# Patient Record
Sex: MALE | URBAN - METROPOLITAN AREA
[De-identification: names, ages, dates, MRNs, and addresses within clinical notes are randomized per-mention and may not be internally consistent; named-entity substitution may affect disease eponyms.]

---

## 2017-06-07 ENCOUNTER — MEDICAL CORRESPONDENCE (OUTPATIENT)
Dept: TRANSPLANT | Facility: CLINIC | Age: 52
End: 2017-06-07

## 2017-11-27 ENCOUNTER — OFFICE VISIT (OUTPATIENT)
Dept: TRANSPLANT | Facility: CLINIC | Age: 52
End: 2017-11-27
Attending: INTERNAL MEDICINE
Payer: COMMERCIAL

## 2017-11-27 ENCOUNTER — MEDICAL CORRESPONDENCE (OUTPATIENT)
Dept: TRANSPLANT | Facility: CLINIC | Age: 52
End: 2017-11-27

## 2017-11-27 VITALS
TEMPERATURE: 98 F | DIASTOLIC BLOOD PRESSURE: 79 MMHG | HEART RATE: 108 BPM | WEIGHT: 184.7 LBS | BODY MASS INDEX: 28.32 KG/M2 | RESPIRATION RATE: 16 BRPM | OXYGEN SATURATION: 95 % | SYSTOLIC BLOOD PRESSURE: 120 MMHG

## 2017-11-27 DIAGNOSIS — C90.00 MULTIPLE MYELOMA NOT HAVING ACHIEVED REMISSION (H): Primary | ICD-10-CM

## 2017-11-27 DIAGNOSIS — C90.02 MULTIPLE MYELOMA IN RELAPSE (H): Primary | ICD-10-CM

## 2017-11-27 DIAGNOSIS — Z71.9 VISIT FOR COUNSELING: Primary | ICD-10-CM

## 2017-11-27 PROCEDURE — 40000268 ZZH STATISTIC NO CHARGES: Mod: ZF

## 2017-11-27 PROCEDURE — 99213 OFFICE O/P EST LOW 20 MIN: CPT | Mod: ZF

## 2017-11-27 RX ORDER — ACETAMINOPHEN 500 MG
1000 TABLET ORAL
Status: ON HOLD | COMMUNITY
End: 2018-01-16

## 2017-11-27 RX ORDER — ONDANSETRON 4 MG/1
4 TABLET, ORALLY DISINTEGRATING ORAL
COMMUNITY
Start: 2016-01-25

## 2017-11-27 RX ORDER — DOXYCYCLINE 100 MG/1
100 CAPSULE ORAL
COMMUNITY
Start: 2017-12-05 | End: 2017-12-15

## 2017-11-27 RX ORDER — LOPERAMIDE HYDROCHLORIDE 2 MG/1
2 TABLET ORAL
Status: ON HOLD | COMMUNITY
End: 2018-01-16

## 2017-11-27 RX ORDER — HYDROMORPHONE HYDROCHLORIDE 2 MG/1
2 TABLET ORAL
Status: ON HOLD | COMMUNITY
Start: 2017-10-09 | End: 2018-01-16

## 2017-11-27 RX ORDER — PANTOPRAZOLE SODIUM 40 MG/1
40 TABLET, DELAYED RELEASE ORAL
Status: ON HOLD | COMMUNITY
Start: 2017-11-20 | End: 2018-01-16

## 2017-11-27 RX ORDER — DEXAMETHASONE 4 MG/1
TABLET ORAL
Status: ON HOLD | COMMUNITY
Start: 2017-09-27 | End: 2018-01-16

## 2017-11-27 RX ORDER — OXYCODONE HCL 10 MG/1
10 TABLET, FILM COATED, EXTENDED RELEASE ORAL EVERY MORNING
Status: ON HOLD | COMMUNITY
Start: 2017-10-30 | End: 2018-01-16

## 2017-11-27 RX ORDER — DILTIAZEM HYDROCHLORIDE 30 MG/1
60 TABLET, FILM COATED ORAL
COMMUNITY

## 2017-11-27 ASSESSMENT — PAIN SCALES - GENERAL: PAINLEVEL: NO PAIN (0)

## 2017-11-27 NOTE — NURSING NOTE
"Oncology Rooming Note    November 27, 2017 9:50 AM   Jeffrey Real is a 52 year old male who presents for:    Chief Complaint   Patient presents with     Consult     Repeat Consult- MM     Initial Vitals: /79  Pulse 108  Temp 98  F (36.7  C) (Oral)  Resp 16  Wt 83.8 kg (184 lb 11.2 oz)  SpO2 95%  BMI 28.32 kg/m2 Estimated body mass index is 28.32 kg/(m^2) as calculated from the following:    Height as of 12/3/15: 1.72 m (5' 7.72\").    Weight as of this encounter: 83.8 kg (184 lb 11.2 oz). Body surface area is 2 meters squared.  No Pain (0) Comment: Data Unavailable   No LMP for male patient.  Allergies reviewed: Yes  Medications reviewed: Yes    Medications: Medication refills not needed today.  Pharmacy name entered into EPIC: Data Unavailable    Clinical concerns: n/a     9 minutes for nursing intake (face to face time)     WILFREDO ARANA CMA              "

## 2017-11-27 NOTE — MR AVS SNAPSHOT
After Visit Summary   11/27/2017    Jeffrey Real    MRN: [id number]           Patient Information     Date Of Birth          1965        Visit Information        Provider Department      11/27/2017 11:30 AM Coordinator,  Bmt Nurse;  2 116 CONSULT [facility] Blood and Marrow Transplant        Today's Diagnoses     Multiple myeloma not having achieved remission (H)    -  1          [facility] ([facility]  [address] [place] [address]  Phone: [phone]  Clinic Hours:   Monday-Thursday:7am to 7pm   Friday: 7am to 5pm   Weekends and holidays:    8am to noon (in general)  If your fever is 100.5  or greater,   call the clinic.  After hours call the   hospital at [phone] or   [phone]. Ask for the BMT   fellow on-call            Follow-ups after your visit        Your next 10 appointments already scheduled     Nov 27, 2017 12:00 PM CST   (Arrive by 11:45 AM)   BMT [facility]  2 116 CONSULT [facility] Blood and Marrow Transplant ([facility] [place])    [address] [place] [id number] [phone]              Who to contact     If you have questions or need follow up information about today's clinic visit or your schedule please contact [facility] BLOOD AND MARROW TRANSPLANT directly at [phone].  Normal or non-critical lab and imaging results will be communicated to you by MyChart, letter or phone within 4 business days after the clinic has received the results. If you do not hear from us within 7 days, please contact the clinic through MyChart or phone. If you have a critical or abnormal lab result, we will notify you by phone as soon as possible.  Submit refill requests through GonnaBe or call your pharmacy and they will forward the refill request to us. Please allow 3 business days for your refill to be completed.          Additional Information About Your Visit         [employer] Information     [employer] gives you secure access to your electronic health record. If you see a primary care provider, you can also send messages to your care team and make appointments. If you have questions, please call your primary care clinic.  If you do not have a primary care provider, please call [phone] and they will assist you.        Care EveryWhere ID     This is your Care EveryWhere ID. This could be used by other organizations to access your [place] medical records  [id number]         Blood Pressure from Last 3 Encounters:   11/27/17 120/79   12/03/15 118/80    Weight from Last 3 Encounters:   11/27/17 83.8 kg (184 lb 11.2 oz)   12/03/15 97.3 kg (214 lb 8.1 oz)              Today, you had the following     No orders found for display       Recent Review Flowsheet Data     There is no flowsheet data to display.               Primary Care Provider Office Phone # Fax #    Hipolito Rollins  [phone]       [address] [address]        Equal Access to Services     NALINI MCKEON : Hadii aad ku hadasho Soomaali, waaxda luqadaha, qaybta kaalmada adeegyada, waxay bisi haykuldeep crawley . So [facility] [phone].    ATENCIÓN: Si habla español, tiene a guardado disposición servicios gratuitos de asistencia lingüística. Llame al [phone].    We comply with applicable federal civil rights laws and [place] laws. We do not discriminate on the basis of race, color, national origin, age, disability, sex, sexual orientation, or gender identity.            Thank you!     Thank you for choosing [facility] BLOOD AND MARROW TRANSPLANT  for your care. Our goal is always to provide you with excellent care. Hearing back from our patients is one way we can continue to improve our services. Please take a few minutes to complete the written survey that you may receive in the mail after your visit with us. Thank you!             Your Updated Medication List -  Protect others around you: Learn how to safely use, store and throw away your medicines at www.disposemymeds.org.          This list is accurate as of: 11/27/17 11:36 AM.  Always use your most recent med list.                   Brand Name Dispense Instructions for use Diagnosis    acetaminophen 500 MG tablet    TYLENOL     Take 1,000 mg by mouth        ATIVAN PO      Take by mouth At Bedtime        Calcium-Vitamin D3 600-400 MG-UNIT Caps           CITALOPRAM HYDROBROMIDE PO      Take by mouth daily        dexamethasone 4 MG tablet    DECADRON     Every Tuesday        diltiazem 30 MG tablet    CARDIZEM     Take 60 mg by mouth        doxycycline monohydrate 100 MG capsule   Start taking on:  12/5/2017      Take 100 mg by mouth        FAMCICLOVIR PO      Take by mouth daily        GABAPENTIN PO      Take by mouth 3 times daily        HYDROmorphone 2 MG tablet    DILAUDID     Take 2 mg by mouth        loperamide 2 MG tablet    IMODIUM A-D     Take 2 mg by mouth        METOPROLOL TARTRATE PO      Take by mouth daily        OMEPRAZOLE PO      Take by mouth every morning        ondansetron 4 MG ODT tab    ZOFRAN-ODT     Place 4 mg under the tongue        * OXYCODONE HCL PO           * oxyCODONE 10 MG 12 hr tablet    OxyCONTIN     Take 10 mg by mouth        pantoprazole 40 MG EC tablet    PROTONIX     Take 40 mg by mouth        pomalidomide 3 MG capsule    POMALYST     Take 3mg daily, days 1-21 every 28 days.        Quad Cane Misc      Wide Base Quad Cane for home use. For 6 weeks.        REVLIMID PO      Take by mouth daily Takes 21 days, then 7 days off.        TRAZODONE HCL PO      Take by mouth nightly as needed for sleep        * Notice:  This list has 2 medication(s) that are the same as other medications prescribed for you. Read the directions carefully, and ask your doctor or other care provider to review them with you.

## 2017-11-27 NOTE — PROGRESS NOTES
"BMT Clinical Social Work New Transplant Evaluation    Information for this evaluation on 2017 was collected via Pt and family report, consultation with medical team, and medical chart review.     Present:  Patient: Jeffrey \"David\"Farhan  Spouse: Shasta Real  Daughter: Saul Real  Clinical  (CSW): CLAY Jolly, [facility]    Medical Team:   Nurse Coordinator: Suly Calvo RN  BMT Physician: Murali Torre MD  Referring Physician: Stefania Coreas MD      Presenting Information:   Pt is a 52 year old male diagnosed with Multiple Myeloma who presents to [facility] for consultation regarding an allogeneic stem cell transplant. Pt was accompanied by his wife and daughter.      Contact Information:  Pt Phone: [phone]  Pt Email: [email or web address]  Pt's wife Manuel Phone: [phone]    Special Needs:  Pt will need local lodging.   The pt lives in [place], [place] and will need to relocate. Discussed options including the [facility] [place] and [employer] Apartments. The pt at this time would prefer to stay at the [employer] [address] [place].     Family Constellation:   Spouse: Shasta Real  Children: Saul Real, Rocío Real, and Karthikeyan Rice   Parents:   Siblings: 1 brother and 1 sister    Education/Employment:  The pt is unable to work at this time and is on Social Security Disability. Shasta works for [employer].    Finances/Insurance:  The pt is supported by his Social Security Disability. No financial or insurance concerns identified at this time.     CSW provided information regarding the insurance authorization process and the role of the BMT financial case-mangers. CSW provided contact info for the BMT financial case-managers and referred pt to them for future insurance questions.     Caregiver:  CSW discussed with Pt the caregiver role and expectation at length. Pt is agreeable to having a full time caregiver for a minimum of 100 days until cleared by the BMT " physician. Pt's identified caregivers are his wife and children. Caregiver education and information provided. No caregiver concerns identified.     Healthcare Directive:  No. CSW provided education and forms. CSW encouraged Pt to have discussions with their family regarding their health care wishes.     Resources Provided:  BMT Information Book   BMT Resources Packet:   Healthcare Directive:   Tour of Unit NO   Transplant unit description and information provided.     Identified Concerns:   No concerns identified at this time.     Summary:   Pt presents to [facility] for consultation regarding an allogeneic stem cell transplant. Pt/family asked good questions regarding psychosocial factors related to BMT; all of which were answered. Pt presented as friendly, but overwhelmed. Pt's affect was engaged. The pt's family was supportive and engaged in the conversation.       Plan:   CSW provided contact information and encouraged Pt to contact CSW with questions, concerns, resources and for support. CSW will continue to follow Pt to provide supportive counseling and assistance with resources as needed.      CLAY Jolly, [facility]  Pager [phone]  Phone [phone]

## 2017-11-27 NOTE — MR AVS SNAPSHOT
After Visit Summary   11/27/2017    Jeffrey Real    MRN: [id number]           Patient Information     Date Of Birth          1965        Visit Information        Provider Department      11/27/2017 12:00 PM Dulce Rivera [facility];  2 116 CONSULT [facility] Blood and Marrow Transplant        Today's Diagnoses     Visit for counseling    -  1          [facility] [place]  [address] [place] [address]  Phone: [phone]  Clinic Hours:   Monday-Thursday:7am to 7pm   Friday: 7am to 5pm   Weekends and holidays:    8am to noon (in general)  If your fever is 100.5  or greater,   call the clinic.  After hours call the   hospital at [phone] or   [phone]. Ask for the BMT   fellow on-call            Follow-ups after your visit        Who to contact     If you have questions or need follow up information about today's clinic visit or your schedule please contact [facility] BLOOD AND MARROW TRANSPLANT directly at [phone].  Normal or non-critical lab and imaging results will be communicated to you by Programmrhart, letter or phone within 4 business days after the clinic has received the results. If you do not hear from us within 7 days, please contact the clinic through M_SOLUTION or phone. If you have a critical or abnormal lab result, we will notify you by phone as soon as possible.  Submit refill requests through M_SOLUTION or call your pharmacy and they will forward the refill request to us. Please allow 3 business days for your refill to be completed.          Additional Information About Your Visit        MyChart Information     M[employer]UTION gives you secure access to your electronic health record. If you see a primary care provider, you can also send messages to your care team and make appointments. If you have questions, please call your primary care clinic.  If you do not have a primary care provider, please call [phone] and they will assist you.         Care EveryWhere ID     This is your Care EveryWhere ID. This could be used by other organizations to access your [facility] medical records  [id number]         Blood Pressure from Last 3 Encounters:   11/27/17 120/79   12/03/15 118/80    Weight from Last 3 Encounters:   11/27/17 83.8 kg (184 lb 11.2 oz)   12/03/15 97.3 kg (214 lb 8.1 oz)              Today, you had the following     No orders found for display       Recent Review Flowsheet Data     There is no flowsheet data to display.               Primary Care Provider Office Phone # Fax #    Hipolito Rollins [phone]       [address] [address]        Equal Access to Services     NALINI MCKEON : Hadii matthew santiagoo Socordeliaali, waaxda luqadaha, qaybta kaalmada adeegyada, adiel crawley . So [facility] [phone].    ATENCIÓN: Si habla español, tiene a guardado disposición servicios gratuitos de asistencia lingüística. [facility] [phone].    We comply with applicable federal civil rights laws and [place] laws. We do not discriminate on the basis of race, color, national origin, age, disability, sex, sexual orientation, or gender identity.            Thank you!     Thank you for choosing [facility] BLOOD AND MARROW TRANSPLANT  for your care. Our goal is always to provide you with excellent care. Hearing back from our patients is one way we can continue to improve our services. Please take a few minutes to complete the written survey that you may receive in the mail after your visit with us. Thank you!             Your Updated Medication List - Protect others around you: Learn how to safely use, store and throw away your medicines at www.disposemymeds.org.          This list is accurate as of: 11/27/17  1:53 PM.  Always use your most recent med list.                   Brand Name Dispense Instructions for use Diagnosis    acetaminophen 500 MG tablet    TYLENOL     Take 1,000 mg by mouth        ATIVAN  PO      Take by mouth At Bedtime        Calcium-Vitamin D3 600-400 MG-UNIT Caps           CITALOPRAM HYDROBROMIDE PO      Take by mouth daily        dexamethasone 4 MG tablet    DECADRON     Every Tuesday        diltiazem 30 MG tablet    CARDIZEM     Take 60 mg by mouth        doxycycline monohydrate 100 MG capsule   Start taking on:  12/5/2017      Take 100 mg by mouth        FAMCICLOVIR PO      Take by mouth daily        GABAPENTIN PO      Take by mouth 3 times daily        HYDROmorphone 2 MG tablet    DILAUDID     Take 2 mg by mouth        loperamide 2 MG tablet    IMODIUM A-D     Take 2 mg by mouth        METOPROLOL TARTRATE PO      Take by mouth daily        OMEPRAZOLE PO      Take by mouth every morning        ondansetron 4 MG ODT tab    ZOFRAN-ODT     Place 4 mg under the tongue        * OXYCODONE HCL PO           * oxyCODONE 10 MG 12 hr tablet    OxyCONTIN     Take 10 mg by mouth        pantoprazole 40 MG EC tablet    PROTONIX     Take 40 mg by mouth        pomalidomide 3 MG capsule    POMALYST     Take 3mg daily, days 1-21 every 28 days.        Quad Cane Misc      Wide Base Quad Cane for home use. For 6 weeks.        REVLIMID PO      Take by mouth daily Takes 21 days, then 7 days off.        TRAZODONE HCL PO      Take by mouth nightly as needed for sleep        * Notice:  This list has 2 medication(s) that are the same as other medications prescribed for you. Read the directions carefully, and ask your doctor or other care provider to review them with you.
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
## 2017-11-27 NOTE — PROGRESS NOTES
Met with Jeffrey, his wife, and daughter, following Dr. Torre's new evaluation visit in BMT clinic.  Discussed the role of the nurse coordinator and gave her Man [employer]well's business card. All questions were answered and patient verbalized understanding.

## 2017-11-27 NOTE — MR AVS SNAPSHOT
After Visit Summary   11/27/2017    Jeffrey Real    MRN: [id number]           Patient Information     Date Of Birth          1965        Visit Information        Provider Department      11/27/2017 10:00 AM  BMT DOM [facility] Marrow Transplant        Today's Diagnoses     Multiple myeloma in relapse (H)    -  1          [facility]  [address] [place] [address]  Phone: [phone]  Clinic Hours:   Monday-Thursday:7am to 7pm   Friday: 7am to 5pm   Weekends and holidays:    8am to noon (in general)  If your fever is 100.5  or greater,   call the clinic.  After hours call the   hospital at [phone] or   [phone]. Ask for the BMT   fellow on-call            Follow-ups after your visit        Follow-up notes from your care team     Return if symptoms worsen or fail to improve.      Who to contact     If you have questions or need follow up information about today's clinic visit or your schedule please contact [facility] BLOOD AND MARROW TRANSPLANT directly at [phone].  Normal or non-critical lab and imaging results will be communicated to you by ArtsApphart, letter or phone within 4 business days after the clinic has received the results. If you do not hear from us within 7 days, please contact the clinic through Handpayt or phone. If you have a critical or abnormal lab result, we will notify you by phone as soon as possible.  Submit refill requests through Mlog or call your pharmacy and they will forward the refill request to us. Please allow 3 business days for your refill to be completed.          Additional Information About Your Visit        ArtsApphart Information     Mlog gives you secure access to your electronic health record. If you see a primary care provider, you can also send messages to your care team and make appointments. If you have questions, please call your primary care clinic.  If you do not have a primary care provider,  please call [phone] and they will assist you.        Care EveryWhere ID     This is your Care EveryWhere ID. This could be used by other organizations to access your [facility] medical records  [id number]        Your Vitals Were     Pulse Temperature Respirations Pulse Oximetry BMI (Body Mass Index)       108 98  F (36.7  C) (Oral) 16 95% 28.32 kg/m2        Blood Pressure from Last 3 Encounters:   11/27/17 120/79   12/03/15 118/80    Weight from Last 3 Encounters:   11/27/17 83.8 kg (184 lb 11.2 oz)   12/03/15 97.3 kg (214 lb 8.1 oz)              Today, you had the following     No orders found for display       Recent Review Flowsheet Data     There is no flowsheet data to display.               Primary Care Provider Office Phone # Fax #    Hipolito Rollins [phone]       [address] [address]        Equal Access to Services     NALINI MCKEON : Hadii matthew ku hadasho Soomaali, waaxda luqadaha, qaybta kaalmada adeegyada, adiel gasparin audrey crawley . So [facility] [phone].    ATENCIÓN: Si habla español, tiene a guardado disposición servicios gratuitos de asistencia lingüística. Llame al [phone].    We comply with applicable federal civil rights laws and [place] laws. We do not discriminate on the basis of race, color, national origin, age, disability, sex, sexual orientation, or gender identity.            Thank you!     Thank you for choosing [facility] BLOOD AND MARROW TRANSPLANT  for your care. Our goal is always to provide you with excellent care. Hearing back from our patients is one way we can continue to improve our services. Please take a few minutes to complete the written survey that you may receive in the mail after your visit with us. Thank you!             Your Updated Medication List - Protect others around you: Learn how to safely use, store and throw away your medicines at www.disposemymeds.org.          This list is accurate as of: 11/27/17   4:10 PM.  Always use your most recent med list.                   Brand Name Dispense Instructions for use Diagnosis    acetaminophen 500 MG tablet    TYLENOL     Take 1,000 mg by mouth        ATIVAN PO      Take by mouth At Bedtime        Calcium-Vitamin D3 600-400 MG-UNIT Caps           CITALOPRAM HYDROBROMIDE PO      Take by mouth daily        dexamethasone 4 MG tablet    DECADRON     Every Tuesday        diltiazem 30 MG tablet    CARDIZEM     Take 60 mg by mouth        doxycycline monohydrate 100 MG capsule   Start taking on:  12/5/2017      Take 100 mg by mouth        FAMCICLOVIR PO      Take by mouth daily        GABAPENTIN PO      Take by mouth 3 times daily        HYDROmorphone 2 MG tablet    DILAUDID     Take 2 mg by mouth        loperamide 2 MG tablet    IMODIUM A-D     Take 2 mg by mouth        METOPROLOL TARTRATE PO      Take by mouth daily        OMEPRAZOLE PO      Take by mouth every morning        ondansetron 4 MG ODT tab    ZOFRAN-ODT     Place 4 mg under the tongue        * OXYCODONE HCL PO           * oxyCODONE 10 MG 12 hr tablet    OxyCONTIN     Take 10 mg by mouth        pantoprazole 40 MG EC tablet    PROTONIX     Take 40 mg by mouth        pomalidomide 3 MG capsule    POMALYST     Take 3mg daily, days 1-21 every 28 days.        Quad Cane Misc      Wide Base Quad Cane for home use. For 6 weeks.        REVLIMID PO      Take by mouth daily Takes 21 days, then 7 days off.        TRAZODONE HCL PO      Take by mouth nightly as needed for sleep        * Notice:  This list has 2 medication(s) that are the same as other medications prescribed for you. Read the directions carefully, and ask your doctor or other care provider to review them with you.

## 2018-01-01 ENCOUNTER — TRANSFERRED RECORDS (OUTPATIENT)
Dept: HEALTH INFORMATION MANAGEMENT | Facility: CLINIC | Age: 53
End: 2018-01-01

## 2018-01-02 ENCOUNTER — TELEPHONE (OUTPATIENT)
Dept: TRANSPLANT | Facility: CLINIC | Age: 53
End: 2018-01-02

## 2018-01-02 ENCOUNTER — HOSPITAL ENCOUNTER (INPATIENT)
Facility: CLINIC | Age: 53
LOS: 15 days | Discharge: HOME HEALTH | DRG: 871 | End: 2018-01-17
Attending: INTERNAL MEDICINE | Admitting: INTERNAL MEDICINE
Payer: COMMERCIAL

## 2018-01-02 ENCOUNTER — APPOINTMENT (OUTPATIENT)
Dept: CT IMAGING | Facility: CLINIC | Age: 53
DRG: 871 | End: 2018-01-02
Attending: NURSE PRACTITIONER
Payer: COMMERCIAL

## 2018-01-02 DIAGNOSIS — C90.02 MULTIPLE MYELOMA IN RELAPSE (H): ICD-10-CM

## 2018-01-02 PROBLEM — C90.00 MULTIPLE MYELOMA (H): Status: ACTIVE | Noted: 2018-01-02

## 2018-01-02 LAB
ALBUMIN SERPL-MCNC: 1.6 G/DL (ref 3.4–5)
ALBUMIN UR-MCNC: 30 MG/DL
ALP SERPL-CCNC: 131 U/L (ref 40–150)
ALT SERPL W P-5'-P-CCNC: 12 U/L (ref 0–70)
AMMONIA PLAS-SCNC: 70 UMOL/L (ref 10–50)
ANION GAP SERPL CALCULATED.3IONS-SCNC: 10 MMOL/L (ref 3–14)
ANISOCYTOSIS BLD QL SMEAR: SLIGHT
APPEARANCE UR: CLEAR
APTT PPP: 50 SEC (ref 22–37)
AST SERPL W P-5'-P-CCNC: 30 U/L (ref 0–45)
BACTERIA #/AREA URNS HPF: ABNORMAL /HPF
BASOPHILS # BLD AUTO: 0 10E9/L (ref 0–0.2)
BASOPHILS NFR BLD AUTO: 0 %
BILIRUB SERPL-MCNC: 0.6 MG/DL (ref 0.2–1.3)
BILIRUB UR QL STRIP: NEGATIVE
BUN SERPL-MCNC: 29 MG/DL (ref 7–30)
CA-I SERPL ISE-MCNC: 5.1 MG/DL (ref 4.4–5.2)
CALCIUM SERPL-MCNC: 10 MG/DL (ref 8.5–10.1)
CHLORIDE SERPL-SCNC: 110 MMOL/L (ref 94–109)
CO2 SERPL-SCNC: 18 MMOL/L (ref 20–32)
COLOR UR AUTO: ABNORMAL
CREAT SERPL-MCNC: 4.38 MG/DL (ref 0.66–1.25)
CREAT UR-MCNC: 22 MG/DL
DIFFERENTIAL METHOD BLD: ABNORMAL
DIFFERENTIAL METHOD BLD: NORMAL
EOSINOPHIL # BLD AUTO: 0 10E9/L (ref 0–0.7)
EOSINOPHIL NFR BLD AUTO: 1 %
ERYTHROCYTE [DISTWIDTH] IN BLOOD BY AUTOMATED COUNT: 18.4 % (ref 10–15)
ERYTHROCYTE [DISTWIDTH] IN BLOOD BY AUTOMATED COUNT: NORMAL % (ref 10–15)
FIBRINOGEN PPP-MCNC: 375 MG/DL (ref 200–420)
FRACT EXCRET NA UR+SERPL-RTO: 16.1 %
GFR SERPL CREATININE-BSD FRML MDRD: 14 ML/MIN/1.7M2
GLUCOSE SERPL-MCNC: 52 MG/DL (ref 70–99)
GLUCOSE UR STRIP-MCNC: NEGATIVE MG/DL
HCT VFR BLD AUTO: 26.5 % (ref 40–53)
HCT VFR BLD AUTO: NORMAL % (ref 40–53)
HGB BLD-MCNC: 8.1 G/DL (ref 13.3–17.7)
HGB BLD-MCNC: NORMAL G/DL (ref 13.3–17.7)
HGB UR QL STRIP: ABNORMAL
INR PPP: 1.38 (ref 0.86–1.14)
KETONES UR STRIP-MCNC: NEGATIVE MG/DL
LACTATE BLD-SCNC: 0.7 MMOL/L (ref 0.7–2)
LDH SERPL L TO P-CCNC: 464 U/L (ref 85–227)
LEUKOCYTE ESTERASE UR QL STRIP: NEGATIVE
LYMPHOCYTES # BLD AUTO: 0.1 10E9/L (ref 0.8–5.3)
LYMPHOCYTES NFR BLD AUTO: 7 %
MAGNESIUM SERPL-MCNC: 1.5 MG/DL (ref 1.6–2.3)
MCH RBC QN AUTO: 29 PG (ref 26.5–33)
MCH RBC QN AUTO: NORMAL PG (ref 26.5–33)
MCHC RBC AUTO-ENTMCNC: 30.6 G/DL (ref 31.5–36.5)
MCHC RBC AUTO-ENTMCNC: NORMAL G/DL (ref 31.5–36.5)
MCV RBC AUTO: 95 FL (ref 78–100)
MCV RBC AUTO: NORMAL FL (ref 78–100)
METAMYELOCYTES # BLD: 0 10E9/L
METAMYELOCYTES NFR BLD MANUAL: 3 %
MONOCYTES # BLD AUTO: 0.2 10E9/L (ref 0–1.3)
MONOCYTES NFR BLD AUTO: 11 %
MUCOUS THREADS #/AREA URNS LPF: PRESENT /LPF
MYELOCYTES # BLD: 0.1 10E9/L
MYELOCYTES NFR BLD MANUAL: 5 %
NEUTROPHILS # BLD AUTO: 1.2 10E9/L (ref 1.6–8.3)
NEUTROPHILS NFR BLD AUTO: 73 %
NITRATE UR QL: NEGATIVE
PH UR STRIP: 6 PH (ref 5–7)
PHOSPHATE SERPL-MCNC: 7 MG/DL (ref 2.5–4.5)
PLATELET # BLD AUTO: 35 10E9/L (ref 150–450)
PLATELET # BLD AUTO: NORMAL 10E9/L (ref 150–450)
POTASSIUM SERPL-SCNC: 5 MMOL/L (ref 3.4–5.3)
PROCALCITONIN SERPL-MCNC: 0.8 NG/ML
PROT SERPL-MCNC: 10.7 G/DL (ref 6.8–8.8)
RBC # BLD AUTO: 2.79 10E12/L (ref 4.4–5.9)
RBC # BLD AUTO: NORMAL 10E12/L (ref 4.4–5.9)
RBC #/AREA URNS AUTO: <1 /HPF (ref 0–2)
SODIUM SERPL-SCNC: 138 MMOL/L (ref 133–144)
SODIUM UR-SCNC: 109 MMOL/L
SODIUM UR-SCNC: 109 MMOL/L
SOURCE: ABNORMAL
SP GR UR STRIP: 1.01 (ref 1–1.03)
URATE SERPL-MCNC: 7.1 MG/DL (ref 3.5–7.2)
UROBILINOGEN UR STRIP-MCNC: NORMAL MG/DL (ref 0–2)
WBC # BLD AUTO: 1.6 10E9/L (ref 4–11)
WBC # BLD AUTO: NORMAL 10E9/L (ref 4–11)
WBC #/AREA URNS AUTO: 1 /HPF (ref 0–2)

## 2018-01-02 PROCEDURE — 84550 ASSAY OF BLOOD/URIC ACID: CPT | Performed by: NURSE PRACTITIONER

## 2018-01-02 PROCEDURE — 83605 ASSAY OF LACTIC ACID: CPT | Performed by: NURSE PRACTITIONER

## 2018-01-02 PROCEDURE — 80053 COMPREHEN METABOLIC PANEL: CPT | Performed by: NURSE PRACTITIONER

## 2018-01-02 PROCEDURE — 85730 THROMBOPLASTIN TIME PARTIAL: CPT | Performed by: NURSE PRACTITIONER

## 2018-01-02 PROCEDURE — 83615 LACTATE (LD) (LDH) ENZYME: CPT | Performed by: NURSE PRACTITIONER

## 2018-01-02 PROCEDURE — 36592 COLLECT BLOOD FROM PICC: CPT | Performed by: NURSE PRACTITIONER

## 2018-01-02 PROCEDURE — 82330 ASSAY OF CALCIUM: CPT | Performed by: NURSE PRACTITIONER

## 2018-01-02 PROCEDURE — 12000008 ZZH R&B INTERMEDIATE UMMC

## 2018-01-02 PROCEDURE — 82570 ASSAY OF URINE CREATININE: CPT | Performed by: NURSE PRACTITIONER

## 2018-01-02 PROCEDURE — 82140 ASSAY OF AMMONIA: CPT | Performed by: NURSE PRACTITIONER

## 2018-01-02 PROCEDURE — 85025 COMPLETE CBC W/AUTO DIFF WBC: CPT | Performed by: INTERNAL MEDICINE

## 2018-01-02 PROCEDURE — 85610 PROTHROMBIN TIME: CPT | Performed by: NURSE PRACTITIONER

## 2018-01-02 PROCEDURE — 85384 FIBRINOGEN ACTIVITY: CPT | Performed by: NURSE PRACTITIONER

## 2018-01-02 PROCEDURE — 70450 CT HEAD/BRAIN W/O DYE: CPT

## 2018-01-02 PROCEDURE — 87040 BLOOD CULTURE FOR BACTERIA: CPT | Performed by: NURSE PRACTITIONER

## 2018-01-02 PROCEDURE — 99223 1ST HOSP IP/OBS HIGH 75: CPT | Mod: AI | Performed by: INTERNAL MEDICINE

## 2018-01-02 PROCEDURE — 81001 URINALYSIS AUTO W/SCOPE: CPT | Performed by: NURSE PRACTITIONER

## 2018-01-02 PROCEDURE — 84300 ASSAY OF URINE SODIUM: CPT

## 2018-01-02 PROCEDURE — 87086 URINE CULTURE/COLONY COUNT: CPT | Performed by: NURSE PRACTITIONER

## 2018-01-02 PROCEDURE — 84100 ASSAY OF PHOSPHORUS: CPT | Performed by: NURSE PRACTITIONER

## 2018-01-02 PROCEDURE — 25000128 H RX IP 250 OP 636: Performed by: NURSE PRACTITIONER

## 2018-01-02 PROCEDURE — 84300 ASSAY OF URINE SODIUM: CPT | Performed by: NURSE PRACTITIONER

## 2018-01-02 PROCEDURE — 83735 ASSAY OF MAGNESIUM: CPT | Performed by: NURSE PRACTITIONER

## 2018-01-02 PROCEDURE — 84145 PROCALCITONIN (PCT): CPT | Performed by: NURSE PRACTITIONER

## 2018-01-02 RX ORDER — SODIUM CHLORIDE 9 MG/ML
INJECTION, SOLUTION INTRAVENOUS CONTINUOUS
Status: DISCONTINUED | OUTPATIENT
Start: 2018-01-02 | End: 2018-01-03

## 2018-01-02 RX ORDER — ONDANSETRON 8 MG/1
8 TABLET, ORALLY DISINTEGRATING ORAL EVERY 8 HOURS PRN
Status: DISCONTINUED | OUTPATIENT
Start: 2018-01-02 | End: 2018-01-09

## 2018-01-02 RX ORDER — OXYCODONE HCL 20 MG/1
20 TABLET, FILM COATED, EXTENDED RELEASE ORAL EVERY EVENING
Status: ON HOLD | COMMUNITY
End: 2018-01-16

## 2018-01-02 RX ORDER — ONDANSETRON 4 MG/1
4-8 TABLET, ORALLY DISINTEGRATING ORAL EVERY 6 HOURS PRN
Status: DISCONTINUED | OUTPATIENT
Start: 2018-01-02 | End: 2018-01-02

## 2018-01-02 RX ORDER — ACETAMINOPHEN 325 MG/1
650 TABLET ORAL EVERY 4 HOURS PRN
Status: DISCONTINUED | OUTPATIENT
Start: 2018-01-02 | End: 2018-01-16

## 2018-01-02 RX ORDER — LORAZEPAM 0.5 MG/1
.5-1 TABLET ORAL EVERY 4 HOURS PRN
Status: DISCONTINUED | OUTPATIENT
Start: 2018-01-02 | End: 2018-01-02

## 2018-01-02 RX ORDER — PANTOPRAZOLE SODIUM 40 MG/1
40 TABLET, DELAYED RELEASE ORAL
Status: DISCONTINUED | OUTPATIENT
Start: 2018-01-02 | End: 2018-01-07

## 2018-01-02 RX ORDER — HYDROMORPHONE HYDROCHLORIDE 2 MG/1
2 TABLET ORAL
Status: DISCONTINUED | OUTPATIENT
Start: 2018-01-02 | End: 2018-01-02

## 2018-01-02 RX ORDER — GABAPENTIN 300 MG/1
300 CAPSULE ORAL DAILY
Status: DISCONTINUED | OUTPATIENT
Start: 2018-01-03 | End: 2018-01-02

## 2018-01-02 RX ORDER — CITALOPRAM HYDROBROMIDE 20 MG/1
40 TABLET ORAL DAILY
Status: DISCONTINUED | OUTPATIENT
Start: 2018-01-03 | End: 2018-01-17 | Stop reason: HOSPADM

## 2018-01-02 RX ORDER — ONDANSETRON 2 MG/ML
8 INJECTION INTRAMUSCULAR; INTRAVENOUS EVERY 8 HOURS PRN
Status: DISCONTINUED | OUTPATIENT
Start: 2018-01-02 | End: 2018-01-09

## 2018-01-02 RX ORDER — ONDANSETRON 8 MG/1
8 TABLET, FILM COATED ORAL EVERY 8 HOURS PRN
Status: DISCONTINUED | OUTPATIENT
Start: 2018-01-02 | End: 2018-01-02

## 2018-01-02 RX ORDER — NALOXONE HYDROCHLORIDE 0.4 MG/ML
.1-.4 INJECTION, SOLUTION INTRAMUSCULAR; INTRAVENOUS; SUBCUTANEOUS
Status: DISCONTINUED | OUTPATIENT
Start: 2018-01-02 | End: 2018-01-17 | Stop reason: HOSPADM

## 2018-01-02 RX ORDER — PROCHLORPERAZINE MALEATE 5 MG
5-10 TABLET ORAL EVERY 6 HOURS PRN
Status: DISCONTINUED | OUTPATIENT
Start: 2018-01-02 | End: 2018-01-17 | Stop reason: HOSPADM

## 2018-01-02 RX ORDER — OXYCODONE HCL 10 MG/1
10 TABLET, FILM COATED, EXTENDED RELEASE ORAL EVERY MORNING
Status: DISCONTINUED | OUTPATIENT
Start: 2018-01-03 | End: 2018-01-02

## 2018-01-02 RX ADMIN — SODIUM CHLORIDE: 9 INJECTION, SOLUTION INTRAVENOUS at 16:04

## 2018-01-02 RX ADMIN — PIPERACILLIN SODIUM AND TAZOBACTAM SODIUM 2.25 G: 36; 4.5 INJECTION, POWDER, FOR SOLUTION INTRAVENOUS at 18:55

## 2018-01-02 RX ADMIN — SODIUM CHLORIDE: 9 INJECTION, SOLUTION INTRAVENOUS at 22:31

## 2018-01-02 NOTE — TELEPHONE ENCOUNTER
Called by Dr Izaguirre at [facility]. David is hospitalized with renal failur and infection.Will transfer to [facility], Dr Levy arranging the transfer. Need to decide if any other therapies available. Not a transplant candidate at this time  Murali sow

## 2018-01-02 NOTE — LETTER
Transition Communication Hand-off for Care Transitions to Next Level of Care Provider    Name: Jeffrey Real  MRN #: [id number]  Primary Care Provider: Hipolito Rollins  Primary Clinic: [facility] [address]  [facility] [address]    Hematologist: Dr. Torre/[facility]     Reason for Hospitalization:  Acute renal injury  Multiple myeloma relapse  Multiple myeloma (H)  Admit Date/Time: 1/2/2018  3:17 PM  Discharge Date: 1/17/2018  Payor Source: Payor: MEDICA / Plan: MEDICA CHOICE / Product Type: Indemnity /     Copied from 1/16 provider note:  Jeffrey Real is a 53 y/o M with complex PMH including refractory IgA Multiple Myeloma s/p autologous transplant 08/2014, most recently on Daralex/Pom/Decadron (started 11/17), transferred from OSH d/t CHRISTIANE on CKD, persistent sinusitis, HCAP, concern for sepsis, & altered mental status.       # Delirium vs encephalopathy. Felt to be r/t CHRISTIANE and ongoing prolonged hospitalizations. Also consider acyclovir was given for PPX (VZV r/t chemo) while Cr was high.  - Head CT w/o contrast shows no evidence of acute intracranial pathology but + sinusitis & mild chronic small vessel ischemic disease.  - Held PTA oxycodone, dilaudid, ativan & gabapentin d/t potential sedating effects/CHRISTIANE, no withdrawal symptoms. Given reports of pain, added back oxycodone 5-10mg every 4h as needed.  - Given continued improvement, OK to d/c video patient monitoring.  - D/c'd PRN Ativan. QTc 456, using Haldol cautiously (0.5-1mg every 6 hours PRN).   - SLUMs score 20/30 per OT (abnormal).  - Neurology consulted, appreciate their input and recommendations. Video EEG obtained to r/o subclinical seizures. Difficult study given agitation, but indicates mild-to-moderate encephalopathy with no evidence of seizures seen. Last note 1/10 says (neurology will follow peripherally with further rec's to follow if patient should worsen while improving renal function).  - Hyperammonemic on admission, with  normal liver function, so not routinely trended thereafter (70-->92-->95). Level re-checked on 1/11, slightly elevated at 65.  - Surveillance blood cultures & urine culture drawn (1/10), repeated again today.   - Unable to obtain brain MRI secondary to CHRISTIANE.  - Continue to hold acyclovir in the event it is exacerbating/contributing to AMS now on Valtrex.   - Please don't give oxycodone during the day, if you haven't already tried tylenol, heat (aqua K), cold (ice pack), biofreeze, lidocaine patch, position changes (up to chair). Patient very sensitive to medications.      # High Risk/Resistant Multiple Myeloma IgA kappa. S/P autologous peripheral blood stem cell transplant in 08/2014.   # Multiple lytic lesions 2/2 MM (ribs, T7, T9, T11, T12) s/p XRT to thoracolumbar spine.  # Fracture L ischium.  # Hypogammaglobulinemia s/p IVIG replacement.  S/P multiple lines of therapy including auto tx (RVD, auto+melphalan, Velcade Maintenance, VDT-PACE, followed by PCD) complex cytogenetics (17 p deletion, p53 deletion) most recently on pomalidomide/carfilzomib & dex 20+ cycles (started 02/2017) with progression in November 2017. Most recently switched to daratumumab/pomalidomide/dex.  - Plan was to try to get M-spike < 1 gram% then pursue Allogeneic transplant (saw Dr. Torre in clinic 11/27/17). Has brother as haplo-identical donor.  - 12/26/17 found to have pathologic fractures of T2 spinous process & bilateral T1 transverse process. Destructive lytic lesion involving left scapula (partially imaged).   - S/p dex and daratumumab on 1/6/18, holding Pomalyst. Per wife, this was his 3rd dose of daratumumab since initiating in November 2017. Myeloma labs from this admission compared to prior labs from Nov 2017 at OSH, ? Slightly worse.  - Dex and daratumumab per protocol given on 1/13.  - Labs & CRIS later this week, Daratumumab next Monday, then Yelitza SANDY 1/30.     Discharge Plan:  Home with 24 hour assist provided by  family. Patient and wife declined TCU.  - Referral made to [facility] (RN, PT and OT)     Discharge Needs Assessment:  Needs       Most Recent Value    Equipment Currently Used at Home cane, straight    Transportation Available family or friend will provide        Follow-up plan:  Future Appointments  Date Time Provider Department Center   1/18/2018 12:00 PM  [facility] DRAW [facility]   1/18/2018 12:30 PM Russel Bliss PA [facility]   1/22/2018 9:15 AM  [facility] DRAW [facility]   1/22/2018 10:00 AM Russel Bliss PA [facility]   1/22/2018 11:30 AM [facility] ONCOLOGY INFUSION [facility]   1/30/2018 2:00 PM  [facility] DRAW [facility]   1/30/2018 2:30 PM James Torre MD [facility]       Any outstanding tests or procedures:        Referrals     Future Labs/Procedures    Home care nursing referral     Comments:    [facility]  Phone [phone]  Fax [phone]    RN skilled nursing visit. RN to assess vital signs and weight, respiratory and cardiac status, pain level and activity tolerance, hydration, nutrition and bowel status and home safety.  RN to teach medication management.  Your provider has ordered home care nursing services. If you have not been contacted within 2 days of your discharge please call the inpatient department phone number at [phone] .    Home Care OT Referral for Hospital Discharge     Comments:    [facility]  Phone [phone]  Fax [phone]    OT to eval and treat    Your provider has ordered home care - occupational therapy. If you have not been contacted within 2 days of your discharge please call the department phone number listed on the top of this document.    Home Care PT Referral for Hospital Discharge     Comments:    [facility]  Phone [phone]  Fax [phone]    PT to eval and treat    Your provider has ordered home care - physical therapy. If you have not been contacted within 2 days of  your discharge please call the department phone number listed on the top of this document.            ROHITH Gee  Phone [phone]  Pager [phone]    AVS/Discharge Summary is the source of truth; this is a helpful guide for improved communication of patient story

## 2018-01-02 NOTE — PLAN OF CARE
Problem: Patient Care Overview  Goal: Plan of Care/Patient Progress Review  Outcome: No Change    Nursing Focus: Admission  D: Arrived at 7D from [facility] via EMS stretcher. Patient accompanied by wife, Shasta. Admitted for generalized weakness and acute kidney injury.       I: Admission process began.  Patient oriented to room, enviroment, call light.  Md. notified of patients arrival on unit.     A: Vital signs stable, afebrile.  Patient stable at this time.  Pt is very lethargic and disoriented to date and place.  Arouses to voice but easily distracted. Nephrology consult placed. CT of the head completed. Straight cathed for urine samples.  Lab called and reported initial sample agglutinized and this may falsely lower the hgb. Re run was hgb 8.1, pt no longer meeting parameters for PRBC.    P: Implement plan of care when available. Continue to monitor patient. Nursing interventions as appropriate. Notify md with changes in pt status.
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
## 2018-01-02 NOTE — IP AVS SNAPSHOT
[address] [address]    Phone:  [phone]                                       After Visit Summary   1/2/2018    Jeffrey Real    MRN: [id number]           After Visit Summary Signature Page     I have received my discharge instructions, and my questions have been answered. I have discussed any challenges I see with this plan with the nurse or doctor.    ..........................................................................................................................................  Patient/Patient Representative Signature      ..........................................................................................................................................  Patient Representative Print Name and Relationship to Patient    ..................................................               ................................................  Date                                            Time    ..........................................................................................................................................  Reviewed by Signature/Title    ...................................................              ..............................................  Date                                                            Time

## 2018-01-02 NOTE — IP AVS SNAPSHOT
MRN:[id number]                      After Visit Summary   1/2/2018    Jeffrey Real    MRN: [id number]           Thank you!     Thank you for choosing [facility] for your care. Our goal is always to provide you with excellent care. Hearing back from our patients is one way we can continue to improve our services. Please take a few minutes to complete the written survey that you may receive in the mail after you visit with us. Thank you!        Patient Information     Date Of Birth          1965        Designated Caregiver       Most Recent Value    Caregiver    Will someone help with your care after discharge? yes    Name of designated caregiver wifezackery    Phone number of caregiver see contact info    Caregiver address see contact info      About your hospital stay     You were admitted on:  January 2, 2018 You last received care in the:  Unit [address] [facility] [place]    You were discharged on:  January 17, 2018        Reason for your hospital stay       Admitted 2/2 to relapsed MM, CHRISTIANE, encephalopathy.                  Who to Call     For medical emergencies, please call 911.  For non-urgent questions about your medical care, please call your primary care provider or clinic, [phone]          Attending Provider     Provider Specialty    Luis Fernando Levy MD Internal Medicine - Hematology    [facility]    [facility], Tony Cox MD Hematology       Primary Care Provider Office Phone # Fax #    Hipolito Rollins  [phone]       When to contact your care team       Please call the [facility] Triage RN Line [phone] ([facility] RN available M-F 8-5, after 5 pm the RN Advisor will page the On-Call Oncology Fellow who will return your call) for temperature greater than 100.4 F, uncontrolled nausea/vomiting/diarrhea or unrelieved constipation, pain not relieved by medications, bleeding not stopped by pressure, dizziness, chest pain, shortness of breath, changes  in level of consciousness, or any other new concerning symptoms.                  After Care Instructions     Activity       Your activity upon discharge: as tolerated, please continue outpatient PT to regain strength            Diet       Follow this diet upon discharge: regular                  Follow-up Appointments     Adult [facility] Follow-up and recommended labs and tests       - Please f/u with CRIS tomorrow for labs.   - Please f/u for Daratumumab next week.  - Please f/u with Yelitza on 1/30.  - RN CC to re-enroll in home care.  - RN CC to re-enroll it outpatient PT/OT.    Appointments on [facility] and/or [facility] (with [facility] provider or service). Call [phone] if you haven't heard regarding these appointments within 7 days of discharge.                  Your next 10 appointments already scheduled     Jan 18, 2018 12:00 PM CST   [facility] with  [facility]   [facility]    [address] [place] [address]  [address]  [facility] [address]   [phone]            Jan 18, 2018 12:30 PM CST   (Arrive by 12:15 PM)   Return Visit with HERMILO Burnham   [facility] ([facility])    [address] [place] [address]  [address]  [facility] [address]   [phone]            Jan 22, 2018  9:15 AM CST   [facility] Lab Draw with  [facility] LAB DRAW   [facility] Mason[facility] Lab Draw ([facility])    [address] [place] [address]  [address]  [facility] [address]   [phone]            Jan 22, 2018 10:00 AM CST   (Arrive by 9:45 AM)   Return Visit with HERMILO Burnham   [facility] ([facility])    [address] [place] [address]  [address]  [facility] [address]   [phone]            Jan 22, 2018 11:30 AM CST   Infusion 360 with [facility],  17 ATC   [facility] ([facility])    [address]  [facility]  [address]  [facility] [address]   [phone]            Jan 30, 2018  2:00 PM CST   [facility] Lab Draw with  M[facility] LAB DRAW   [facility] Mason[facility] Lab Draw ([facility])    [address]  [address]  [facility] [address]   [phone]            Jan 30, 2018  2:30 PM CST   RETURN ONC with James Torre MD   [facility] Blood and Marrow Transplant ([facility])    [address]  [address]  [facility] [address] [phone]              Additional Services     Home Care OT Referral for Hospital Discharge       [facility]  Phone [phone]  Fax [phone]    OT to eval and treat    Your provider has ordered home care - occupational therapy. If you have not been contacted within 2 days of your discharge please call the department phone number listed on the top of this document.            Home Care PT Referral for Hospital Discharge       [facility]  Phone [phone]  Fax [phone]    PT to eval and treat    Your provider has ordered home care - physical therapy. If you have not been contacted within 2 days of your discharge please call the department phone number listed on the top of this document.            Home care nursing referral       [facility]  Phone [phone]  Fax [phone]    RN skilled nursing visit. RN to assess vital signs and weight, respiratory and cardiac status, pain level and activity tolerance, hydration, nutrition and bowel status and home safety.  RN to teach medication management.  Your provider has ordered home care nursing services. If you have not been contacted within 2 days of your discharge please call the inpatient department phone number at [phone] .                  Future tests that were ordered for you     CBC with platelets differential       Last Lab Result: Hemoglobin (g/dL)       Date                     Value                  "01/16/2018               9.5 (L)          ----------            Comprehensive metabolic panel                 Pending Results     Date and Time Order Name Status Description    1/16/2018 1358 Blood culture Preliminary     1/3/2018 1254 Fungus culture Preliminary             Statement of Approval     Ordered          01/16/18 1209  I have reviewed and agree with all the recommendations and orders detailed in this document.  EFFECTIVE NOW     Approved and electronically signed by:  Cynthia Kyle APRN CNP             Admission Information     Date & Time Provider Department Dept. Phone    1/2/2018 Tony Guevara MD Unit 7D [facility] [place] [phone]      Your Vitals Were     Blood Pressure Pulse Temperature Respirations Height Weight    117/77 96 96.8  F (36  C) (Oral) 18 1.778 m (5' 10\") 72 kg (158 lb 12.8 oz)    Pulse Oximetry BMI (Body Mass Index)                97% 22.79 kg/m2          MyChart Information     [facility] gives you secure access to your electronic health record. If you see a primary care provider, you can also send messages to your care team and make appointments. If you have questions, please call your primary care clinic.  If you do not have a primary care provider, please call [phone] and they will assist you.        Care EveryWhere ID     This is your Care EveryWhere ID. This could be used by other organizations to access your [facility] medical records  [id number]        Equal Access to Services     NALINI MCKEON : Hadii matthew Mcgee, vilma benton, qaybta adiel cowan. So [facility] [phone].    ATENCIÓN: Si habla español, tiene a guardado disposición servicios gratuitos de asistencia lingüística. Lljustin al [phone].    We comply with applicable federal civil rights laws and [place] laws. We do not discriminate on the basis of race, color, national origin, age, disability, sex, sexual orientation, or gender " identity.               Review of your medicines      START taking        Dose / Directions    amLODIPine 5 MG tablet   Commonly known as:  NORVASC   Used for:  Multiple myeloma in relapse (H)        Dose:  5 mg   Take 1 tablet (5 mg) by mouth daily   Quantity:  30 tablet   Refills:  0       Lidocaine 4 % Patch   Commonly known as:  LIDOCARE   Used for:  Multiple myeloma in relapse (H)        Dose:  2 patch   Place 2 patches onto the skin every 24 hours   Quantity:  30 patch   Refills:  0       melatonin 3 MG tablet   Used for:  Multiple myeloma in relapse (H)        Dose:  3 mg   Take 1 tablet (3 mg) by mouth At Bedtime   Quantity:  10 tablet   Refills:  0       traMADol 50 MG tablet   Commonly known as:  ULTRAM   Used for:  Multiple myeloma in relapse (H)        Dose:  50 mg   Take 1 tablet (50 mg) by mouth every 6 hours as needed for moderate pain   Quantity:  20 tablet   Refills:  0       valACYclovir 1000 mg tablet   Commonly known as:  VALTREX   Indication:  vzv ppx   Used for:  Multiple myeloma in relapse (H)        Dose:  1000 mg   Take 1 tablet (1,000 mg) by mouth daily   Quantity:  14 tablet   Refills:  1         CONTINUE these medicines which may have CHANGED, or have new prescriptions. If we are uncertain of the size of tablets/capsules you have at home, strength may be listed as something that might have changed.        Dose / Directions    acetaminophen 325 MG tablet   Commonly known as:  TYLENOL   This may have changed:    - medication strength  - how much to take  - when to take this  - reasons to take this   Used for:  Multiple myeloma in relapse (H)        Dose:  650 mg   Take 2 tablets (650 mg) by mouth every 4 hours as needed for mild pain   Quantity:  100 tablet   Refills:  0       citalopram 40 MG tablet   Commonly known as:  celeXA   This may have changed:    - medication strength  - how much to take   Used for:  Multiple myeloma in relapse (H)        Dose:  40 mg   Take 1 tablet (40 mg) by  mouth daily   Quantity:  60 tablet   Refills:  0       metoprolol tartrate 25 MG tablet   Commonly known as:  LOPRESSOR   This may have changed:    - how much to take  - when to take this   Used for:  Multiple myeloma in relapse (H)        Dose:  25 mg   Take 1 tablet (25 mg) by mouth 2 times daily   Quantity:  20 tablet   Refills:  0         CONTINUE these medicines which have NOT CHANGED        Dose / Directions    ATIVAN PO        Take by mouth At Bedtime   Refills:  0       diltiazem 30 MG tablet   Commonly known as:  CARDIZEM        Dose:  60 mg   Take 60 mg by mouth   Refills:  0       OMEPRAZOLE PO        Take by mouth every morning   Refills:  0       ondansetron 4 MG ODT tab   Commonly known as:  ZOFRAN-ODT        Dose:  4 mg   Place 4 mg under the tongue   Refills:  0       Quad Cane Misc        Wide Base Quad Cane for home use. For 6 weeks.   Refills:  0         STOP taking     ACYCLOVIR PO           Calcium-Vitamin D3 600-400 MG-UNIT Caps           dexamethasone 4 MG tablet   Commonly known as:  DECADRON           FAMCICLOVIR PO           GABAPENTIN PO           HYDROmorphone 2 MG tablet   Commonly known as:  DILAUDID           loperamide 2 MG tablet   Commonly known as:  IMODIUM A-D           oxyCODONE 10 MG 12 hr tablet   Commonly known as:  OxyCONTIN           OXYCODONE HCL PO           OXYCONTIN 20 MG 12 hr tablet   Generic drug:  oxyCODONE           pantoprazole 40 MG EC tablet   Commonly known as:  PROTONIX           pomalidomide 3 MG capsule   Commonly known as:  POMALYST           REVLIMID PO           TRAZODONE HCL PO                Where to get your medicines      These medications were sent to [facility] - [place], [place] - [address] [address]     Phone:  [phone]     amLODIPine 5 MG tablet    citalopram 40 MG tablet    Lidocaine 4 % Patch    melatonin 3 MG tablet    metoprolol tartrate 25 MG tablet    valACYclovir 1000 mg tablet          Some of these will need a paper prescription and others can be bought over the counter. Ask your nurse if you have questions.     Bring a paper prescription for each of these medications     traMADol 50 MG tablet       You don't need a prescription for these medications     acetaminophen 325 MG tablet               ANTIBIOTIC INSTRUCTION     You've Been Prescribed an Antibiotic - Now What?  Your healthcare team thinks that you or your loved one might have an infection. Some infections can be treated with antibiotics, which are powerful, life-saving drugs. Like all medications, antibiotics have side effects and should only be used when necessary. There are some important things you should know about your antibiotic treatment.      Your healthcare team may run tests before you start taking an antibiotic.    Your team may take samples (e.g., from your blood, urine or other areas) to run tests to look for bacteria. These test can be important to determine if you need an antibiotic at all and, if you do, which antibiotic will work best.      Within a few days, your healthcare team might change or even stop your antibiotic.    Your team may start you on an antibiotic while they are working to find out what is making you sick.    Your team might change your antibiotic because test results show that a different antibiotic would be better to treat your infection.    In some cases, once your team has more information, they learn that you do not need an antibiotic at all. They may find out that you don't have an infection, or that the antibiotic you're taking won't work against your infection. For example, an infection caused by a virus can't be treated with antibiotics. Staying on an antibiotic when you don't need it is more likely to be harmful than helpful.      You may experience side effects from your antibiotic.    Like all medications, antibiotics have side effects. Some of these can be serious.    Let you  healthcare team know if you have any known allergies when you are admitted to the hospital.    One significant side effect of nearly all antibiotics is the risk of severe and sometimes deadly diarrhea caused by Clostridium difficile (C. Difficile). This occurs when a person takes antibiotics because some good germs are destroyed. Antibiotic use allows C. diificile to take over, putting patients at high risk for this serious infection.    As a patient or caregiver, it is important to understand your or your loved one's antibiotic treatment. It is especially important for caregivers to speak up when patients can't speak for themselves. Here are some important questions to ask your healthcare team.    What infection is this antibiotic treating and how do you know I have that infection?    What side effects might occur from this antibiotic?    How long will I need to take this antibiotic?    Is it safe to take this antibiotic with other medications or supplements (e.g., vitamins) that I am taking?     Are there any special directions I need to know about taking this antibiotic? For example, should I take it with food?    How will I be monitored to know whether my infection is responding to the antibiotic?    What tests may help to make sure the right antibiotic is prescribed for me?      Information provided by:  www.cdc.gov/getsmart  U.S. Department of Health and Human Services  Centers for disease Control and Prevention  National Center for Emerging and Zoonotic Infectious Diseases  Division of Healthcare Quality Promotion         Protect others around you: Learn how to safely use, store and throw away your medicines at www.disposemymeds.org.             Medication List: This is a list of all your medications and when to take them. Check marks below indicate your daily home schedule. Keep this list as a reference.      Medications           Morning Afternoon Evening Bedtime As Needed    acetaminophen 325 MG tablet    Commonly known as:  TYLENOL   Take 2 tablets (650 mg) by mouth every 4 hours as needed for mild pain   Last time this was given:  650 mg on 1/17/2018  5:30 AM                                   amLODIPine 5 MG tablet   Commonly known as:  NORVASC   Take 1 tablet (5 mg) by mouth daily   Last time this was given:  5 mg on 1/17/2018  8:47 AM                                   ATIVAN PO   Take by mouth At Bedtime                                      citalopram 40 MG tablet   Commonly known as:  celeXA   Take 1 tablet (40 mg) by mouth daily   Last time this was given:  40 mg on 1/17/2018  8:46 AM                                   diltiazem 30 MG tablet   Commonly known as:  CARDIZEM   Take 60 mg by mouth   Last time this was given:  60 mg on 1/17/2018  8:46 AM                                   Lidocaine 4 % Patch   Commonly known as:  LIDOCARE   Place 2 patches onto the skin every 24 hours   Last time this was given:  2 patches on 1/16/2018  7:45 PM                    On for 12 hours, off for 12 hours               melatonin 3 MG tablet   Take 1 tablet (3 mg) by mouth At Bedtime   Last time this was given:  3 mg on 1/17/2018 12:24 AM                                   metoprolol tartrate 25 MG tablet   Commonly known as:  LOPRESSOR   Take 1 tablet (25 mg) by mouth 2 times daily   Last time this was given:  25 mg on 1/17/2018  8:47 AM                                      OMEPRAZOLE PO   Take by mouth every morning                                   ondansetron 4 MG ODT tab   Commonly known as:  ZOFRAN-ODT   Place 4 mg under the tongue   Last time this was given:  8 mg on 1/7/2018  5:50 PM                                   Quad Cane Misc   Wide Base Quad Cane for home use. For 6 weeks.                                traMADol 50 MG tablet   Commonly known as:  ULTRAM   Take 1 tablet (50 mg) by mouth every 6 hours as needed for moderate pain                                   valACYclovir 1000 mg tablet   Commonly known as:   VALTREX   Take 1 tablet (1,000 mg) by mouth daily   Last time this was given:  1,000 mg on 1/17/2018  8:47 AM

## 2018-01-03 ENCOUNTER — APPOINTMENT (OUTPATIENT)
Dept: OCCUPATIONAL THERAPY | Facility: CLINIC | Age: 53
DRG: 871 | End: 2018-01-03
Attending: NURSE PRACTITIONER
Payer: COMMERCIAL

## 2018-01-03 ENCOUNTER — APPOINTMENT (OUTPATIENT)
Dept: ULTRASOUND IMAGING | Facility: CLINIC | Age: 53
DRG: 871 | End: 2018-01-03
Payer: COMMERCIAL

## 2018-01-03 ENCOUNTER — APPOINTMENT (OUTPATIENT)
Dept: PHYSICAL THERAPY | Facility: CLINIC | Age: 53
DRG: 871 | End: 2018-01-03
Attending: NURSE PRACTITIONER
Payer: COMMERCIAL

## 2018-01-03 LAB
ANION GAP SERPL CALCULATED.3IONS-SCNC: 13 MMOL/L (ref 3–14)
BACTERIA SPEC CULT: NO GROWTH
BASOPHILS # BLD AUTO: 0 10E9/L (ref 0–0.2)
BASOPHILS NFR BLD AUTO: 0.9 %
BLD PROD TYP BPU: NORMAL
BLD UNIT ID BPU: 0
BLOOD PRODUCT CODE: NORMAL
BPU ID: NORMAL
BUN SERPL-MCNC: 30 MG/DL (ref 7–30)
CALCIUM SERPL-MCNC: 9.8 MG/DL (ref 8.5–10.1)
CHLORIDE SERPL-SCNC: 113 MMOL/L (ref 94–109)
CO2 SERPL-SCNC: 15 MMOL/L (ref 20–32)
CREAT SERPL-MCNC: 4.73 MG/DL (ref 0.66–1.25)
DIFFERENTIAL METHOD BLD: ABNORMAL
EOSINOPHIL # BLD AUTO: 0.1 10E9/L (ref 0–0.7)
EOSINOPHIL NFR BLD AUTO: 2.2 %
ERYTHROCYTE [DISTWIDTH] IN BLOOD BY AUTOMATED COUNT: 18.9 % (ref 10–15)
GFR SERPL CREATININE-BSD FRML MDRD: 13 ML/MIN/1.7M2
GLUCOSE BLDC GLUCOMTR-MCNC: 104 MG/DL (ref 70–99)
GLUCOSE BLDC GLUCOMTR-MCNC: 115 MG/DL (ref 70–99)
GLUCOSE BLDC GLUCOMTR-MCNC: 115 MG/DL (ref 70–99)
GLUCOSE BLDC GLUCOMTR-MCNC: 56 MG/DL (ref 70–99)
GLUCOSE BLDC GLUCOMTR-MCNC: 76 MG/DL (ref 70–99)
GLUCOSE SERPL-MCNC: 47 MG/DL (ref 70–99)
GRAM STN SPEC: NORMAL
GRAM STN SPEC: NORMAL
HCT VFR BLD AUTO: 24.5 % (ref 40–53)
HGB BLD-MCNC: 7.7 G/DL (ref 13.3–17.7)
IMM GRANULOCYTES # BLD: 0 10E9/L (ref 0–0.4)
IMM GRANULOCYTES NFR BLD: 0 %
LACTATE BLD-SCNC: 0.7 MMOL/L (ref 0.7–2)
LYMPHOCYTES # BLD AUTO: 0.3 10E9/L (ref 0.8–5.3)
LYMPHOCYTES NFR BLD AUTO: 14.3 %
Lab: NORMAL
MAGNESIUM SERPL-MCNC: 1.6 MG/DL (ref 1.6–2.3)
MCH RBC QN AUTO: 30.1 PG (ref 26.5–33)
MCHC RBC AUTO-ENTMCNC: 31.4 G/DL (ref 31.5–36.5)
MCV RBC AUTO: 96 FL (ref 78–100)
MONOCYTES # BLD AUTO: 0.4 10E9/L (ref 0–1.3)
MONOCYTES NFR BLD AUTO: 15.2 %
NEUTROPHILS # BLD AUTO: 1.6 10E9/L (ref 1.6–8.3)
NEUTROPHILS NFR BLD AUTO: 67.4 %
NRBC # BLD AUTO: 0 10*3/UL
NRBC BLD AUTO-RTO: 2 /100
PHOSPHATE SERPL-MCNC: 7.1 MG/DL (ref 2.5–4.5)
PLATELET # BLD AUTO: 40 10E9/L (ref 150–450)
POTASSIUM SERPL-SCNC: 5 MMOL/L (ref 3.4–5.3)
RBC # BLD AUTO: 2.56 10E12/L (ref 4.4–5.9)
SODIUM SERPL-SCNC: 141 MMOL/L (ref 133–144)
SPECIMEN SOURCE: NORMAL
SPECIMEN SOURCE: NORMAL
TRANSFUSION STATUS PATIENT QL: NORMAL
TRANSFUSION STATUS PATIENT QL: NORMAL
URATE SERPL-MCNC: 7.6 MG/DL (ref 3.5–7.2)
WBC # BLD AUTO: 2.3 10E9/L (ref 4–11)

## 2018-01-03 PROCEDURE — 86970 RBC PRETX INCUBATJ W/CHEMICL: CPT | Performed by: INTERNAL MEDICINE

## 2018-01-03 PROCEDURE — 25000132 ZZH RX MED GY IP 250 OP 250 PS 637: Performed by: NURSE PRACTITIONER

## 2018-01-03 PROCEDURE — 87077 CULTURE AEROBIC IDENTIFY: CPT | Performed by: PHYSICIAN ASSISTANT

## 2018-01-03 PROCEDURE — 00000146 ZZHCL STATISTIC GLUCOSE BY METER IP

## 2018-01-03 PROCEDURE — 40000193 ZZH STATISTIC PT WARD VISIT: Performed by: PHYSICAL THERAPIST

## 2018-01-03 PROCEDURE — 97165 OT EVAL LOW COMPLEX 30 MIN: CPT | Mod: GO | Performed by: OCCUPATIONAL THERAPIST

## 2018-01-03 PROCEDURE — 86870 RBC ANTIBODY IDENTIFICATION: CPT | Performed by: INTERNAL MEDICINE

## 2018-01-03 PROCEDURE — 25800025 ZZH RX 258: Performed by: NURSE PRACTITIONER

## 2018-01-03 PROCEDURE — 87205 SMEAR GRAM STAIN: CPT | Performed by: PHYSICIAN ASSISTANT

## 2018-01-03 PROCEDURE — 25000125 ZZHC RX 250: Performed by: PHYSICIAN ASSISTANT

## 2018-01-03 PROCEDURE — 25000128 H RX IP 250 OP 636: Performed by: NURSE PRACTITIONER

## 2018-01-03 PROCEDURE — 80048 BASIC METABOLIC PNL TOTAL CA: CPT | Performed by: NURSE PRACTITIONER

## 2018-01-03 PROCEDURE — 86850 RBC ANTIBODY SCREEN: CPT | Performed by: INTERNAL MEDICINE

## 2018-01-03 PROCEDURE — 87102 FUNGUS ISOLATION CULTURE: CPT | Performed by: PHYSICIAN ASSISTANT

## 2018-01-03 PROCEDURE — 86900 BLOOD TYPING SEROLOGIC ABO: CPT | Performed by: INTERNAL MEDICINE

## 2018-01-03 PROCEDURE — 12000008 ZZH R&B INTERMEDIATE UMMC

## 2018-01-03 PROCEDURE — 36592 COLLECT BLOOD FROM PICC: CPT | Performed by: NURSE PRACTITIONER

## 2018-01-03 PROCEDURE — 25000132 ZZH RX MED GY IP 250 OP 250 PS 637: Performed by: INTERNAL MEDICINE

## 2018-01-03 PROCEDURE — 86902 BLOOD TYPE ANTIGEN DONOR EA: CPT | Performed by: INTERNAL MEDICINE

## 2018-01-03 PROCEDURE — 84100 ASSAY OF PHOSPHORUS: CPT | Performed by: NURSE PRACTITIONER

## 2018-01-03 PROCEDURE — 87106 FUNGI IDENTIFICATION YEAST: CPT | Performed by: PHYSICIAN ASSISTANT

## 2018-01-03 PROCEDURE — 86880 COOMBS TEST DIRECT: CPT | Performed by: INTERNAL MEDICINE

## 2018-01-03 PROCEDURE — 87070 CULTURE OTHR SPECIMN AEROBIC: CPT | Performed by: PHYSICIAN ASSISTANT

## 2018-01-03 PROCEDURE — 84550 ASSAY OF BLOOD/URIC ACID: CPT | Performed by: NURSE PRACTITIONER

## 2018-01-03 PROCEDURE — 86923 COMPATIBILITY TEST ELECTRIC: CPT | Performed by: INTERNAL MEDICINE

## 2018-01-03 PROCEDURE — 36592 COLLECT BLOOD FROM PICC: CPT | Performed by: INTERNAL MEDICINE

## 2018-01-03 PROCEDURE — 86901 BLOOD TYPING SEROLOGIC RH(D): CPT | Performed by: INTERNAL MEDICINE

## 2018-01-03 PROCEDURE — 97535 SELF CARE MNGMENT TRAINING: CPT | Mod: GO | Performed by: OCCUPATIONAL THERAPIST

## 2018-01-03 PROCEDURE — P9040 RBC LEUKOREDUCED IRRADIATED: HCPCS | Performed by: INTERNAL MEDICINE

## 2018-01-03 PROCEDURE — 83605 ASSAY OF LACTIC ACID: CPT | Performed by: INTERNAL MEDICINE

## 2018-01-03 PROCEDURE — 40000133 ZZH STATISTIC OT WARD VISIT: Performed by: OCCUPATIONAL THERAPIST

## 2018-01-03 PROCEDURE — 85025 COMPLETE CBC W/AUTO DIFF WBC: CPT | Performed by: NURSE PRACTITIONER

## 2018-01-03 PROCEDURE — 99233 SBSQ HOSP IP/OBS HIGH 50: CPT | Performed by: INTERNAL MEDICINE

## 2018-01-03 PROCEDURE — 83735 ASSAY OF MAGNESIUM: CPT | Performed by: NURSE PRACTITIONER

## 2018-01-03 PROCEDURE — 97116 GAIT TRAINING THERAPY: CPT | Mod: GP | Performed by: PHYSICAL THERAPIST

## 2018-01-03 PROCEDURE — 97162 PT EVAL MOD COMPLEX 30 MIN: CPT | Mod: GP | Performed by: PHYSICAL THERAPIST

## 2018-01-03 PROCEDURE — 25000128 H RX IP 250 OP 636: Performed by: INTERNAL MEDICINE

## 2018-01-03 PROCEDURE — 76770 US EXAM ABDO BACK WALL COMP: CPT

## 2018-01-03 RX ORDER — LACTULOSE 10 G/15ML
10 SOLUTION ORAL 3 TIMES DAILY PRN
Status: DISCONTINUED | OUTPATIENT
Start: 2018-01-03 | End: 2018-01-04

## 2018-01-03 RX ORDER — MEROPENEM 500 MG/1
500 INJECTION, POWDER, FOR SOLUTION INTRAVENOUS EVERY 12 HOURS
Status: DISCONTINUED | OUTPATIENT
Start: 2018-01-03 | End: 2018-01-04

## 2018-01-03 RX ORDER — HEPARIN SODIUM (PORCINE) LOCK FLUSH IV SOLN 100 UNIT/ML 100 UNIT/ML
5 SOLUTION INTRAVENOUS
Status: DISCONTINUED | OUTPATIENT
Start: 2018-01-03 | End: 2018-01-17 | Stop reason: HOSPADM

## 2018-01-03 RX ORDER — NICOTINE POLACRILEX 4 MG
15-30 LOZENGE BUCCAL
Status: DISCONTINUED | OUTPATIENT
Start: 2018-01-03 | End: 2018-01-17 | Stop reason: HOSPADM

## 2018-01-03 RX ORDER — OXYMETAZOLINE HYDROCHLORIDE 0.05 G/100ML
2 SPRAY NASAL
Status: COMPLETED | OUTPATIENT
Start: 2018-01-03 | End: 2018-01-03

## 2018-01-03 RX ORDER — HEPARIN SODIUM,PORCINE 10 UNIT/ML
5-10 VIAL (ML) INTRAVENOUS EVERY 24 HOURS
Status: DISCONTINUED | OUTPATIENT
Start: 2018-01-03 | End: 2018-01-17 | Stop reason: HOSPADM

## 2018-01-03 RX ORDER — HEPARIN SODIUM,PORCINE 10 UNIT/ML
5-10 VIAL (ML) INTRAVENOUS
Status: DISCONTINUED | OUTPATIENT
Start: 2018-01-03 | End: 2018-01-17 | Stop reason: HOSPADM

## 2018-01-03 RX ORDER — DEXTROSE MONOHYDRATE 25 G/50ML
25-50 INJECTION, SOLUTION INTRAVENOUS
Status: DISCONTINUED | OUTPATIENT
Start: 2018-01-03 | End: 2018-01-17 | Stop reason: HOSPADM

## 2018-01-03 RX ORDER — LACTULOSE 10 G/15ML
10 SOLUTION ORAL 3 TIMES DAILY
Status: DISCONTINUED | OUTPATIENT
Start: 2018-01-03 | End: 2018-01-04

## 2018-01-03 RX ADMIN — ACETAMINOPHEN 650 MG: 325 TABLET ORAL at 08:14

## 2018-01-03 RX ADMIN — SODIUM CHLORIDE: 9 INJECTION, SOLUTION INTRAVENOUS at 07:00

## 2018-01-03 RX ADMIN — LACTULOSE 10 G: 20 SOLUTION ORAL at 19:52

## 2018-01-03 RX ADMIN — PIPERACILLIN SODIUM AND TAZOBACTAM SODIUM 2.25 G: 36; 4.5 INJECTION, POWDER, FOR SOLUTION INTRAVENOUS at 05:51

## 2018-01-03 RX ADMIN — CITALOPRAM HYDROBROMIDE 40 MG: 20 TABLET ORAL at 09:58

## 2018-01-03 RX ADMIN — LACTULOSE 10 G: 20 SOLUTION ORAL at 10:10

## 2018-01-03 RX ADMIN — PANTOPRAZOLE SODIUM 40 MG: 40 TABLET, DELAYED RELEASE ORAL at 16:24

## 2018-01-03 RX ADMIN — SODIUM CHLORIDE, PRESERVATIVE FREE 5 ML: 5 INJECTION INTRAVENOUS at 08:13

## 2018-01-03 RX ADMIN — OXYMETAZOLINE HYDROCHLORIDE 2 SPRAY: 5 SPRAY NASAL at 12:28

## 2018-01-03 RX ADMIN — ACETAMINOPHEN 650 MG: 325 TABLET ORAL at 14:27

## 2018-01-03 RX ADMIN — PIPERACILLIN SODIUM AND TAZOBACTAM SODIUM 2.25 G: 36; 4.5 INJECTION, POWDER, FOR SOLUTION INTRAVENOUS at 00:06

## 2018-01-03 RX ADMIN — PANTOPRAZOLE SODIUM 40 MG: 40 TABLET, DELAYED RELEASE ORAL at 09:57

## 2018-01-03 RX ADMIN — LACTULOSE 10 G: 20 SOLUTION ORAL at 14:27

## 2018-01-03 RX ADMIN — DEXTROSE AND SODIUM CHLORIDE: 5; 450 INJECTION, SOLUTION INTRAVENOUS at 22:30

## 2018-01-03 RX ADMIN — DEXTROSE 15 G: 15 GEL ORAL at 05:02

## 2018-01-03 RX ADMIN — MEROPENEM 500 MG: 500 INJECTION, POWDER, FOR SOLUTION INTRAVENOUS at 10:09

## 2018-01-03 RX ADMIN — DEXTROSE AND SODIUM CHLORIDE: 5; 450 INJECTION, SOLUTION INTRAVENOUS at 10:10

## 2018-01-03 RX ADMIN — MEROPENEM 500 MG: 500 INJECTION, POWDER, FOR SOLUTION INTRAVENOUS at 22:30

## 2018-01-03 NOTE — PROGRESS NOTES
01/03/18 1344   Quick Adds   Type of Visit Initial Occupational Therapy Evaluation   Living Environment   Lives With spouse   Living Arrangements house   Number of Stairs to Enter Home 0   Number of Stairs Within Home 0   Living Environment Comment Walk-in shower, shower bench   Self-Care   Usual Activity Tolerance moderate   Current Activity Tolerance fair   Regular Exercise no   Equipment Currently Used at Home cane, straight   Activity/Exercise/Self-Care Comment Patient completes basic ADLs and shower task standing with SBA from spouse.   Functional Level Prior   Ambulation 1-->assistive equipment   Transferring 1-->assistive equipment   Toileting 0-->independent   Bathing 0-->independent   Dressing 0-->independent   Eating 0-->independent   Communication 0-->understands/communicates without difficulty   Swallowing 0-->swallows foods/liquids without difficulty   Cognition 0 - no cognition issues reported   Fall history within last six months yes   Number of times patient has fallen within last six months 1   Prior Functional Level Comment Patient completes basic ADLs and shower task standing with SBA from spouse.   General Information   Onset of Illness/Injury or Date of Surgery - Date 01/02/18   Referring Physician Dr Kyle   Patient/Family Goals Statement return home to baseline   Additional Occupational Profile Info/Pertinent History of Current Problem Jeffrey Real is a 53 y/o M with complex PMH including refractory IgA Multiple Myeloma s/p autologous transplant 08/2014, most recently on Daralex/Pom/Decadron admitted from OSH r/t CHRISTIANE on CKD, persistent sinusitis, HCAP, concern for sepsis, & altered mental status.  Had pelvic fx in July due to fall.   Precautions/Limitations fall precautions;spinal precautions  (spouse reports T1/T2 fx)   General Observations On RA, having blood transfusion.   Cognitive Status Examination   Orientation orientation to person, place and time   Level of Consciousness  lethargic/somnolent;confused   Able to Follow Commands moderate impairment   Sensory Examination   Sensory Comments No N/T noted   Pain Assessment   Patient Currently in Pain Yes, see Vital Sign flowsheet   Range of Motion (ROM)   ROM Comment B UE AROM WFL   Mobility   Bed Mobility Bed mobility skill: Sit to supine;Bed mobility skill: Supine to sit   Bed Mobility Skill: Sit to Supine   Level of [place]: Sit/Supine minimum assist (75% patients effort)   Bed Mobility Skill: Supine to Sit   Level of [place]: Supine/Sit minimum assist (75% patients effort)   Transfer Skill: Bed to Chair/Chair to Bed   Level of [place]: Bed to Chair contact guard   Assistive Device - Transfer Skill Bed to Chair Chair to Bed Rehab Eval standard walker   Transfer Skill: Sit to Stand   Level of [place]: Sit/Stand stand-by assist   Assistive Device for Transfer: Sit/Stand standard walker   Toilet Transfer   Toilet Transfer Comments Not assessed   Bathing   Level of [place] moderate assist (50% patients effort)   Upper Body Dressing   Level of [place]: Dress Upper Body minimum assist (75% patients effort)   Lower Body Dressing   Level of [place]: Dress Lower Body moderate assist (50% patients effort)   Toileting   Level of [place]: Toilet stand-by assist   Grooming   Level of [place]: Grooming minimum assist (75% patients effort)   Eating/Self Feeding   Level of [place]: Eating stand-by assist   Activities of Daily Living Analysis   Impairments Contributing to Impaired Activities of Daily Living cognition impaired;strength decreased   General Therapy Interventions   Planned Therapy Interventions ADL retraining;strengthening   Clinical Impression   Criteria for Skilled Therapeutic Interventions Met yes, treatment indicated   OT Diagnosis impaired self-cares/mobility   Influenced by the following impairments weakness; cognition   Assessment of Occupational Performance 1-3 Performance Deficits  "  Identified Performance Deficits dressing, bathing, toileting   Clinical Decision Making (Complexity) Low complexity   Therapy Frequency other (see comments)  (6x/week)   Predicted Duration of Therapy Intervention (days/wks) 1 week   Anticipated Discharge Disposition Home with Assist;Transitional Care Facility   Risks and Benefits of Treatment have been explained. Yes   Patient, Family & other staff in agreement with plan of care Yes   [facility] TM \"6 Clicks\"   2016, Trustees of [facility], under license to [employer].  All rights reserved.   6 Clicks Short Forms Daily Activity Inpatient Short Form   [facility]  \"6 Clicks\" Daily Activity Inpatient Short Form   1. Putting on and taking off regular lower body clothing? 2 - A Lot   2. Bathing (including washing, rinsing, drying)? 2 - A Lot   3. Toileting, which includes using toilet, bedpan or urinal? 3 - A Little   4. Putting on and taking off regular upper body clothing? 3 - A Little   5. Taking care of personal grooming such as brushing teeth? 3 - A Little   6. Eating meals? 4 - None   Daily Activity Raw Score (Score out of 24.Lower scores equate to lower levels of function) 17   Total Evaluation Time   Total Evaluation Time (Minutes) 8     "

## 2018-01-03 NOTE — CONSULTS
Otolaryngology Consult Note  January 3, 2018      CC: sinus mucosal thickening seen on CT imaging     HPI: Jeffrey Real is a 52 year old male with a past medical history of refractory IgA multiple myeloma s/p autologous transplant 08/2014 recently treated with chemotherapy (daratumumab/pomalidomide/dexamethasone) in November 2017 with subsequent neutropenia. He was transferred to [facility] from OSH yesterday with CHRISTIANE on CKD, HCAP with concern for sepsis and AMS. A head CT was performed yesterday on arrival due to AMS which showed thickening in the maxillary and sphenoid sinuses. ENT was consulted for further evaluation of the sinuses as possible source of infection. Has had low grade temperatures of 100.7F since admission and remains neutropenic with WBC 2.3. Per primary team, patient's wife reported ongoing sinus symptoms of pain/pressure and nasal drainage over the last month and states he has been treated with multiple courses of antibiotics. Per care everywhere review, patient was recently admitted to OSH December 25-30 for sepsis due to pneumonia and sinusitis and treated with azithromycin and Augmentin. He became progressively weaker with AMS which prompted readmission 1/1/18.    Patient currently denies headache, facial pain, nasal drainage, post-nasal drainage, facial numbness/tingling. Patient does appear to be somewhat confused. No family at bedside.     No past medical history on file.    No past surgical history on file.    No current outpatient prescriptions on file.          Allergies   Allergen Reactions     Blood-Group Specific Substance      Other reaction(s): Other - Describe In Comment Field  Patient has a Nonspecific antibody. Blood Product orders may be delayed.  Draw one red top and two purple top tubes for ALL Type and Screen/ Type and Crossmatch orders.     Chlorhexidine      Other reaction(s): Irritation At Patch Site  Itching with Prevantic Swabs     Levofloxacin Rash     Stopped levaquin and  "rash resolved by 8/8/14       Social History     Social History     Marital status:      Spouse name: N/A     Number of children: N/A     Years of education: N/A     Occupational History     Not on file.     Social History Main Topics     Smoking status: Never Smoker     Smokeless tobacco: Not on file     Alcohol use Not on file     Drug use: Not on file     Sexual activity: Not on file     Other Topics Concern     Not on file     Social History Narrative       No family history on file.    ROS: Unable to perform adequate ROS due to AMS/confusion    PHYSICAL EXAM:  General: laying in bed, no acute distress, confused  /88  Pulse 109  Temp 99.4  F (37.4  C)  Resp 20  Ht 1.778 m (5' 10\")  Wt 80 kg (176 lb 5.9 oz)  SpO2 98%  BMI 25.31 kg/m2  HEAD: normocephalic, atraumatic  Face: symmetrical, CN VII intact bilaterally (HB 1), no swelling, edema, or erythema. Sensation V1-V3 intact and equal bilaterally.   Eyes: EOMI without spontaneous or gaze evoked nystagmus, PERRL, clear sclera  Ears: no tragal tenderness, external ear canal open and clear bilaterally, TMs clear bilaterally, hearing grossly intact  Nose: no anterior drainage or bleeding, intact and midline septum without perforation or hematoma. See nasal endoscopy below.  Mouth: moist, no ulcers, no jaw or tooth tenderness, tongue midline and symmetric, hard and soft palate sensation intact  Oropharynx: tonsils within normal limits, uvula midline, no oropharyngeal erythema, no drainage in the posterior oropharynx  Neck: no LAD, trachea midline  Neuro: cranial nerves 2-12 grossly intact  Respiratory: breathing non-labored on RA, no stridor    NASAL ENDOSCOPY:  Due to concern for possible sinusitis in the setting of neutropenia, nasal endoscopy was indicated. After obtaining verbal consent, the nose was topically decongested. The zero degree sinoscope was passed under endoscopic vision through the left nasal passage. The turbinates were normal. The " inferior and middle meati were clear bilaterally without purulence, masses, or polyps. The nasopharynx was clear. Sensation intact and equal throughout. Mild oozing over the middle turbinate when touched with the scope. The scope was then passed under endoscopic vision through the right nasal passage. There is a small septal spur narrowing the nasal passage. Moderate edema of the inferior and middle turbinates. Small amount of white secretions seen over the middle turbinate and along nasal floor, no obvious drainage from the middle meatus. No masses or polyps. Sensation intact and equal throughout. Mild oozing seen from the middle turbinate and septum with passage of scope along these areas.     ROUTINE IP LABS (Last four results)  BMP  Recent Labs  Lab 01/03/18  0358 01/02/18  1625    138   POTASSIUM 5.0 5.0   CHLORIDE 113* 110*   PARADISE 9.8 10.0   CO2 15* 18*   BUN 30 29   CR 4.73* 4.38*   GLC 47* 52*     CBC  Recent Labs  Lab 01/03/18  0358 01/02/18  1625   WBC 2.3* Duplicate request  1.6*   RBC 2.56* Duplicate request  2.79*   HGB 7.7* Duplicate request  8.1*   HCT 24.5* Duplicate request  26.5*   MCV 96 Duplicate request  95   MCH 30.1 Duplicate request  29.0   MCHC 31.4* Duplicate request  30.6*   RDW 18.9* Duplicate request  18.4*   PLT 40* Duplicate request  35*     INR  Recent Labs  Lab 01/02/18  1625   INR 1.38*       Imaging:  CT head w/o contrast 1/2/17:  Impression:   1. No acute intracranial pathology.  2. Mild chronic small vessel ischemic disease.  3. Layering paranasal sinus fluid and mucosal thickening, which can be  seen in the setting of acute sinusitis.  4. Innumerable lytic lesions consistent with history of multiple  myeloma.    Assessment and Plan  Jeffrey Real is a 52 year old male with a past medical history of refractory IgA multiple myeloma s/p autologous transplant 08/2014 recently treated with chemotherapy (daratumumab/pomalidomide/dexamethasone) in November 2017 with  subsequent neutropenia. He was transferred to [facility] from OSH yesterday with CHRISTIANE on CKD, HCAP with concern for sepsis and AMS. ENT was consulted for further evaluation of the sinuses as potential source of infection. CT head imaging from 1/2/18 personally reviewed and reveals b/l sphenoid and maxillary sinus mucosal thickening, no bony erosive changes. Patient currently denying any sinonasal complaints.  Nasal endoscopy reveals mild inflammation in the right nasal cavity, no obvious purulent drainage to suggest acute sinusitis. A culture was obtained from the right middle meatus. No findings concerning for necrosis or invasive fungal infection. Given the relatively benign nature of the patient's nasal exam and imaging findings, the sinuses are unlikely to be the source of patient's sepsis/AMS. Patient is currently receiving IV meropenem for HCAP which should cover typical bacterial sinus pathogens.     - Follow up sinus culture obtained at bedside today  - Recommend Afrin nasal spray to right nare BID x 3 days  - Recommend ocean saline nasal spray Q4H while awake  - Remainder of care per primary team, please do not hesitate to contact ENT with questions/concerns    -- Patient and above plan to be discussed with ENT attending on call, Dr. Rowland.    Dorothy Brambila PA-C  Otolaryngology-Head & Neck Surgery  Please page ENT with questions by dialing [phone] and entering job code [phone] when prompted.

## 2018-01-03 NOTE — CONSULTS
Nephrology Initial Consult  January 2, 2018      Jeffrey Real MRN:[id number] YOB: 1965  Date of Admission:1/2/2018  Primary care provider: Hipolito Rollins  Requesting physician: Luis Fernando Levy MD    ASSESSMENT AND RECOMMENDATIONS:     1. CHRISTIANE - Etiology likely 2/2 contrast, hypercalcemia. He may also have a pre renal component given that he is slowly improving with IVF. He does have h/o CHRISTIANE requiring RRT in Feb 2017. He did have a mild CHRISTIANE in Nov with peak creat of 1.6 on 11/25. Unfortunately did not have another creat drawn following the contrast on 12/27/17.   - No indication for RRT at this time. He is voiding, his acidosis, K, Ca and creat have all improved with ongoing rehydration   - Would continue IVF for now and reassess in AM   - Wife and patient agreeable to RRT if renal function deteriorates   - Will check Fena, RENU, UA, Renal US   - Continue to monitor renal function for recovery with daily chemistry labs   - Avoid nephrotoxins, hypotension   - Renal dose medications    2. Volume status - He appears euvolemic. No edema, hypoxia. Blood pressure is 140/80. He is voiding   - Continue IVF   - Chan for accurate I/O   - Standing daily weights   - Strict I/O    3. Electrolytes - No acute concerns. K 5.0, Na 138    4. Acid base - Bicarb 18. Etiology likely metabolic acidosis 2/2 CHRISTIANE, but blood gasses would confirm.    - No need for intervention at this time    5. BMD - Hypercalemia is chronic, however, more elevated over the last few days. Dehydration may be making this worse. It is improving with IVF. He was taking Ca/D at home, but denies additional Ca. Has known h/o lytic lesions.    - Hypercalcemia can cause CHRISTIANE so will have to monitor closely   - Phos 7. Etiology CHRISTIANE. From a renal standpoint does not need to be treated.     6. Anemia - Hgb 8.9. Has known pancytopenia 2/2 MM and chemo. Hgb goal is > 8   - Transfusions per Oncology    7. Chronic pain - Was taking scheduled Oxycontin/  Gabapentin and prn Dilaudid. All have been discontinued.    - May have some AMS given opiates in setting of CHRISTIANE   - Would image brain to r/o pathology   - Would monitor for withdrawal. He almost looks like he is in withdrawal, ie: tachy, elevated blood pressure, altered, myoclonic    8. Multiple Myeloma - Per primary team. No h/o Myeloma kidney. Total protein 10.7. Has been in the 10-11 range since 9/17. Albumin however is much lower than usual. Bili and enzymes normal. LD is 464.     9. HTN - B/P 140/83, . PTA meds: Diltiazem 60 mg bid.     Recommendations were communicated to primary team directly    Will staff in AM    Humaira Crisostomo, NP   [phone]    REASON FOR CONSULT: CHRISTIANE    HISTORY OF PRESENT ILLNESS:    Jeffrey Real is a 52 year old male with known relapsed MM on chemo, recent hospital admission for sepsis, discharged to home on 12/30 on a Zpack and readmitted on 1/1/18. Wife notes that patient was not at his cognitive baseline when he was discharged and became worse prompting readmission. She states that he was drinking fluids normally and voiding w/o difficulty while at home. He has been voiding w/o martinez during admission. No diarrhea/vomiting. He is on scheduled Calcium at home as well as scheduled Oxycontin, Gabapentin and prn Dilaudid.     Baseline creat 0.8-1.0.  Creat was 1.1 on 12/27/17. He underwent a CT with contrast that day. There was no creat drawn on the day of discharge. Next creat was on 1/1/18 and had risen to 4.4. He was given Vanco in ED on 1/1/18 and IVF. UA on 1/1 only showed pro 30 and small blood. Labs today prior to transfer showed Creat of 4.5, K 5.4, bicarb 15, BUN 28, Uncorrected Ca 10.8, phos 7.0    Repeat labs here this afternoon showing Creat 4.3, K 5.0, Ca (uncorrected) 10.0, Phos 7.0, Bicarb 18. He has voided w/o difficulty per wife and 250 cc has been recorded thus far. He is tachy and B/P 140/83 despite not being on Lopressor or Diltiazem.     Follows with   Y[facility] for his Myeloma. Has known resistant MM IgA kappa with multiple lytic lesions. M spike has been rising. Per Onc, plan was to get M spike < 1 gram % then pursue Allogeneic transplant.     PAST MEDICAL HISTORY:  Reviewed with wife and Epic    Multiple Myeloma, s/p Stem cell transplant 2014  Relapsing MM currently on chemo  S/P ICD  H/O CHRISTIANE requiring RRT x 2 months in Feb/March 2017  Depression  Chronic pain  Chronic fatigue  HTN    MEDICATIONS:  PTA Meds  Prior to Admission medications    Medication Sig Last Dose Taking? Auth Provider   ACYCLOVIR PO Take 400 mg by mouth 2 times daily  Yes Unknown, Entered By History   oxyCODONE (OXYCONTIN) 20 MG 12 hr tablet Take 20 mg by mouth every evening  Yes Unknown, Entered By History   acetaminophen (TYLENOL) 500 MG tablet Take 1,000 mg by mouth   Reported, Patient   Calcium Carb-Cholecalciferol (CALCIUM-VITAMIN D3) 600-400 MG-UNIT CAPS    Reported, Patient   Misc. Devices (QUAD CANE) MISC Wide Base Quad Cane for home use. For 6 weeks.   Reported, Patient   dexamethasone (DECADRON) 4 MG tablet Every Tuesday   Reported, Patient   diltiazem (CARDIZEM) 30 MG tablet Take 60 mg by mouth   Reported, Patient   HYDROmorphone (DILAUDID) 2 MG tablet Take 2 mg by mouth   Reported, Patient   loperamide (IMODIUM A-D) 2 MG tablet Take 2 mg by mouth   Reported, Patient   ondansetron (ZOFRAN-ODT) 4 MG ODT tab Place 4 mg under the tongue   Reported, Patient   pantoprazole (PROTONIX) 40 MG EC tablet Take 40 mg by mouth   Reported, Patient   pomalidomide (POMALYST) 3 MG capsule Take 3mg daily, days 1-21 every 28 days.   Reported, Patient   oxyCODONE (OXYCONTIN) 10 MG 12 hr tablet Take 10 mg by mouth every morning    Reported, Patient   Lenalidomide (REVLIMID PO) Take by mouth daily Takes 21 days, then 7 days off.   Reported, Patient   GABAPENTIN PO Take 300 mg by mouth 2 times daily    Reported, Patient   FAMCICLOVIR PO Take by mouth daily   Reported, Patient   CITALOPRAM HYDROBROMIDE  "PO Take by mouth daily   Reported, Patient   METOPROLOL TARTRATE PO Take by mouth daily   Reported, Patient   OMEPRAZOLE PO Take by mouth every morning   Reported, Patient   TRAZODONE HCL PO Take by mouth nightly as needed for sleep   Reported, Patient   LORazepam (ATIVAN PO) Take by mouth At Bedtime   Reported, Patient   OXYCODONE HCL PO    Reported, Patient      Current Meds    [START ON 1/3/2018] citalopram (celeXA) tablet 40 mg  40 mg Oral Daily     pantoprazole  40 mg Oral BID AC     piperacillin-tazobactam  2.25 g Intravenous Q6H     Infusion Meds    - MEDICATION INSTRUCTIONS -       NaCl 125 mL/hr at 18 1604       ALLERGIES:    Allergies   Allergen Reactions     Blood-Group Specific Substance      Other reaction(s): Other - Describe In Comment Field  Patient has a Nonspecific antibody. Blood Product orders may be delayed.  Draw one red top and two purple top tubes for ALL Type and Screen/ Type and Crossmatch orders.     Chlorhexidine      Other reaction(s): Irritation At Patch Site  Itching with Prevantic Swabs     Levofloxacin Rash     Stopped levaquin and rash resolved by 14       REVIEW OF SYSTEMS:  Patient unreliable historian given AMS.     SOCIAL HISTORY:   Social History     Social History     Marital status:      Spouse name: N/A     Number of children: N/A     Years of education: N/A     Occupational History     Not on file.     Social History Main Topics     Smoking status: Never Smoker     Smokeless tobacco: Not on file     Alcohol use Not on file     Drug use: Not on file     Sexual activity: Not on file     Other Topics Concern     Not on file     Social History Narrative     PHYSICAL EXAM:   Temp  Av.3  F (37.4  C)  Min: 99.3  F (37.4  C)  Max: 99.3  F (37.4  C)      Pulse  Av  Min: 103  Max: 103 Resp  Av  Min: 16  Max: 16  SpO2  Av %  Min: 96 %  Max: 96 %       /83  Pulse 103  Temp 99.3  F (37.4  C) (Axillary)  Resp 16  Ht 1.778 m (5' 10\")  SpO2 " 96%   Date 01/02/18 0700 - 01/03/18 0659   Shift 5818-1800 7986-5469 1367-7036 24 Hour Total   I  N  T  A  K  E   Shift Total       O  U  T  P  U  T   Urine  250  250    Shift Total  250  250   Weight (kg)            Admit       GENERAL APPEARANCE: awake but drowsy. Family present  EYES: no scleral icterus, pupils equal  Pulmonary: lungs clear to auscultation. Breathing is non labored. Not on supplemental oxygen.   CV: tachy   - Edema - none  GI: soft, nontender, non distended  MS: no evidence of inflammation in joints, no muscle tenderness  : voiding w/o martinez  SKIN: no rash, warm, dry, no cyanosis  NEURO: altered but interactive. Oriented to family members, year. Having mild myoclonus activity    LABS:   CMP  Recent Labs  Lab 01/02/18  1625      POTASSIUM 5.0   CHLORIDE 110*   CO2 18*   ANIONGAP 10   GLC 52*   BUN 29   CR 4.38*   GFRESTIMATED 14*   GFRESTBLACK 17*   PARADISE 10.0   MAG 1.5*   PHOS 7.0*   PROTTOTAL 10.7*   ALBUMIN 1.6*   BILITOTAL 0.6   ALKPHOS 131   AST 30   ALT 12     CBC  Recent Labs  Lab 01/02/18  1625   HGB 7.9*   WBC 1.7*   RBC 2.64*   HCT 25.2*   MCV 96   MCH 29.9   MCHC 31.3*   RDW 18.5*   PLT 34*     INR  Recent Labs  Lab 01/02/18  1625   INR 1.38*   PTT 50*     ABGNo lab results found in last 7 days.   URINE STUDIES  No lab results found.  No lab results found.  PTH  No lab results found.  IRON STUDIES  No lab results found.    Humaira Crisostomo, DIANE    I have discussed this pt with the NP on 1/2 and seen the patient on 1/3 as part of a shared visit.  This note reflects our joint assessment and plan.      53 yo M with metastatic myeloma adm 1/2 with AMS and CHRISTIANE.  High myeloma protein burden and had contrast study 1/27 that coincides with Cr rise.  Hydration ON did not improve renal function.      PE: drowsy  T max 100.2  /80 with stable BP  Lungs clear  Cor RSR  No edema    Labs reviewed Cr 4.7, prot 10.7. Alb 1.6, io calcium 5.1    Impression:  1.  CHRISTIANE in setting of myeloma,  volume depletion and contrast.  Likely has intratubular precipitation of myeloma proteins causing CHRISTIANE.    - rec increased IVF and also lasix to increase UOP to >100 cc /hr   - pt agreeable to HD if needed  2.  Volume status: mild volume depletion    Sadia Ayala MD

## 2018-01-03 NOTE — PLAN OF CARE
Problem: Renal Failure/Kidney Injury, Acute (Adult)  Goal: Signs and Symptoms of Listed Potential Problems Will be Absent, Minimized or Managed (Renal Failure/Kidney Injury, Acute)  Signs and symptoms of listed potential problems will be absent, minimized or managed by discharge/transition of care (reference Renal Failure/Kidney Injury, Acute (Adult) CPG).     2644-6427:  Continues w/ telemetry, continues w/ sinus tach; OVSS, afebrile. Triggered sepsis, lactic acid 0.7. Pt continues to be lethargic & disorientated to place & time. Bed alarm on overnight for safety. Wife at bedside. Pt incontinent of urine x2 overnight. Pt having adequate UOP & bladder scans show pt not retaining. Continue to offer bathroom q 2-3hrs. Small loose BM overnight. BG 47 this am. A total of 3 cartons of apple juice, 1/4th of a gram cracker packet, & 15g glucose gel given; w/ rechecks of 56, 76, & 104. Hgb 7.7, prepare & transfuse order released, but screening resulted in positive for antibodies, so blood bank will have to receive from [employer]. Respiratory came this am to induce coughing for sputum sample, but due to time of the morning & pt's lethargy, they will come back later. Continue to monitor & w/ POC.

## 2018-01-03 NOTE — PROGRESS NOTES
"CLINICAL NUTRITION SERVICES - ASSESSMENT NOTE     Nutrition Prescription    RECOMMENDATIONS FOR MDs/PROVIDERS TO ORDER:  - Recommend obtaining calorie counts if pt remains hospitalized > 72 hrs to help further assess po adequacy and need for alternative nutrition support.  - Recommend monitoring K+, Mg and PO4 levels d/t risk for increased losses secondary to stooling d/t lactulose     Malnutrition Status:    Non-severe malnutrition in the context of acute illness      Recommendations already ordered by Registered Dietitian (RD):  Modified supplement order for pt to receive Ensure Plus at 2 pm daily.  Order entered for Room Service with Assist to ensure pt receives consistent meal trays TID.    Future/Additional Recommendations:  Need for TF if calorie counts results show pt meeting < 75% of estimated nutritional needs (< 1500 calories and 60 g PRO.     REASON FOR ASSESSMENT  Jeffrey Real is a/an 52 year old male assessed by the dietitian for Admission Nutrition Risk Screen for unintentional loss of 10# or more in the past two months    NUTRITION HISTORY  Unable to obtain diet hx from pt d/t lethargy. Per pt's spouse, reported that pt has had decreased FIONA over the past month which has resulted in sporadic po intakes.     CURRENT NUTRITION ORDERS  Diet: Regular with Snacks/Supplements with meals  Intake/Tolerance: Pt's spouse reported that pt drank a smoothie early today, but otherwise, informed that pt eating small amounts of food when prompted by family members.     LABS  PO4 7.1 (high) today; Per Nephrology note on 1/2, no need for treatment at this time  Ammonia 70 (high) on 1/2, no new data today. On lactulose 10 mg TID to management of hyperammonemia. Due to risk for increased stooling on lactulose, will need to monitor K+, Mg and PO4 for depletion.    MEDICATIONS  Medications reviewed    ANTHROPOMETRICS  Height: 177.8 cm (5' 10\")  Most Recent Weight: 80 kg (176 lb 5.9 oz) - today's. No wt obtained on admit " on 1/2  IBW: 75.5 kg  BMI: Overweight BMI 25-29.9  Weight History: Pt's wife reported that pt weighed 167 lbs last week and suspect current wt of 176 lbs d/t fluid. Previously weight 184 lbs in Nov. Wt loss of 17 lbs (9% loss) x past 4 weeks.  Wt Readings from Last 10 Encounters:   01/03/18 80 kg (176 lb 5.9 oz)   11/27/17 83.8 kg (184 lb 11.2 oz)   12/03/15 97.3 kg (214 lb 8.1 oz)       Dosing Weight: 80 kg    ASSESSED NUTRITION NEEDS  Estimated Energy Needs: 8891-7610 kcals/day (25 - 30 kcals/kg)  Justification: Maintenance  Estimated Protein Needs: 80-96 grams protein/day (1 - 1.2 grams of pro/kg)  Justification: Maintenance pending renal function  Estimated Fluid Needs: (1 mL/kcal)   Justification: Maintenance    PHYSICAL FINDINGS  See malnutrition section below.    MALNUTRITION  % Intake: < 75% for >/= 1 month (non-severe)  % Weight Loss: > 5% in 1 month (severe)  Subcutaneous Fat Loss: Facial region: Mild  Muscle Loss: Facial region; Mild  Fluid Accumulation/Edema: None noted   Malnutrition Diagnosis: Non-severe malnutrition in the context of acute illness    NUTRITION DIAGNOSIS  Unintended weight loss related to suspect inconsistent nutritional intakes d/t altered mentation/lethary as evidenced by Wt loss of 17 lbs (9% loss) x past 4 weeks.    INTERVENTIONS  Implementation  Nutrition Education: Provided education to pt's spouse on the importance of pt needing to consume consistent meals intakes in order to prevent wt loss.   Medical food supplement therapy - as outlined above  Room Service with Assists    Goals  1. Wt not to decline < 80 kg  2. Patient to consume  >75% of nutritionally adequate meal trays TID, or the equivalent with supplements/snacks.     Monitoring/Evaluation  Progress toward goals will be monitored and evaluated per protocol.  Shanice Benz RD,LD  Pager [phone]

## 2018-01-03 NOTE — PLAN OF CARE
Problem: Patient Care Overview  Goal: Plan of Care/Patient Progress Review  Discharge Planner OT   Patient plan for discharge: Unsure, wanted to go to TCU after last hospitalization but was not covered due to chemo  Current status: Patient sleepy, needing simple verbal cues and at times physical cues for sit to stand and basic ADLs standing at sink. Patient was able to stand at sink 20 minutes with SBA/CGA. Spouse/daughter present at very suuportive.  Barriers to return to prior living situation: Fatigue/cognitive deficits  Recommendations for discharge: Home vs. TCU, will continue to monitor cognition and strength  Rationale for recommendations: Continued OT 6x/week for increased independence in ADLs/mobility       Entered by: Krystal Powell 01/03/2018 2:25 PM

## 2018-01-03 NOTE — PROGRESS NOTES
Hematology / Oncology Progress Note   Date of Service: 01/03/2018     Assessment & Plan Jeffrey Real is a 51 y/o M with complex PMH including refractory IgA Multiple Myeloma s/p autologous transplant 08/2014, most recently on Daralex/Pom/Decadron admitted from OSH r/t CHRISTIANE on CKD, persistent sinusitis, HCAP, concern for sepsis, & altered mental status.     # Altered mental status. Very lethargic/sleepy on exam. Hyperreflexic. Clonus. Likely multifactorial (kidney injury, long acting pain medications, hypercalcemia, infectious-sinusitis/PNA).  - Nephrology consult, ENT consult.   - Check ionized calcium 5.1 treat with fluids, Ammonia 70 treat with lactulose.   - Head CT w/o contrast shows no evidence of acute intracranial pathology but + sinusitis & mild chronic small vessel ischemic disease.  - Hold oxycodone, dilaudid, ativan & gabapentin r/t sedating effects/CHRISTIANE, not concerned about withdrawal symptoms at this time.   - Blood culture NTD, lactic acid 0.7, procalcitonin 0.8.   - Repeat UA negative for LE, negative for N, 1 WBC, few bacteria, culture is pending.   - Raissat said hyperviscosity unlikely as IgA. Per Nishai note: Most recently, his M-spike has been increasing; in fact, as of 11/13/2017 his gamma peak was 6.24gm%.  He had three monoclonal peaks of 2.6, 2.0 and 1.0gm%, IgA 5210.  His kappa free light chains in the serum were 216, lambda 0.11, with a kappa/lambda ratio of 1963.64.      # Hypoglycemia. Likely r/t poor PO intake above.   - D51/2 NS at 100 ml/hr.  - Repeat blood sugars q 4 hours.      # High Risk/Resistant Multiple Myeloma IgA kappa. S/P autologous peripheral blood stem cell transplant in 08/2014.   # Multiple lytic lesions 2/2 MM (ribs, T7, T9, T11, T12) s/p XRT to thoracolumbar spine.  # Fracture L ischium.  # Hypogammaglobulinemia s/p IVIG replacement.  S/P multiple lines of therapy including auto tx (RVD, auto+melphalan, Velcade Maintenance, VDT-PACE, followed by PCD) complex  cytogenetics (17 p deletion, p53 deletion) most recently on pomalidomide/carfilzomib & dex 20+ cycles (started 02/2017) with progression in November 2017 was then switched to daratumumab/pomalidomide & dex.  - Plan was to try to get M-spike < 1 gram% then pursue Allogeneic transplant (saw Yelitza SANDY in clinic 11/27/17). Has brother as haplo-identical donor.  - 12/26/17 found to have pathologic fractures of T2 spinous process & bilateral T1 transverse process. Destructive lytic lesion involving left scapula partially imaged.      # Pancytopenia 2/2 to MM & related therapy. Pomalyst most recently. Recommended dose decrease with subsequent cycles.  - CBC w differential daily.  - Keep HgB > 8, PLT > 10.   - Will get PRBC today (unable to get last night r/t antibodies), HgB 7.7. PLT 40. WBC 2.3/ANC 1.6.     # Acute Kidney Injury/Failure. Cr 4.5 at OSH (up from baseline 0.8-1.1). Likely r/t contrast (got facial bone contrast CT on 12/27 at OSH, + underlying MM) vs. worsening MM vs. Sepsis/infection. No evidence of hypotension in OSH records. Unable to see if he was anuric at OSH.  # Hyperkalemia.  # Hyperphosphatemia.   # Hyperuricemia.  # Chronic kidney disease 2/2 to MM.  # Hx. CHRISTIANE requiring HD from sepsis.   - Avoid nephrotoxins & contrast.  - Consult nephrology, renal US pending, FENa 16.1, UCr22, Michel 109.   - BMP daily.  - Telemetry continues  this AM.   - Patient continues to void, incontinent despite scheduled toileting.   - IVF at 100 ml/hr (did D5 r/t hypoglycemia overnight, poor PO intake).      # Hypercalcemia on Zometa. Last dose of Zometa was 12/7/17, on monthly schedule.  - Continue IVF, ICa was 5.1.     # HCAP & Sinusitis. Streaky R lung base opacity. S/P multiple recurrent hospitalizations. Most recently 12/11-12/21, 12/25-12/30, & now 1/1- current (transfer from OSH). CT sinus + for sinusitis (most prominent in maxillary & sphenoid sinuses. Now with head CT 1/2/18 showing persistent sinusitis  (layering paranasal sinus fluid & mucosal thickening).   - Urine & blood CX NTD from OSH. No sputum Cx recorded.   - Recurrent admissions for sepsis/infection (received multiple lines of antibiotics), recurrent pancytopenia 2/2 to therapy, will continue PTA zosyn (good anaerobic coverage in setting of sinusitis). -- Changed to zosyn today with persistent fevers (hx. Pseudomonas infection in past per wife at [facility] when was septic/required MV).  - ENT consult today to evaluate imaging, need for scope, should we r/o fungal infection (?).  - Consider sending Asp Gal & 1,3 BDF.   - Consider ID consult.   - Sputum culture ordered to be induced by RT (collection pending).  - Repeat blood culture & UA/UC ordered (NTD, monitor cultures).  - Chest CT (?) no contrast with CHRISTIANE deferred per Eckfeldt.     # Hyperammonemia. Ammonia was 70 on admission. LFT normal. INR. Also hypoglycemic overnight poor PO intake. Concern for sepsis. Azotemia + with CHRISTIANE.   - Lactulose TID, with PRN to titrate to 3-4 stools per day, repeat lactulose 1/4 AM.     # ID PPX. Continue Acyclovir 400 mg BID.      # GERD, hx. Candida esophagitis.  - Continue protonix 40 mg EC BID.      # Hx. Vtach s/p ICD placement.  # Hx. HTN.  - Diltiazem 60 mg BID (PTA) on hold, monitoring BP for sepsis r/t infection.   - Remote history of Metoprolol use but not seen in last 2 hospitalization/clinic notes from December.    # Chronic pain 2/2 to MM. Multiple fractures/lytic lesions.  HOLD PTA MEDS: home oxyCODONE (OXYCONTIN) 10 mg SUSTAINED release tablet Take 10 mg in the AM and 20 mg (2 tablets) in the PM. HYDROmorphone (DILAUDID) 2 mg tablet Take 1 tablet by mouth every 4 hours if needed.  - Consider palliative care consult.     # Peripheral neuropathy 2/2 to MM therapy. Hold gabapentin (NEURONTIN) 300 mg capsule Take 1 capsule by mouth 2 times daily.    # Major depression. Continue citalopram (CELEXA) 40 mg tablet Take 1 tablet by mouth once daily.     #  Weakness/deconditioning 2/2 to multiple hospitalizations, MM/therapy, & chronic disease.  # Chronic fatigue.  - PT/OT consult.     FEN: D5 1/2 NS at 100 ml/hr.   - Regular diet as tolerated.  - No electrolyte replacement r/t CHRISTIANE, hyperkalemia, hyperphosphatemia.    # Mild-Moderate Malnutrition 2/2 to chronic disease/infections, multiple hospitalizations.  - Regular diet, encourage snacks/supplements.  - Consider K elisabeth counts/nutrition consult.     PPX (DVT/GI): protonix BID, mechanical DVT r/t TCP.  LINES/DRAINS: Port.  CONSULTS: Nephrology, ENT, OT, PT.  CODE: Full.  DISPO: Need to continue to evaluate cause of CHRISTIANE, treat & work up etiology of acute sinusitis (ENT) & PNA. Decide if kidney injury is reversible. Has persistent MM, limited options/not a candidate for therapy at this point.     Cynthia Tenoriobrice, [facility], [phone].  Hematology/Oncology, January 3, 2018.    Interval history  Developed fevers this .7. SBP stable 140's. More tachycardic. Still altered/sleepy/lethargic. Wife reports he's able to drink no obvious dysphagia/aspiration but only drinks when prompted. Holding further opioids current moments r/t CHRISTIANE. Ammonia was high, will treat with lactulose. Getting ENT involved to evaluate persistent sinusitis.     Physical Exam  Constitutional: lethargic/sleeping, chronically ill appearing, awakens briefly to noxious stimuli (loud voice) but difficulty staying awake, falls asleep quickly with a snore/mouth open style breathing.   Eyes: PERRL, EOMI, sclera clear, conjunctiva normal.  ENT: very dry mouth, thick secretions, no blood/open lesions. No nasal drainage noted.   Respiratory: Non-labored breathing, good air exchange, but diffuse crackles in R base. No cough on exam.   Cardiovascular: RRR, no murmur noted.  GI: + bowel sounds, soft, non-distended, hard to determine if tender to palpation, says not but moans & groans with deep palpation.  Skin: No concerning lesions or rash on exposed  areas.  Musculoskeletal: No edema kev LEs.  Neurologic: lethargic/somnolent. Able to follow commands with repeated stimuli, weak B/L U & L extremities. Unable to assess gait. Hypereflexic with clonus.   Psych: flat.    Rounding:  Temp:  [96.5  F (35.8  C)-100.7  F (38.2  C)] 100.7  F (38.2  C)  Pulse:  [103-122] 121  Resp:  [15-18] 15  BP: (139-146)/(81-89) 140/81  SpO2:  [95 %-96 %] 96 %  ----------------------------------  I/O last 3 completed shifts:  In: 1880 [P.O.:360; I.V.:1520]  Out: 1475 [Urine:1175; Other:300]  ----------------------------------  There were no vitals filed for this visit.  ----------------------------------  Recent Labs   Lab Test  01/03/18   0358  01/02/18   1625   WBC  2.3*  Duplicate request  1.6*   HGB  7.7*  Duplicate request  8.1*   PLT  40*  Duplicate request  35*   NA  141  138   POTASSIUM  5.0  5.0   CHLORIDE  113*  110*   CO2  15*  18*   ANIONGAP  13  10   BUN  30  29   CR  4.73*  4.38*   PARADISE  9.8  10.0   MAG  1.6  1.5*   PHOS  7.1*  7.0*   LDH   --   464*   URIC  7.6*  7.1   BILITOTAL   --   0.6   ALKPHOS   --   131   ALT   --   12   AST   --   30   ALBUMIN   --   1.6*       ----------------------------------  Recent Results (from the past 24 hour(s))   CT Head w/o Contrast    Narrative    CT HEAD W/O CONTRAST 1/2/2018 6:45 PM    Provided History: altered mental status, very lethargic/out of it,  please NO contrast r/t CHRISTIANE;     Comparison: None available.    Technique: Using multidetector thin collimation helical acquisition  technique, axial, coronal and sagittal CT images from the skull base  to the vertex were obtained without intravenous contrast.     Findings:    No intracranial hemorrhage, mass effect, or midline shift. The  ventricles are proportionate to the cerebral sulci. The gray to white  matter differentiation of the cerebral hemispheres is preserved. The  basal cisterns are patent. Mild nonspecific regions of hypoattenuation  in the subcortical white matter of  both cerebral hemispheres.    Layering fluid/mucosal thickening within the bilateral maxillary  sinuses and sphenoid sinus. Mild dependent left mastoid effusion.  Right mastoid air cells are clear. Innumerable small lytic calvarial  and skull base lesions.       Impression    Impression:   1. No acute intracranial pathology.  2. Mild chronic small vessel ischemic disease.  3. Layering paranasal sinus fluid and mucosal thickening, which can be  seen in the setting of acute sinusitis.  4. Innumerable lytic lesions consistent with history of multiple  myeloma.    I have personally reviewed the examination and initial interpretation  and I agree with the findings.    PRESLEY CHEN MD   US Renal Complete    Narrative    EXAMINATION: Bilateral renal ultrasound, 1/3/2018 8:55 AM     COMPARISON: CT 6/19/2017    HISTORY: Acute kidney injury    FINDINGS:    Right kidney: Measures 10.9 cm in length. Parenchyma is of normal  thickness and echogenicity. No focal mass. No hydronephrosis.    Left kidney: Measures 12.9 cm in length. Parenchyma is of normal  thickness and echogenicity. No focal mass. No hydronephrosis.     Bladder: Unremarkable.      Impression    IMPRESSION:  1.  No hydronephrosis.    HENRY MOLINA MD     -----------------------------    sodium chloride (PF)  20 mL Intracatheter Q8H     heparin lock flush  5-10 mL Intracatheter Q24H     heparin  5 mL Intracatheter Q28 Days     meropenem  500 mg Intravenous Q12H     lactulose  10 g Oral TID     citalopram (celeXA) tablet 40 mg  40 mg Oral Daily     pantoprazole  40 mg Oral BID AC       dextrose 5% and 0.45% NaCl       - MEDICATION INSTRUCTIONS -       glucose **OR** dextrose **OR** glucagon, sodium chloride (PF), heparin lock flush, sodium chloride (PF), lactulose **AND** lactulose, - MEDICATION INSTRUCTIONS -, prochlorperazine **OR** prochlorperazine, ondansetron **OR** ondansetron **OR** [DISCONTINUED] ondansetron, acetaminophen, naloxone          I have  seen, interviewed, and examined the patient independently.  I have reviewed the vital signs and labs.  This note reflects my assessment and plan.    51 yo male with Rel/ref MM IgA s/p auto 2014, multiple regiens of chemo, 11/2017 Betty/Pom/Dex with rapid pancytopenia. Chronic, multiple Fxs,     Now adm here with new ARF with Cr to 4.7 (B/L < 1.0) for work up. Renal U/S (uninformative) and renal consult in progress. He did get a bit of IV contrast on 12/27. Also on Zosyn which can be nephrotoxic.     Will get SPEP/Light chains for staging. But not currently candidate for further therapy.    Sinusitis seen on CT. Appreciate ENT eval and sampling.    Flaca Guillen MD/PhD

## 2018-01-03 NOTE — PROGRESS NOTES
Sputum Induction attempted, pt tolerated well but unable to cough up a sputum sample. Lung sound clear in CATRACHITO, RUL and RML, with fine crackles in both bases.

## 2018-01-03 NOTE — PLAN OF CARE
Problem: Patient Care Overview  Goal: Plan of Care/Patient Progress Review  Pt seen by PT for evaluation.   He is able to ambulate with walker for 60' at min A.  Pt had not walked this far in about a week according to family.  He will benefit from short walks throughout the day after this transfustion. Pt is somewhat lethargic but arouseable for activity. Family supportive. They state that they have not been able to leave him home alone for a few weeks.    Discharge Planner PT   Patient plan for discharge: report that they have not had TCU in the past because he we declined due to chemo  Current status: Pt at [facility] for bed mobility and transfers, mild impulsivity. CGA with walker for short distance gait.   Barriers to return to prior living situation: Pt needing supervision/assist for all mobility. Fall risk, medical stability  Recommendations for discharge: TCU if able, home therapy to come out right after discharge if TCU not available  Rationale for recommendations: Pt will benefit from therapy to progress strength, endurance and safety when medically stable. He has gotten very weak       Entered by: Idalia Maurer 01/03/2018 1:43 PM

## 2018-01-03 NOTE — PLAN OF CARE
Problem: Patient Care Overview  Goal: Plan of Care/Patient Progress Review  Outcome: Improving  Low-grade fevers persist with Tmax 100.7 early this am. Acetaminophen x2. RBC's 1 unit transfused between febrile episodes and timed with acetamophen doses. Renal ultrasound done. Meropenum and lactulose started with stool results x3. He had one period of well-oriented lucidity mid-morning, but has been more withdrawn and lethargic the remainder of shift. Bed alarm employed. He will need blood cultures if he spikes again later this evening.

## 2018-01-04 ENCOUNTER — APPOINTMENT (OUTPATIENT)
Dept: CT IMAGING | Facility: CLINIC | Age: 53
DRG: 871 | End: 2018-01-04
Attending: NURSE PRACTITIONER
Payer: COMMERCIAL

## 2018-01-04 ENCOUNTER — APPOINTMENT (OUTPATIENT)
Dept: OCCUPATIONAL THERAPY | Facility: CLINIC | Age: 53
DRG: 871 | End: 2018-01-04
Attending: INTERNAL MEDICINE
Payer: COMMERCIAL

## 2018-01-04 LAB
ABO + RH BLD: ABNORMAL
ABO + RH BLD: ABNORMAL
AMMONIA PLAS-SCNC: 92 UMOL/L (ref 10–50)
ANION GAP SERPL CALCULATED.3IONS-SCNC: 13 MMOL/L (ref 3–14)
ANISOCYTOSIS BLD QL SMEAR: SLIGHT
BASOPHILS # BLD AUTO: 0.1 10E9/L (ref 0–0.2)
BASOPHILS NFR BLD AUTO: 3.6 %
BLD GP AB SCN SERPL QL: ABNORMAL
BLD PROD TYP BPU: ABNORMAL
BLD PROD TYP BPU: NORMAL
BLD UNIT ID BPU: 0
BLOOD BANK CMNT PATIENT-IMP: ABNORMAL
BLOOD BANK CMNT PATIENT-IMP: ABNORMAL
BLOOD PRODUCT CODE: NORMAL
BPU ID: NORMAL
BUN SERPL-MCNC: 27 MG/DL (ref 7–30)
CALCIUM SERPL-MCNC: 10.5 MG/DL (ref 8.5–10.1)
CHLORIDE SERPL-SCNC: 110 MMOL/L (ref 94–109)
CO2 SERPL-SCNC: 16 MMOL/L (ref 20–32)
CREAT SERPL-MCNC: 4.78 MG/DL (ref 0.66–1.25)
DAT POLY-SP REAG RBC QL: ABNORMAL
DIFFERENTIAL METHOD BLD: ABNORMAL
EOSINOPHIL # BLD AUTO: 0.1 10E9/L (ref 0–0.7)
EOSINOPHIL NFR BLD AUTO: 3.6 %
ERYTHROCYTE [DISTWIDTH] IN BLOOD BY AUTOMATED COUNT: 17.8 % (ref 10–15)
GFR SERPL CREATININE-BSD FRML MDRD: 13 ML/MIN/1.7M2
GLUCOSE BLDC GLUCOMTR-MCNC: 82 MG/DL (ref 70–99)
GLUCOSE BLDC GLUCOMTR-MCNC: 90 MG/DL (ref 70–99)
GLUCOSE SERPL-MCNC: 98 MG/DL (ref 70–99)
HCT VFR BLD AUTO: 25.4 % (ref 40–53)
HGB BLD-MCNC: 7.8 G/DL (ref 13.3–17.7)
LACTATE BLD-SCNC: 1.1 MMOL/L (ref 0.7–2)
LYMPHOCYTES # BLD AUTO: 0.2 10E9/L (ref 0.8–5.3)
LYMPHOCYTES NFR BLD AUTO: 6.3 %
MAGNESIUM SERPL-MCNC: 1.4 MG/DL (ref 1.6–2.3)
MCH RBC QN AUTO: 28.7 PG (ref 26.5–33)
MCHC RBC AUTO-ENTMCNC: 30.7 G/DL (ref 31.5–36.5)
MCV RBC AUTO: 93 FL (ref 78–100)
METAMYELOCYTES # BLD: 0.1 10E9/L
METAMYELOCYTES NFR BLD MANUAL: 2.7 %
MONOCYTES # BLD AUTO: 0.3 10E9/L (ref 0–1.3)
MONOCYTES NFR BLD AUTO: 11.6 %
MYELOCYTES # BLD: 0.2 10E9/L
MYELOCYTES NFR BLD MANUAL: 6.3 %
NEUTROPHILS # BLD AUTO: 1.9 10E9/L (ref 1.6–8.3)
NEUTROPHILS NFR BLD AUTO: 65.9 %
NUM BPU REQUESTED: 2
PHOSPHATE SERPL-MCNC: 5.3 MG/DL (ref 2.5–4.5)
PLATELET # BLD AUTO: 45 10E9/L (ref 150–450)
POTASSIUM SERPL-SCNC: 4 MMOL/L (ref 3.4–5.3)
RBC # BLD AUTO: 2.72 10E12/L (ref 4.4–5.9)
SODIUM SERPL-SCNC: 139 MMOL/L (ref 133–144)
SPECIMEN EXP DATE BLD: ABNORMAL
TRANSFUSION STATUS PATIENT QL: NORMAL
TRANSFUSION STATUS PATIENT QL: NORMAL
URATE SERPL-MCNC: 6.8 MG/DL (ref 3.5–7.2)
WBC # BLD AUTO: 2.9 10E9/L (ref 4–11)

## 2018-01-04 PROCEDURE — 36415 COLL VENOUS BLD VENIPUNCTURE: CPT | Performed by: PHYSICIAN ASSISTANT

## 2018-01-04 PROCEDURE — 87529 HSV DNA AMP PROBE: CPT | Performed by: NURSE PRACTITIONER

## 2018-01-04 PROCEDURE — 83605 ASSAY OF LACTIC ACID: CPT | Performed by: PHYSICIAN ASSISTANT

## 2018-01-04 PROCEDURE — 97535 SELF CARE MNGMENT TRAINING: CPT | Mod: GO | Performed by: OCCUPATIONAL THERAPIST

## 2018-01-04 PROCEDURE — 36592 COLLECT BLOOD FROM PICC: CPT | Performed by: NURSE PRACTITIONER

## 2018-01-04 PROCEDURE — 85025 COMPLETE CBC W/AUTO DIFF WBC: CPT | Performed by: NURSE PRACTITIONER

## 2018-01-04 PROCEDURE — 82140 ASSAY OF AMMONIA: CPT | Performed by: NURSE PRACTITIONER

## 2018-01-04 PROCEDURE — 00000402 ZZHCL STATISTIC TOTAL PROTEIN: Performed by: NURSE PRACTITIONER

## 2018-01-04 PROCEDURE — 25000132 ZZH RX MED GY IP 250 OP 250 PS 637: Performed by: NURSE PRACTITIONER

## 2018-01-04 PROCEDURE — 40000133 ZZH STATISTIC OT WARD VISIT: Performed by: OCCUPATIONAL THERAPIST

## 2018-01-04 PROCEDURE — 84550 ASSAY OF BLOOD/URIC ACID: CPT | Performed by: NURSE PRACTITIONER

## 2018-01-04 PROCEDURE — 25800025 ZZH RX 258: Performed by: NURSE PRACTITIONER

## 2018-01-04 PROCEDURE — 80048 BASIC METABOLIC PNL TOTAL CA: CPT | Performed by: NURSE PRACTITIONER

## 2018-01-04 PROCEDURE — 83735 ASSAY OF MAGNESIUM: CPT | Performed by: NURSE PRACTITIONER

## 2018-01-04 PROCEDURE — 25000125 ZZHC RX 250: Performed by: NURSE PRACTITIONER

## 2018-01-04 PROCEDURE — 00000146 ZZHCL STATISTIC GLUCOSE BY METER IP

## 2018-01-04 PROCEDURE — 36592 COLLECT BLOOD FROM PICC: CPT | Performed by: PHYSICIAN ASSISTANT

## 2018-01-04 PROCEDURE — 87040 BLOOD CULTURE FOR BACTERIA: CPT | Performed by: PHYSICIAN ASSISTANT

## 2018-01-04 PROCEDURE — 84165 PROTEIN E-PHORESIS SERUM: CPT | Performed by: NURSE PRACTITIONER

## 2018-01-04 PROCEDURE — 71250 CT THORAX DX C-: CPT

## 2018-01-04 PROCEDURE — 84100 ASSAY OF PHOSPHORUS: CPT | Performed by: NURSE PRACTITIONER

## 2018-01-04 PROCEDURE — 25000128 H RX IP 250 OP 636: Performed by: INTERNAL MEDICINE

## 2018-01-04 PROCEDURE — 99233 SBSQ HOSP IP/OBS HIGH 50: CPT | Performed by: INTERNAL MEDICINE

## 2018-01-04 PROCEDURE — 12000008 ZZH R&B INTERMEDIATE UMMC

## 2018-01-04 PROCEDURE — P9040 RBC LEUKOREDUCED IRRADIATED: HCPCS | Performed by: INTERNAL MEDICINE

## 2018-01-04 RX ORDER — VALACYCLOVIR HYDROCHLORIDE 500 MG/1
1000 TABLET, FILM COATED ORAL EVERY 24 HOURS
Status: DISCONTINUED | OUTPATIENT
Start: 2018-01-04 | End: 2018-01-07

## 2018-01-04 RX ADMIN — ACETAMINOPHEN 650 MG: 325 TABLET ORAL at 22:11

## 2018-01-04 RX ADMIN — ACETAMINOPHEN 650 MG: 325 TABLET ORAL at 14:03

## 2018-01-04 RX ADMIN — ONDANSETRON 8 MG: 8 TABLET, ORALLY DISINTEGRATING ORAL at 08:58

## 2018-01-04 RX ADMIN — MEROPENEM 500 MG: 500 INJECTION, POWDER, FOR SOLUTION INTRAVENOUS at 10:38

## 2018-01-04 RX ADMIN — DEXTROSE AND SODIUM CHLORIDE: 5; 450 INJECTION, SOLUTION INTRAVENOUS at 22:08

## 2018-01-04 RX ADMIN — VALACYCLOVIR HYDROCHLORIDE 1000 MG: 500 TABLET, FILM COATED ORAL at 15:59

## 2018-01-04 RX ADMIN — DEXTROSE AND SODIUM CHLORIDE: 5; 450 INJECTION, SOLUTION INTRAVENOUS at 08:38

## 2018-01-04 RX ADMIN — PANTOPRAZOLE SODIUM 40 MG: 40 TABLET, DELAYED RELEASE ORAL at 15:59

## 2018-01-04 RX ADMIN — PANTOPRAZOLE SODIUM 40 MG: 40 TABLET, DELAYED RELEASE ORAL at 08:42

## 2018-01-04 RX ADMIN — CITALOPRAM HYDROBROMIDE 40 MG: 20 TABLET ORAL at 08:42

## 2018-01-04 RX ADMIN — LACTULOSE 10 G: 20 SOLUTION ORAL at 08:42

## 2018-01-04 ASSESSMENT — PAIN DESCRIPTION - DESCRIPTORS
DESCRIPTORS: ACHING
DESCRIPTORS: ACHING

## 2018-01-04 NOTE — PROGRESS NOTES
"  Nephrology Progress Note  01/03/2018      Jeffrey Real is a 52 year old male with known relapsed MM on chemo, recent hospital admission for sepsis, discharged to home on 12/30 on a Zpack and readmitted on 1/1/18. Transferred from OSH to [facility] on 1/2/18 for AMS, electrolyte abnormalities and CHRISTIANE.      ASSESSMENT AND RECOMMENDATIONS:      1. CHRISTIANE - No improvement in renal function. Patient is not pre renal given lack of improvement with IVF. He has no hydro on Renal US and kidneys appear normal. However, given the combination of high protein from Myeloma, hypercalcemia and contrast exposure likely has precipitated in his tubules causing obstruction/ATN. Michel 109 and Fena 16.1 suggesting ATN.    He does have h/o CHRISTIANE requiring RRT in Feb 2017. He did have a mild CHRISTIANE in Nov with peak creat of 1.6 on 11/25. Unfortunately did not have another creat drawn following the contrast on 12/27/17.   - No indication for RRT at this time, but will be monitoring closely   - Not really much we can do at this point. Could give one dose of Lasix 80 mg IV to see if the combination of IVF + Lasix would \"push\" some of the crystals out.    - Wife and patient agreeable to RRT if renal function deteriorates   - Continue to monitor renal function for recovery with daily chemistry labs   - Avoid nephrotoxins, hypotension   - Renal dose medications     2. Volume status - He appears euvolemic. No edema, hypoxia. Blood pressure is 130-140. UO 1050 cc over last 24 hrs + some incontinent events. Weight today is 80 kg   - On IVF   - Chan for accurate I/O. He has episodes of incontinence   - Standing daily weights   - Strict I/O     3. Electrolytes - No acute concerns. K 5.0, Na 141     4. Acid base - Bicarb 15. Etiology likely metabolic acidosis 2/2 CHRISTIANE, but blood gasses would confirm.    - No need for intervention at this time but will monitor closely     5. BMD - Hypercalemia is chronic, however, more elevated for several days prior to admission. "  Dehydration may have been making this worse given improvement with rehydration. Ica today is 5.1. He was taking Ca/D at home, but denies additional Ca. Has known h/o lytic lesions.    - Hypercalcemia can cause CHRISTIANE so will have to monitor closely   - Phos 7.1. Etiology CHRISTIANE. From a renal standpoint does not need to be treated.      6. Anemia - Hgb 7.7 following rehydration. Has known pancytopenia 2/2 MM and chemo. Hgb goal is > 8. Getting RBC today   - Transfusions per Oncology     7. Chronic pain - Was taking scheduled Oxycontin/ Gabapentin and prn Dilaudid. All have been discontinued.    - May have some AMS given opiates in setting of CHRISTIANE   - HCT showing some chronic sinusitis, but no other acute pathology. His cognition is improved today   - Would monitor for withdrawal.      8. Multiple Myeloma - Per primary team. No h/o Myeloma kidney. Total protein 10.7. Has been in the 10-11 range since 9/17. Albumin however is much lower than usual. Bili and enzymes normal. LD is 464.      9. HTN - B/P 130-140/, HR teens - 120. PTA meds: Diltiazem 60 mg bid.     10. Sepsis? - Having fevers to 100.7. PCT 0.8. On Meropenem. Blood, urine, sinus cxs NTD. W/U in progress.      Recommendations were communicated to primary team directly     Humaira Crisostomo, NP   [phone]    Seen and discussed with Dr. Ayala    Interval History :     No improvement in creat despite IVF  Non oliguric  Appears to have sepsis from undetermined source  Interval progress notes, imaging studies and labs reviewed    Review of Systems:   I reviewed the following systems:  GI: Eating some but not much. Family offering food/liquid  Neuro:  Confusion is improved today  Constitutional:  Having fevers  CV: Denies dyspnea, CP or edema.  : Voiding w/o martinez, intermit incontinent    Physical Exam:   I/O last 3 completed shifts:  In: 3201.67 [P.O.:900; I.V.:2001.67]  Out: 2450 [Urine:1650; Other:800]   /85 (BP Location: Right arm)  Pulse 109  Temp 97.1  " F (36.2  C) (Axillary)  Resp 20  Ht 1.778 m (5' 10\")  Wt 80 kg (176 lb 5.9 oz)  SpO2 98%  BMI 25.31 kg/m2     GENERAL APPEARANCE: Alert, interactive. Family present  EYES: no scleral icterus, pupils equal  Pulmonary: lungs clear to auscultation. Breathing is non labored. Not on supplemental oxygen.   CV: tachy   - Edema - none  GI: soft, nontender, non distended  MS: no evidence of inflammation in joints, no muscle tenderness  : voiding w/o martinez  SKIN: no rash, warm, dry, no cyanosis  NEURO: Improved. Oriented to city, month, year and family.     Labs:   All labs reviewed by me  Electrolytes/Renal -   Recent Labs   Lab Test  01/03/18   0358  01/02/18   1625   NA  141  138   POTASSIUM  5.0  5.0   CHLORIDE  113*  110*   CO2  15*  18*   BUN  30  29   CR  4.73*  4.38*   GLC  47*  52*   PARADISE  9.8  10.0   MAG  1.6  1.5*   PHOS  7.1*  7.0*       CBC -   Recent Labs   Lab Test  01/03/18   0358  01/02/18   1625   WBC  2.3*  Duplicate request  1.6*   HGB  7.7*  Duplicate request  8.1*   PLT  40*  Duplicate request  35*       LFTs -   Recent Labs   Lab Test  01/02/18   1625   ALKPHOS  131   BILITOTAL  0.6   ALT  12   AST  30   PROTTOTAL  10.7*   ALBUMIN  1.6*       Iron Panel - No lab results found.      Imaging:  EXAMINATION: Bilateral renal ultrasound, 1/3/2018 8:55 AM      COMPARISON: CT 6/19/2017     HISTORY: Acute kidney injury     FINDINGS:     Right kidney: Measures 10.9 cm in length. Parenchyma is of normal  thickness and echogenicity. No focal mass. No hydronephrosis.     Left kidney: Measures 12.9 cm in length. Parenchyma is of normal  thickness and echogenicity. No focal mass. No hydronephrosis.      Bladder: Unremarkable.         IMPRESSION:  1.  No hydronephrosis    Current Medications:    sodium chloride (PF)  20 mL Intracatheter Q8H     heparin lock flush  5-10 mL Intracatheter Q24H     heparin  5 mL Intracatheter Q28 Days     meropenem  500 mg Intravenous Q12H     lactulose  10 g Oral TID     " citalopram (celeXA) tablet 40 mg  40 mg Oral Daily     pantoprazole  40 mg Oral BID AC       dextrose 5% and 0.45% NaCl 100 mL/hr at 01/03/18 1010     - MEDICATION INSTRUCTIONS -       Humaira Crisostomo, NP

## 2018-01-04 NOTE — PROGRESS NOTES
Care Coordinator Progress Note     Admission Date/Time:  1/2/2018  Attending MD:  Flaca Guillen MD  Hematologist: Dr. Torre/[facility]     Data  Chart reviewed, discussed with interdisciplinary team.   Patient was admitted for: Acute Kidney Injury  Patient has a history of Multiple Myeloma    Concerns with insurance coverage for discharge needs: None.  Current Living Situation: Patient lives with spouse.  Support System: Supportive and Involved  Services Involved: TBD  Transportation: Family or Friend will provide  Barriers to Discharge: Medical Stability    Coordination of Care and Referrals: Current recommendation is TCU; SW aware. Writer will continue to follow and assist as needed.     Assessment  RNCC participated in bedside rounds. Introduced the role of RNCC to patient and present family members.     Plan  Anticipated Discharge Date:  1/6/2018  Anticipated Discharge Plan:  TCU with clinic follow up    ROHITH Gee  Phone [phone]  Pager [phone]

## 2018-01-04 NOTE — PROGRESS NOTES
01/03/18 1200   Quick Adds   Type of Visit Initial PT Evaluation       Present no   Language English   Living Environment   Lives With spouse   Living Arrangements house   Home Accessibility no concerns   Number of Stairs to Enter Home 0   Number of Stairs Within Home 0   Transportation Available family or friend will provide   Self-Care   Usual Activity Tolerance moderate   Current Activity Tolerance poor   Regular Exercise no   Equipment Currently Used at Home cane, straight;walker, rolling  (built in shower bench)   Activity/Exercise/Self-Care Comment Pt hasn't been getting out of bed for the last few days. He has a back back so gets help with lower body. Progressed off walker to cane   Functional Level Prior   Ambulation 1-->assistive equipment   Transferring 0-->independent   Toileting 0-->independent   Bathing 3-->assistive equipment and person  (wife helped with legs)   Dressing 2-->assistive person   Eating 0-->independent   Communication 0-->understands/communicates without difficulty   Swallowing 0-->swallows foods/liquids without difficulty   Cognition 0 - no cognition issues reported   Fall history within last six months yes   Number of times patient has fallen within last six months 1  (in July)   Which of the above functional risks had a recent onset or change? ambulation;transferring;toileting   General Information   Onset of Illness/Injury or Date of Surgery - Date 01/02/18   Referring Physician Dr. Luis Fernando Levy   Patient/Family Goals Statement Daughter reports that pt is fragile, so be careful with him. Family and patient want to see him return to functional gait with cane. They are open to rehab but in the past it has not been an option secondary to chemo.   Pertinent History of Current Problem (include personal factors and/or comorbidities that impact the POC) admitted from OSH r/t CHRISTIANE on CKD, persistent sinusitis, HCAP, concern for sepsis, & altered mental status. He has  history of IgA multiple myeloma and with autologus stem cell transplant in 8/2014   Precautions/Limitations fall precautions;spinal precautions  (doreen mets, relative spinal precautions)   General Observations Pt with Hgb of 7.7 and platlets 40. He is getting 1unit of PRBC during evaluation but RN asked that he be evaluated today as he is up to bathroom frequently due to lactulouse   General Info Comments Supportive wife and daughter present.   Cognitive Status Examination   Orientation place;person   Level of Consciousness lethargic/somnolent   Follows Commands and Answers Questions 75% of the time   Personal Safety and Judgment intact   Memory impaired   Cognitive Comment Wife and daughter present   Integumentary/Edema   Integumentary/Edema no deficits were identifed   Posture    Posture Forward head position;Kyphosis   Range of Motion (ROM)   ROM Comment Not formally assessed. Pt with compression fx of T-spine per spouse, so spinal ROM not assessed. LE functional   Strength   Strength Comments Decreased, use of hands to stand   Bed Mobility   Bed Mobility Comments independent   Transfer Skills   Transfer Comments CGA to stand to WW   Gait   Gait Comments min A for 60' with WW, decreased step clearance   Balance   Balance Comments Pt stands with single UE support to don robe, then doffs without UE support and maintains static stance.  Pt uses B UE support for mobility   Sensory Examination   Sensory Perception Comments Pt with neuropathy, able to feel light touch however   Coordination   Coordination Comments Grossly intact   General Therapy Interventions   Planned Therapy Interventions balance training;gait training;strengthening;transfer training;home program guidelines   Clinical Impression   Criteria for Skilled Therapeutic Intervention yes, treatment indicated   PT Diagnosis impaired mobility due to deconditioning and illness   Influenced by the following impairments pain, lethergy, decreased arousal,  "decreased strength, decreased endurance   Functional limitations due to impairments activity tolerance, gait safety and tolerance, stairs, transfers   Clinical Presentation Evolving/Changing   Clinical Presentation Rationale Pt recently discharged from OSH and now back, chronic illness, cognitive deficits   Clinical Decision Making (Complexity) Moderate complexity   Therapy Frequency` 5 times/week   Predicted Duration of Therapy Intervention (days/wks) 1 week   Anticipated Discharge Disposition Home with Assist;Home with Home Therapy;Transitional Care Facility   Risk & Benefits of therapy have been explained Yes   Patient, Family & other staff in agreement with plan of care Yes   Clinical Impression Comments Pt would benefit from TCU if able to be accepted while on chemo. Last time pt discharged home, it took 2 weeks before home PT could even come out and patient progressed to not have a need, but did not follow up with OP or cancer rehab   [facility] TM \"6 Clicks\"   2016, Trustees of [facility], under license to [employer].  All rights reserved.   6 Clicks Short Forms Basic Mobility Inpatient Short Form   [facility]  \"6 Clicks\" V.2 Basic Mobility Inpatient Short Form   1. Turning from your back to your side while in a flat bed without using bedrails? 4 - None   2. Moving from lying on your back to sitting on the side of a flat bed without using bedrails? 4 - None   3. Moving to and from a bed to a chair (including a wheelchair)? 3 - A Little   4. Standing up from a chair using your arms (e.g., wheelchair, or bedside chair)? 3 - A Little   5. To walk in hospital room? 3 - A Little   6. Climbing 3-5 steps with a railing? 3 - A Little   Basic Mobility Raw Score (Score out of 24.Lower scores equate to lower levels of function) 20   Total Evaluation Time   Total Evaluation Time (Minutes) 20     "

## 2018-01-04 NOTE — PLAN OF CARE
Problem: Patient Care Overview  Goal: Plan of Care/Patient Progress Review  Outcome: Improving  Mentation clearing and activity improving. Lactulose stopped per orders, but he continues to have frequent loose stools which makes urine measurement difficult. Creatine continues to be compromised at 4.78 today. Bed alarm continues. RBC's transfusing. Telemetry shows normal sinus rhythm with mild tachycardia.

## 2018-01-04 NOTE — PLAN OF CARE
Problem: Patient Care Overview  Goal: Plan of Care/Patient Progress Review  Outcome: No Change  BPmax 160/93. Has hx HTN but meds currently held. Went WNL by its self; MD paged to make aware. AOVSS. Lethargic throughout shift but arousable. Disoriented to situation and time. Becoming more alert as shift progressed. Voiding adequately; no incontinence. Tele in Sinus Tach. Continue lactulose d/t elevated ammonia; no level drawn today. Room service ordered per nutrition and snacks and supplements. Hypoglycemic in am. Per MD note, BG checks q 4hrs (no orders). BS stable at 115 at 1600 and 2000. Continue to monitor over night. Continue with POC.

## 2018-01-04 NOTE — CONSULTS
[facility]  Transplant Infectious Disease Consult Note - New Patient     Patient:  Jeffrey Real, Date of birth 1965, Medical record number [id number]  Date of Visit:  01/04/2018  Consult requested by Dr. Guillen for evaluation of pneumonia and sinusitis.    Assessment and Recommendations:   Recommendations:  - Discontinue Meropenem given low suspicion for an active bacterial process  - CMV, HHV6, Adenovirus, HSV 1/2 PCR from blood  - Cryptococcal ag in blood  - Check IGG level  - HSV 1/2 PCR swab of oral lesion  - Start empirical HSV treatment with Acyclovir  - agree w/ repeat LFTs planned per primary team.     Please page with any questions or concerns.    Assessment: 51yo man with relapsed IgA kappa multiple myeloma (dx 2013 s/p autologous transplant 8/2014) most recently treated with daratumumab/pomalidomide/dexamethasone (cycle 1 in 11/2017) who was transferred from [facility] to [facility] on 1/2/18 for higher level care in the setting of CHRISTIANE on CKD, sinusitis, pneumonia and altered mental status.     Infectious Disease Issues:  1) Non-neutropenic Fever w/ CT findings of sinusitis and possible PNA - He and his family give a good story of pneumonia during the time of his second outside hospitalization with improvement in symptoms with treatment. At present he does not have a cough, sputum production, shortness of breath or hypoxemia. His CT chest findings likely represent residual inflammation after his recent PNA. He also does not have symptoms of acute bacterial sinusitis and nasal endoscopy was largely unremarkable. For these reasons, would discontinue antibiotics and observe off therapy. As he does have low grade temperatures and lymphopenia w/ hypogam from MM and therapy, would be more concerned for an underlying viral process. Recommend swabbing his oral lesion for HSV and starting empirical Acyclovir treatment. Would also check blood for HSV, CMV, HHV6, Adenovirus and  repeat an IGG level. Other considerations would be fever from underlying MM.     2) AMS - He presented to [facility] with his family on 1/1/18 with AMS, found to have CHRISTIANE, possible ELOY/ATN. The AMS is felt likely d/t medications side effects in the setting of renal failure and has improved with holding of meds. He does not have headache or meningismus to suspect a CNS infection. For completeness, would check a serum cryptococcal ag.     Other Infectious Disease issues include:  - Viral serostatus: CMV-, HSV 1+/2-, VZV+  - Immunization status: due for PCV13  - Gamma globulin status:  on 12/7/17  - Isolation status: Good hand hygiene.    Katarzyna Youngblood DO  Infectious Diseases Fellow  p: [phone]    Attestation:  I have reviewed today's vital signs, medications, labs and imaging.      Floor time: 45 minutes, Face-to-face time: 15 minutes, Total time: 60 minutes  Alex Cheatham,   Pager [phone]  Jeffrey Real was seen in the hospital by Alex Cheatham on Jan 4th with Dr. Youngblood.  I reviewed the history & exam. Assessment and plan were jointly made.  I agree with and have edited the fellow's note and plan of care.  Febrile illness w/ underlying immunologic deficits w/ hypogam and T cell dysfunction from MM and therapy.  No longer neutropenic and clinical / radiographic findings not consistent with acute pneumonia or bacterial process.  He did have a pneumonia that seems to be adequately treated, now no cough / sob or hypoxemia.  Above recommendations for w/up for current low grade fevers.  Will monitor off abx's and evaluate for possible viral processes. Alex Cheatham MD.           History of Infectious Disease Illness:     Mr. Real is a 53yo man with relapsed IgA kappa multiple myeloma (dx 2013 s/p autologous transplant 8/2014) most recently treated with daratumumab/pomalidomide/dexamethasone (cycle 1 in 11/2017) who was transferred from [facility] to [facility] on 1/2/18 for higher  level care in the setting of CHRISTIANE on CKD, sinusitis, pneumonia and altered mental status. The patient has had 3 admission in the past month at [facility]: 12/11-12/21 with neutropenic fever, 12/25-12/30 for neutropenic fever, pneumonia, sinusitis after which he was discharged to home on Augmentin BID x5 days, Azithromycin 500mg daily x5 days, then again admitted 1/1-1/2 with progressive weakness. The patient is still confused and unable to give much history. No headache, vision changes, hallucinations, neck pain/stiffness, focal weakness. Per discussions with his wife, daughter and two friends, he had a productive cough and shortness of breath prior to his second admission at [facility]. The symptoms had significantly improved when discharged at that time and have not returned. Denies any facial pain at present, no sinus congestion, rhinorrhea, or pharyngeal pain. He does have an oral ulcer/abrasion on his inferior lip which has been present for 1 week and may be related to accidentally biting the area.     Since being admitted to [facility], Oxycodone, Dilaudid, Ativan and Gabapentin are being held for possible contribution to his AMS, which is now markedly improved. He was switched from Piperacillin-Tazobactam to Meropenem to cover for possible non-responding pneumonia and sinusitis. ENT was consulted and performed a nasal endoscopy and culture 1/3 with mild inflammation of the right nasal cavity without purulent drainage and no findings of necrosis or invasive fungal infection. Infectious Diseases is consulted to assist with management.    Transplants:  Auto-HCT 8/2014 for IgA kappa MM    Exposure History: Born in [place]. Lives in [place] with his wife, dog and cat. No international travel. 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    Review of Systems:  CONSTITUTIONAL:  No subjective fevers or chills  EYES: negative for icterus or acute vision changes  ENT:  negative for acute  hearing loss, sore throat, painful lip lesion as noted above  RESPIRATORY:  negative for cough, sputum or dyspnea  CARDIOVASCULAR:  negative for chest pain, palpitations  GASTROINTESTINAL:  negative for nausea, vomiting, diarrhea or constipation  GENITOURINARY:  negative for dysuria or hematuria  HEME:  No easy bruising or bleeding  INTEGUMENT:  negative for rash or pruritus  NEURO:  Negative for headache or tremor    No past medical history on file.  See above    No past surgical history on file.  Reviewed non contributory    No family history on file.  Reviewed non contributory    Social History     Social History Narrative     Social History   Substance Use Topics     Smoking status: Never Smoker     Smokeless tobacco: Not on file     Alcohol use Not on file         There is no immunization history on file for this patient.    Patient Active Problem List   Diagnosis     Multiple myeloma in relapse (H)     Multiple myeloma (H)            Current Medications & Allergies:       valACYclovir  1,000 mg Oral Q24H     sodium chloride (PF)  20 mL Intracatheter Q8H     heparin lock flush  5-10 mL Intracatheter Q24H     heparin  5 mL Intracatheter Q28 Days     citalopram (celeXA) tablet 40 mg  40 mg Oral Daily     pantoprazole  40 mg Oral BID AC       Infusions/Drips:    dextrose 5% and 0.45% NaCl 100 mL/hr at 01/04/18 0838     - MEDICATION INSTRUCTIONS -         Allergies   Allergen Reactions     Blood-Group Specific Substance      Other reaction(s): Other - Describe In Comment Field  Patient has a Nonspecific antibody. Blood Product orders may be delayed.  Draw one red top and two purple top tubes for ALL Type and Screen/ Type and Crossmatch orders.     Chlorhexidine      Other reaction(s): Irritation At Patch Site  Itching with Prevantic Swabs     Levofloxacin Rash     Stopped levaquin and rash resolved by 8/8/14            Physical Exam:     Ranges for vital signs:  Temp:  [97.8  F (36.6  C)-100.6  F (38.1  C)] 97.8  F  (36.6  C)  Pulse:  [108-111] 108  Heart Rate:  [100-113] 100  Resp:  [16-18] 16  BP: (140-156)/(80-86) 141/81  SpO2:  [94 %-98 %] 98 %  Vitals:    01/03/18 0947 01/04/18 0700   Weight: 80 kg (176 lb 5.9 oz) 78.9 kg (174 lb)     Physical Examination:  GENERAL:  Well-developed, well-nourished, in bed in no acute distress.  Lying flat, comfortable on room air.   HEAD:  Head is normocephalic, atraumatic.  EYES:  Eyes have anicteric sclerae without conjunctival injection.  ENT:  Oropharynx is moist, right sided ulceration on the mucosa of his lower lip.  NECK:  Supple. No cervical lymphadenopathy.  LUNGS:  Very faint crackles at right lung base.  CARDIOVASCULAR:  Regular rate and rhythm with no murmurs, gallops or rubs. ICD in place with no overlaying erythema.  ABDOMEN:  Normal bowel sounds, soft, nontender. No appreciable hepatosplenomegaly.  SKIN:  No acute rashes. R chest port without any surrounding erythema or exudate.  NEUROLOGIC:  Alert and oriented to person and place, slow to date. Memory impaired. Active x4 extremities.         Laboratory Data:       Metabolic Studies       Recent Labs   Lab Test  01/04/18 0429 01/03/18   0358  01/03/18   0342  01/02/18   1830   NA  139  141   --    --    POTASSIUM  4.0  5.0   --    --    CHLORIDE  110*  113*   --    --    CO2  16*  15*   --    --    ANIONGAP  13  13   --    --    BUN  27  30   --    --    CR  4.78*  4.73*   --    --    GFRESTIMATED  13*  13*   --    --    GLC  98  47*   --    --    PARADISE  10.5*  9.8   --    --    PHOS  5.3*  7.1*   --    --    MAG  1.4*  1.6   --    --    LACT  1.1   --   0.7   --    PCAL   --    --    --   0.80       Hepatic Studies    Recent Labs   Lab Test  01/02/18   1625   BILITOTAL  0.6   ALKPHOS  131   PROTTOTAL  10.7*   ALBUMIN  1.6*   AST  30   ALT  12   LDH  464*       Hematology Studies      Recent Labs   Lab Test  01/04/18   0429  01/03/18   0358  01/02/18   1625   WBC  2.9*  2.3*  Duplicate request  1.6*   ANEU  1.9  1.6  1.2*    ALYM  0.2*  0.3*  0.1*   PAM  0.3  0.4  0.2   AEOS  0.1  0.1  0.0   HGB  7.8*  7.7*  Duplicate request  8.1*   HCT  25.4*  24.5*  Duplicate request  26.5*   PLT  45*  40*  Duplicate request  35*       Clotting Studies    Recent Labs   Lab Test  01/02/18   1625   INR  1.38*   PTT  50*       Urine Studies     Recent Labs   Lab Test  01/02/18   1901   URINEPH  6.0   NITRITE  Negative   LEUKEST  Negative   WBCU  1       Microbiology:  Blood cultures  - Portacath 1/2: no growth  - Port & peripheral 1/4: no growth    Urine culture  - 1/2: no growth    Nasal culture  - 1/3: gram stain negative, bacterial and fungal culture in process    Galactomannan Ag - ordered  1,3 Beta D glucan - ordered    Virology:  Respiratory viral panel - ordered    Imaging:  CT Facial Bones w/ IV contrast (from OSH) 12/27/2017  Sinusitis most prominently maxillary and sphenoid sinuses. Facial bones appear intact.    CXR 2 Views (from OSH) 1/1/2018  Diminished lung volumes with bibasilar streaky and patchy opacities.  There is an increase in streaky opacity at the right lung base in the interim.  The previously noted patchy opacity in the right upper lung has resolved.    CT Head w/o Contrast 1/2/2018 Impression:   1. No acute intracranial pathology.  2. Mild chronic small vessel ischemic disease.  3. Layering paranasal sinus fluid and mucosal thickening, which can be  seen in the setting of acute sinusitis.  4. Innumerable lytic lesions consistent with history of multiple  myeloma.    US Renal 1/3/2018 No hydronephrosis.    CT Chest w/o Contrast 1/4/2018  1. Diffuse mosaic attenuation suggestive of small vessel or small airway disease.   2. Basilar predominant atelectasis without definite consolidation. Please note however that this study is suboptimal due to substantial respiratory motion.  3. Central bronchial wall thickening consistent with infectious or inflammatory bronchitis.  4. Numerous subacute to chronic pathologic rib fractures  and compression deformities of the thoracic spine associated lytic lesions consistent with known multiple myeloma.

## 2018-01-04 NOTE — PROGRESS NOTES
Hematology / Oncology Progress Note   Date of Service: 01/04/2018     Addendum: Got updates from ID. Aren't impressed for bacterial infection. Would like to send blood viruses instead (HSV 1&2 PCR (from blood & oral swab), Cryptococcus, Adenovirus, HHV6, CMV PCR (won't let me send b/c someone send IgG)). Also IgG level. RVP. Would like to empirically with Valtrex.     Assessment & Plan Jeffrey Real is a 51 y/o M with complex PMH including refractory IgA Multiple Myeloma s/p autologous transplant 08/2014, most recently on Daralex/Pom/Decadron admitted from OSH r/t CHRISTIANE on CKD, persistent sinusitis, HCAP, concern for sepsis, & altered mental status.     # Altered mental status. Very lethargic/sleepy on exam. Hyperreflexic. Clonus. Likely multifactorial (kidney injury, long acting pain medications, hypercalcemia, infectious-sinusitis/PNA).  - Nephrology consult, ENT consult.   - Check ionized calcium 5.1 treat with fluids, Ammonia 70 treat with lactulose.   - Head CT w/o contrast shows no evidence of acute intracranial pathology but + sinusitis & mild chronic small vessel ischemic disease.  - Hold oxycodone, dilaudid, ativan & gabapentin r/t sedating effects/CHRISTIANE, not concerned about withdrawal symptoms at this time.   - Blood culture NTD, lactic acid 0.7, procalcitonin 0.8.   - Repeat UA negative for LE, negative for N, 1 WBC, few bacteria, culture is pending.   - Mildred said hyperviscosity unlikely as IgA. Per Yelitza note: Most recently, his M-spike has been increasing; in fact, as of 11/13/2017 his gamma peak was 6.24gm%.  He had three monoclonal peaks of 2.6, 2.0 and 1.0gm%, IgA 5210.  His kappa free light chains in the serum were 216, lambda 0.11, with a kappa/lambda ratio of 1963.64.    - Improving today.    # Hypoglycemia. Likely r/t poor PO intake above.   - D51/2 NS at 100 ml/hr.  - Encourage eating.      # High Risk/Resistant Multiple Myeloma IgA kappa. S/P autologous peripheral blood stem cell  transplant in 08/2014.   # Multiple lytic lesions 2/2 MM (ribs, T7, T9, T11, T12) s/p XRT to thoracolumbar spine.  # Fracture L ischium.  # Hypogammaglobulinemia s/p IVIG replacement.  S/P multiple lines of therapy including auto tx (RVD, auto+melphalan, Velcade Maintenance, VDT-PACE, followed by PCD) complex cytogenetics (17 p deletion, p53 deletion) most recently on pomalidomide/carfilzomib & dex 20+ cycles (started 02/2017) with progression in November 2017 was then switched to daratumumab/pomalidomide & dex.  - Plan was to try to get M-spike < 1 gram% then pursue Allogeneic transplant (saw Yelitza SANDY in clinic 11/27/17). Has brother as haplo-identical donor.  - 12/26/17 found to have pathologic fractures of T2 spinous process & bilateral T1 transverse process. Destructive lytic lesion involving left scapula partially imaged.   - Sachs will discuss Betty/Dex.   - SPEP & light chains pending.      # Pancytopenia 2/2 to MM & related therapy. Pomalyst most recently. Recommended dose decrease with subsequent cycles.  - CBC w differential daily.  - Keep HgB > 8, PLT > 10.   - Will get PRBC today HgB 7.8.     # Acute Kidney Injury/Failure 2/2 to ATN. Cr 4.5 at OSH (up from baseline 0.8-1.1). Likely r/t contrast (got facial bone contrast CT on 12/27 at OSH, + underlying MM) vs. worsening MM vs. Sepsis/infection. No evidence of hypotension in OSH records. Unable to see if he was anuric at OSH.  # Hyperkalemia.  # Hyperphosphatemia.   # Hyperuricemia.  # Chronic kidney disease 2/2 to MM.  # Hx. CHRISTIANE requiring HD from sepsis.   - Avoid nephrotoxins & contrast.  - Consult nephrology, renal US pending, FENa 16.1, UCr22, Michel 109.   - BMP daily.  - Telemetry continues  this AM.   - Voiding well  - IVF at 100 ml/hr (did D5 r/t hypoglycemia overnight, poor PO intake).      # Hypercalcemia on Zometa. Last dose of Zometa was 12/7/17, on monthly schedule.  - Continue IVF.     # HCAP & Sinusitis.   # Fevers.  Streaky R lung  base opacity. S/P multiple recurrent hospitalizations. Most recently 12/11-12/21, 12/25-12/30, & now 1/1- current (transfer from OSH). CT sinus + for sinusitis (most prominent in maxillary & sphenoid sinuses. Now with head CT 1/2/18 showing persistent sinusitis (layering paranasal sinus fluid & mucosal thickening).   - Urine & blood CX NTD from OSH. No sputum Cx recorded.   - Recurrent admissions for sepsis/infection (received multiple lines of antibiotics), recurrent pancytopenia 2/2 to therapy, will continue PTA zosyn (good anaerobic coverage in setting of sinusitis). -- Changed to Merrem 1/3 with persistent fevers (hx. Pseudomonas infection in past per wife at [facility] when was septic/required MV).  - ENT consult: saw mild inflammation, no fungus identified.  - Getting Chest CT today & RVP r/t persistent fevers.   - Send Asp Gal & 1,3 BDF.   - ID consult.   - Sputum culture ordered to be induced by RT (collection pending) - unable to collect.  - Repeat blood culture & UA/UC ordered (NTD, monitor cultures).    # Hyperammonemia. Ammonia was 70 on admission. LFT normal. INR. Also hypoglycemic overnight poor PO intake. Concern for sepsis. Azotemia + with CHRISTIANE. Did lactulose with 4 stools 1/3, mentation improved (unclear why if r/t lactulose vs. Clearing of narcotics).  - D/C lactulose with improved mentation.  - Added hepatic panel & ammonia to tomorrow's labs.      # ID PPX. Continue Acyclovir 400 mg BID.      # GERD, hx. Candida esophagitis.  - Continue protonix 40 mg EC BID.      # Hx. Vtach s/p ICD placement.  # Hx. HTN.  - Diltiazem 60 mg BID (PTA) on hold, monitoring BP for sepsis r/t infection.   - Remote history of Metoprolol use but not seen in last 2 hospitalization/clinic notes from December.    # Chronic pain 2/2 to MM. Multiple fractures/lytic lesions.  HOLD PTA MEDS: home oxyCODONE (OXYCONTIN) 10 mg SUSTAINED release tablet Take 10 mg in the AM and 20 mg (2 tablets) in the PM. HYDROmorphone (DILAUDID) 2 mg  tablet Take 1 tablet by mouth every 4 hours if needed.  - Consider palliative care consult.  - Denies pain, mentation improving but not at baseline will continue to hold.      # Peripheral neuropathy 2/2 to MM therapy. Hold gabapentin (NEURONTIN) 300 mg capsule Take 1 capsule by mouth 2 times daily.  - Denies pain, mentation improving but not at baseline will continue to hold.     # Major depression. Continue citalopram (CELEXA) 40 mg tablet Take 1 tablet by mouth once daily.     # Weakness/deconditioning 2/2 to multiple hospitalizations, MM/therapy, & chronic disease.  # Chronic fatigue.  - PT/OT consult.     FEN: D5 1/2 NS at 100 ml/hr.   - Regular diet as tolerated.  - No electrolyte replacement r/t CHRISTIANE, hyperkalemia, hyperphosphatemia.    # Mild-Moderate Malnutrition 2/2 to chronic disease/infections, multiple hospitalizations.  - Regular diet, encourage snacks/supplements.  - Juan Jose counts initiated.    PPX (DVT/GI): protonix BID, mechanical DVT r/t TCP.  LINES/DRAINS: Port.  CONSULTS: Nephrology, ENT, OT, PT.  CODE: Full.  DISPO: CHRISTIANE & fevers keeping inpatient. Have not done dialysis to date. Getting chest CT to further evaluate PNA, sinusitis evaluated yesterday.     Cynthia Kyle, [facility], [phone].  Hematology/Oncology, January 4, 2018.    Interval history Still febrile, but improved mentation. More awake. Sinuses were evaluated by ENT, mild inflammation no concern for fungus but cultures pending. No cough, no SOB, no chest pain, but crackles on exam & infiltrate seen on CXR upon admission (will get a chest CT w/o contrast). Still putting out adequate amounts of urine 2.5 L out. Had looser stools with lactulose yesterday & some nausea this AM. With improved mentation (even though increased ammonia) will d/c lactulose. Check hepatic panel & ammonia in AM. Patient reports not remembering much the last few days. Reports he was really tired. Denies chest pain, palpitations, dizziness. Working with PT/OT  thinks TCU at d/c. Dr. Guillen to discuss Betty/Dex with him for further MM therapy. His SPEP & light chains blood are pending.     Physical Exam  Constitutional: awake, sitting at bedside with meal, again a little slow to respond but overall better, he is awake, making sense, talking to wife & daughter, doesn't remember much from last few days.   Eyes: PERRL, EOMI, sclera clear, conjunctiva normal.  ENT: very dry mouth, thick secretions, no blood/open lesions. No nasal drainage noted.   Respiratory: Non-labored breathing, good air exchange, but diffuse crackles in R base. No cough on exam.   Cardiovascular: RRR, no murmur noted.  GI: + bowel sounds, soft, non-distended, NTTP.  Skin: No concerning lesions or rash on exposed areas.  Musculoskeletal: No edema kev LEs.  Neurologic: awake, no focal deficits.  Psych: flat, more interactive today.    Rounding:  Temp:  [97.1  F (36.2  C)-100.6  F (38.1  C)] 98.5  F (36.9  C)  Pulse:  [108-119] 108  Heart Rate:  [107-113] 112  Resp:  [16-20] 16  BP: (138-160)/(81-93) 147/84  SpO2:  [94 %-99 %] 94 %  ----------------------------------  I/O last 3 completed shifts:  In: 3041.67 [P.O.:640; I.V.:2101.67]  Out: 2530 [Urine:1630; Other:900]  ----------------------------------  Vitals:    01/03/18 0947 01/04/18 0700   Weight: 80 kg (176 lb 5.9 oz) 78.9 kg (174 lb)     Recent Labs   Lab Test  01/04/18   0429  01/03/18   0358  01/02/18   1625   WBC  2.9*  2.3*  Duplicate request  1.6*   HGB  7.8*  7.7*  Duplicate request  8.1*   PLT  45*  40*  Duplicate request  35*   NA  139  141  138   POTASSIUM  4.0  5.0  5.0   CHLORIDE  110*  113*  110*   CO2  16*  15*  18*   ANIONGAP  13  13  10   BUN  27  30  29   CR  4.78*  4.73*  4.38*   PARADISE  10.5*  9.8  10.0   MAG  1.4*  1.6  1.5*   PHOS  5.3*  7.1*  7.0*   LDH   --    --   464*   URIC  6.8  7.6*  7.1   BILITOTAL   --    --   0.6   ALKPHOS   --    --   131   ALT   --    --   12   AST   --    --   30   ALBUMIN   --    --   1.6*        ----------------------------------  No results found for this or any previous visit (from the past 24 hour(s)).  -----------------------------    sodium chloride (PF)  20 mL Intracatheter Q8H     heparin lock flush  5-10 mL Intracatheter Q24H     heparin  5 mL Intracatheter Q28 Days     meropenem  500 mg Intravenous Q12H     citalopram (celeXA) tablet 40 mg  40 mg Oral Daily     pantoprazole  40 mg Oral BID AC       dextrose 5% and 0.45% NaCl 100 mL/hr at 01/04/18 0838     - MEDICATION INSTRUCTIONS -       glucose **OR** dextrose **OR** glucagon, sodium chloride (PF), heparin lock flush, sodium chloride (PF), - MEDICATION INSTRUCTIONS -, prochlorperazine **OR** prochlorperazine, ondansetron **OR** ondansetron **OR** [DISCONTINUED] ondansetron, acetaminophen, naloxone          I have seen, interviewed, and examined the patient independently.  I have reviewed the vital signs and labs.  This note reflects my assessment and plan.    53 yo male with Rel/ref MM IgA s/p auto 2014, multiple regiens of chemo, 11/2017 Betty/Pom/Dex with rapid pancytopenia. Chronic, multiple Fxs,     Now adm here with new ARF with Cr to 4.7 (B/L < 1.0) for work up. Renal U/S (uninformative) and renal consult in progress. Very much appreciate their input. I discussed case with Dr. Ayala today: will keep IVF at 100-150 cc/hr with lasix PRN to maintain good UOP. Lung with b/l crackles at bases so will give lasix 40 mg and repeat if needed.  He did get a bit of IV contrast on 12/27. Also on Zosyn which can be nephrotoxic.     Will get SPEP/Light chains for staging.     Sinusitis seen on CT. Appreciate ENT eval and sampling. Will get CT chest and ID c/s (persistently febrile).    If infectious and renal issues stable, will consider resuming therapy for MM.     Flaca Guillen MD/PhD

## 2018-01-04 NOTE — PROGRESS NOTES
Nephrology Progress Note  01/04/2018      Jeffrey Real is a 52 year old male with known relapsed MM on chemo, recent hospital admission for sepsis, discharged to home on 12/30 on a Zpack and readmitted on 1/1/18. Transferred from OSH to [facility] on 1/2/18 for AMS, electrolyte abnormalities and CHRISTIANE.      Interval History :  No improvement in creat despite IVF.  However he has increasing diarrheal stool ON.  Has been on lactulose.  3.6 L in and 2 L out.  UOP is 1200 cc. Wt is down 1 kg.  Not uremic. Febrile to 11.7.  ALC is 200.  CT reading pending this morning    Interval progress notes, imaging studies and labs reviewed    ASSESSMENT AND RECOMMENDATIONS:      1. CHRISTIANE - No improvement in renal function. Patient is not pre renal given lack of improvement with IVF. He has no hydro on Renal US and kidneys appear normal. However, given the combination of high protein from Myeloma, hypercalcemia and contrast exposure likely has precipitated in his tubules causing obstruction/ATN. Michel 109 and Fena 16.1 suggesting ATN.    He does have h/o CHRISTIANE requiring RRT in Feb 2017. He did have a mild CHRISTIANE in Nov with peak creat of 1.6 on 11/25. Unfortunately did not have another creat drawn following the contrast on 12/27/17.   - No indication for RRT at this time, but will be monitoring closely   - continue  cc per hour.  Could put 2 amps bicarb in D5.   -  IV lasx 40 mg  Bid   - Wife and patient agreeable to RRT if renal function deteriorates   - Continue to monitor renal function for recovery with daily chemistry labs   - Avoid nephrotoxins, hypotension   - Renally dose medications     2. Volume status - He appears euvolemic. No edema, hypoxia. Bibasilar crackles on lung exam. Blood pressure is 130-140. UO 1200 cc over last 24 hrs + some incontinent events. Weight today is down 1 kg to 78.9   - continue IVF   - consider Chan for accurate I/O. He has episodes of incontinence   - Standing daily weights   - Strict I/O     3.  Electrolytes - No acute concerns. K 4.0  Na 139     4. Acid base - Bicarb 15. Etiology likely metabolic acidosis 2/2 CHRISTIANE, but blood gasses would confirm.    - rec add bicarb to IVF     5. BMD - Hypercalemia is chronic, however, more elevated for several days prior to admission.  Dehydration may have been making this worse given improvement with rehydration. Ica today was 5.1. He was taking Ca/D at home, but denies additional Ca. Has known h/o lytic lesions.    - Hypercalcemia can cause CHRISTIANE so will have to monitor closely   - Phos 7.1. Etiology CHRISTIANE. From a renal standpoint does not need to be treated.      6. Anemia - Hgb 7.8 following rehydration. Has known pancytopenia 2/2 MM and chemo. Hgb goal is > 8. Getting RBC today   - Transfusions per Oncology     7. Chronic pain - Was taking scheduled Oxycontin/ Gabapentin and prn Dilaudid. All have been discontinued.    - May have some AMS given opiates in setting of CHRISTIANE   - HCT showing some chronic sinusitis, but no other acute pathology. His cognition is improved today   - Would monitor for withdrawal.      8. Multiple Myeloma - Per primary team. No h/o Myeloma kidney. Total protein 10.7. Has been in the 10-11 range since 9/17. Albumin however is much lower than usual. Bili and enzymes normal. LD is 464.      9. HTN - B/P 130-140/, HR teens - 120. PTA meds: Diltiazem 60 mg bid.     10. Sepsis - Having fevers to 100.7. PCT 0.8. On Meropenem. Blood, urine, sinus cxs NTD. W/U in progress. ALC is 200  - CMV, pneumocystis prophy per team  - check CMV IgG and  PCR to determine current CMV status     Recommendations were communicated to primary team directly     Sadia Ayala MD     [phone]    Review of Systems:   I reviewed the following systems:  GI: Eating some but not much. Family offering food/liquid  Neuro:  Confusion is improved today  Constitutional:  Having fevers  CV: Denies dyspnea, CP or edema.  : Voiding w/o martinez, intermit incontinent    Physical Exam:  "  I/O last 3 completed shifts:  In: 3041.67 [P.O.:640; I.V.:2101.67]  Out: 2530 [Urine:1630; Other:900]   /85  Pulse 108  Temp 98.9  F (37.2  C) (Oral)  Resp 18  Ht 1.778 m (5' 10\")  Wt 78.9 kg (174 lb)  SpO2 97%  BMI 24.97 kg/m2     GENERAL APPEARANCE: Alert, interactive. Family present  EYES: no scleral icterus, pupils equal  Pulmonary: lungs bibasilar crackles. Breathing is non labored. Not on supplemental oxygen.   CV: tachy   - Edema - none  GI: soft, nontender, non distended  MS: no evidence of inflammation in joints, no muscle tenderness  : voiding w/o martinez  SKIN: no rash, warm, dry, no cyanosis  NEURO: Improved. Oriented to city, month, year and family.     Labs:   All labs reviewed by me  Electrolytes/Renal -   Recent Labs   Lab Test  01/04/18   0429 01/03/18   0358  01/02/18   1625   NA  139  141  138   POTASSIUM  4.0  5.0  5.0   CHLORIDE  110*  113*  110*   CO2  16*  15*  18*   BUN  27  30  29   CR  4.78*  4.73*  4.38*   GLC  98  47*  52*   PARADISE  10.5*  9.8  10.0   MAG  1.4*  1.6  1.5*   PHOS  5.3*  7.1*  7.0*       CBC -   Recent Labs   Lab Test  01/04/18   0429 01/03/18   0358  01/02/18   1625   WBC  2.9*  2.3*  Duplicate request  1.6*   HGB  7.8*  7.7*  Duplicate request  8.1*   PLT  45*  40*  Duplicate request  35*       LFTs -   Recent Labs   Lab Test  01/02/18   1625   ALKPHOS  131   BILITOTAL  0.6   ALT  12   AST  30   PROTTOTAL  10.7*   ALBUMIN  1.6*       Iron Panel - No lab results found.      Imaging:  EXAMINATION: Bilateral renal ultrasound, 1/3/2018 8:55 AM      COMPARISON: CT 6/19/2017     HISTORY: Acute kidney injury     FINDINGS:     Right kidney: Measures 10.9 cm in length. Parenchyma is of normal  thickness and echogenicity. No focal mass. No hydronephrosis.     Left kidney: Measures 12.9 cm in length. Parenchyma is of normal  thickness and echogenicity. No focal mass. No hydronephrosis.      Bladder: Unremarkable.         IMPRESSION:  1.  No hydronephrosis    Current " Medications:    sodium chloride (PF)  20 mL Intracatheter Q8H     heparin lock flush  5-10 mL Intracatheter Q24H     heparin  5 mL Intracatheter Q28 Days     meropenem  500 mg Intravenous Q12H     citalopram (celeXA) tablet 40 mg  40 mg Oral Daily     pantoprazole  40 mg Oral BID AC       dextrose 5% and 0.45% NaCl 100 mL/hr at 01/04/18 0838     - MEDICATION INSTRUCTIONS -       Sadia Ayala MD

## 2018-01-04 NOTE — PROGRESS NOTES
[facility], [place]   Antimicrobial Management Team (AMT) Note              To: Heme Malignancy  Unit: 7D  Allergies   Allergen Reactions     Blood-Group Specific Substance      Other reaction(s): Other - Describe In Comment Field  Patient has a Nonspecific antibody. Blood Product orders may be delayed.  Draw one red top and two purple top tubes for ALL Type and Screen/ Type and Crossmatch orders.     Chlorhexidine      Other reaction(s): Irritation At Patch Site  Itching with Prevantic Swabs     Levofloxacin Rash     Stopped levaquin and rash resolved by 8/8/14       Brief Summary: Jeffrey Real is a 52 y.o. male with PMH of refractory IgA MM s/p auto SCT (2014) on Darzalex/Pom/Dex, HTN, h/o Vtach s/p ICD, depression, and neuropathy admitted on 1/2/17 from OSH with AMS and sepsis 2/2 suspected pneumonia/sinusitis.   HPI: Patient was recently admitted from 12/25-12/30 at OSH where he was diagnosed with pneumonia and sinusitis and sent home on azithromycin and Augmentin. On 1/1 he was re-admitted to OSH for progressive weakness, lethargy, and AMS. Denies any fever, cough, abdominal pain, chest pain, or diarrhea but does endorse sinus pain/pressure and drainage which remains unresolved despite IV antibiotics. Patient was given one dose of vancomycin in the ED and initiated on Zosyn which was changed to meropenem upon admission to [facility] due to persistent fevers on Zosyn.     Interval History  Hospitalizations: 12/11-12/21,12/25-12/30  Assessment:   #Sinusitis vs. HAP   Leukopenia is present due to chemotherapy and procalcitonin remains negative. Patient does continue to have fevers. Blood cultures, sputum cultures, and sinus cultures (fungal and bacterial) obtained on 1/2 remain NGTD. CXR on 1/1 from the OSH shows bibasilar streaky and patchy opacities, especially in the right lung base, but improved upper right lung opacities from last hospitalization. Head CT from 1/2 shows layering  paranasal sinus fluid and mucosal thickening but the nasal endoscopy by ENT revealed only mild inflammation in the right nasal cavity with no obvious purulent drainage. Patient does not meet criteria for a carbapenem as he has no history of an ESBL organism. Since patient is at risk for an MDR organism due to many recent hospitalizations and IV antibiotic use in the past 90 days, appropriate to continue using Zosyn for HAP and sinusitis. Lack of improvement on antibiotics from this hospitalization and last are concerning, obtain ID consult to further assess.    Recommendation/Interventions:  1). Discontinue meropenem.   2). Reinitiate Zosyn 2.25 g q6h.  3). Obtain ID consult.     Discussed with ID Staff - Dr. Alex Cheatham MD and Jamaal Cruz PharmD    Current Anti-infective Orders:  Anti-infectives (Future)    Start     Dose/Rate Route Frequency Ordered Stop    01/03/18 1000  meropenem (MERREM) 500 mg in 10 mL SWFI for IVP Vial      500 mg  over 3-5 Minutes Intravenous EVERY 12 HOURS 01/03/18 0914            Vitals and other clinical features  Vitals       01/02 0700  -  01/03 0659 01/03 0700  -  01/04 0659 01/04 0700  -  01/04 0827   Most Recent    Temp ( F) 96.5 -  99.7    97.1 -  100.7      98.5     98.5 (36.9)    Pulse 103 -  122    109 -  121      108     108    Heart Rate   107 -  113       111    Resp 16 -  18    15 -  20      16     16    /85 -  146/89    138/81 -  (!) 160/93      147/84     147/84    SpO2 (%) 95 -  96    95 -  99      98     98        Temperature curve:      Labs  Estimated Creatinine Clearance: 20.2 mL/min (based on Cr of 4.78).  Recent Labs   Lab Test  01/04/18   0429 01/03/18   0358  01/02/18   1625   CR  4.78*  4.73*  4.38*       Recent Labs   Lab Test  01/04/18   0429 01/03/18   0358  01/02/18   1625   WBC  2.9*  2.3*  Duplicate request  1.6*   ANEU  1.9  1.6  1.2*   ALYM  0.2*  0.3*  0.1*   PAM  0.3  0.4  0.2   AEOS  0.1  0.1  0.0   HGB  7.8*  7.7*   Duplicate request  8.1*   HCT  25.4*  24.5*  Duplicate request  26.5*   MCV  93  96  Duplicate request  95   PLT  45*  40*  Duplicate request  35*       Recent Labs   Lab Test  01/02/18   1625   BILITOTAL  0.6   ALKPHOS  131   ALBUMIN  1.6*   AST  30   ALT  12       Recent Labs   Lab Test  01/04/18   0429  01/03/18   0342  01/02/18   1830   LACT  1.1  0.7   --    PCAL   --    --   0.80       Culture results with specimen source  Culture Micro   Date Value Ref Range Status   01/04/2018 PENDING  Preliminary   01/04/2018 PENDING  Preliminary   01/03/2018 PENDING  Preliminary   01/03/2018 PENDING  Preliminary   01/02/2018 No growth  Final   01/02/2018 No growth after 2 days  Preliminary    Specimen Description   Date Value Ref Range Status   01/04/2018 Blood Left Hand  Final   01/04/2018 Blood Port  Final   01/03/2018 Sinus Right middle meatus  Final   01/03/2018 Sinus Right middle meatus  Final   01/03/2018 Sinus Right middle meatus  Final   01/02/2018 Catheterized Urine  Final        Urine Studies     Recent Labs   Lab Test  01/02/18   1901   URINEPH  6.0   NITRITE  Negative   LEUKEST  Negative   WBCU  1       Imaging:  Us Renal Complete    Result Date: 1/3/2018  EXAMINATION: Bilateral renal ultrasound, 1/3/2018 8:55 AM COMPARISON: CT 6/19/2017 HISTORY: Acute kidney injury FINDINGS: Right kidney: Measures 10.9 cm in length. Parenchyma is of normal thickness and echogenicity. No focal mass. No hydronephrosis. Left kidney: Measures 12.9 cm in length. Parenchyma is of normal thickness and echogenicity. No focal mass. No hydronephrosis. Bladder: Unremarkable.     IMPRESSION: 1.  No hydronephrosis. HENRY MOLINA MD    Ct Head W/o Contrast    Result Date: 1/2/2018  CT HEAD W/O CONTRAST 1/2/2018 6:45 PM Provided History: altered mental status, very lethargic/out of it, please NO contrast r/t CHRISTIANE; Comparison: None available. Technique: Using multidetector thin collimation helical acquisition technique, axial,  coronal and sagittal CT images from the skull base to the vertex were obtained without intravenous contrast. Findings:  No intracranial hemorrhage, mass effect, or midline shift. The ventricles are proportionate to the cerebral sulci. The gray to white matter differentiation of the cerebral hemispheres is preserved. The basal cisterns are patent. Mild nonspecific regions of hypoattenuation in the subcortical white matter of both cerebral hemispheres. Layering fluid/mucosal thickening within the bilateral maxillary sinuses and sphenoid sinus. Mild dependent left mastoid effusion. Right mastoid air cells are clear. Innumerable small lytic calvarial and skull base lesions.      Impression: 1. No acute intracranial pathology. 2. Mild chronic small vessel ischemic disease. 3. Layering paranasal sinus fluid and mucosal thickening, which can be seen in the setting of acute sinusitis. 4. Innumerable lytic lesions consistent with history of multiple myeloma. I have personally reviewed the examination and initial interpretation and I agree with the findings. PRESLEY CHEN MD

## 2018-01-04 NOTE — PLAN OF CARE
Problem: Patient Care Overview  Goal: Plan of Care/Patient Progress Review  Discharge Planner OT   Patient plan for discharge: Did not verbalize  Current status: Patient alert to place, but not date or time.  Patient having more LB weakness with R Knee buckling while standing.  CGA/Min A x 1 to ambulate to bathroom, toilet transfer and stand 5 minutes with CGA.  Max A to change incontinence pad.    Barriers to return to prior living situation: Cognition, weakness, fall risk, needs 24 hour assist  Recommendations for discharge: TCU  Rationale for recommendations: Continued OT 6x/week for maximum independence with ADLs/mobility       Entered by: Krystal Powell 01/04/2018 8:32 AM

## 2018-01-04 NOTE — PLAN OF CARE
Problem: Renal Failure/Kidney Injury, Acute (Adult)  Goal: Signs and Symptoms of Listed Potential Problems Will be Absent, Minimized or Managed (Renal Failure/Kidney Injury, Acute)  Signs and symptoms of listed potential problems will be absent, minimized or managed by discharge/transition of care (reference Renal Failure/Kidney Injury, Acute (Adult) CPG).     Tmax 100.6, blood cultures drawn, MD aware. Continues on telemetry w/ sinus tach. OVSS. Triggered sepsis, lactic acid 1.1. Continues w/ lethargy & disorientated to situation/time. Loose BM x1 overnight. Pt has room service. Per MD note, BG checks q 4hrs (no orders). BS 90, 98, 82. Continue to monitor & w/ POC.     Hgb: 7.8, order prepared.

## 2018-01-05 ENCOUNTER — APPOINTMENT (OUTPATIENT)
Dept: PHYSICAL THERAPY | Facility: CLINIC | Age: 53
DRG: 871 | End: 2018-01-05
Attending: INTERNAL MEDICINE
Payer: COMMERCIAL

## 2018-01-05 LAB
ALBUMIN SERPL ELPH-MCNC: 2.4 G/DL (ref 3.7–5.1)
ALBUMIN SERPL-MCNC: 1.7 G/DL (ref 3.4–5)
ALP SERPL-CCNC: 128 U/L (ref 40–150)
ALPHA1 GLOB SERPL ELPH-MCNC: 0.4 G/DL (ref 0.2–0.4)
ALPHA2 GLOB SERPL ELPH-MCNC: 0.8 G/DL (ref 0.5–0.9)
ALT SERPL W P-5'-P-CCNC: 11 U/L (ref 0–70)
AMMONIA PLAS-SCNC: 95 UMOL/L (ref 10–50)
ANION GAP SERPL CALCULATED.3IONS-SCNC: 12 MMOL/L (ref 3–14)
ANISOCYTOSIS BLD QL SMEAR: SLIGHT
AST SERPL W P-5'-P-CCNC: 19 U/L (ref 0–45)
B-GLOBULIN SERPL ELPH-MCNC: 0.6 G/DL (ref 0.6–1)
BACTERIA SPEC CULT: ABNORMAL
BASOPHILS # BLD AUTO: 0 10E9/L (ref 0–0.2)
BASOPHILS NFR BLD AUTO: 0 %
BILIRUB DIRECT SERPL-MCNC: 0.1 MG/DL (ref 0–0.2)
BILIRUB SERPL-MCNC: 0.4 MG/DL (ref 0.2–1.3)
BUN SERPL-MCNC: 25 MG/DL (ref 7–30)
CALCIUM SERPL-MCNC: 11.4 MG/DL (ref 8.5–10.1)
CHLORIDE SERPL-SCNC: 110 MMOL/L (ref 94–109)
CMV DNA SPEC NAA+PROBE-ACNC: NORMAL [IU]/ML
CMV DNA SPEC NAA+PROBE-LOG#: NORMAL {LOG_IU}/ML
CMV IGG SERPL QL IA: 3.9 AI (ref 0–0.8)
CO2 SERPL-SCNC: 18 MMOL/L (ref 20–32)
CREAT SERPL-MCNC: 4.49 MG/DL (ref 0.66–1.25)
CRYPTOC AG SPEC QL: NORMAL
DIFFERENTIAL METHOD BLD: ABNORMAL
EOSINOPHIL # BLD AUTO: 0.2 10E9/L (ref 0–0.7)
EOSINOPHIL NFR BLD AUTO: 8 %
ERYTHROCYTE [DISTWIDTH] IN BLOOD BY AUTOMATED COUNT: 17.4 % (ref 10–15)
GAMMA GLOB SERPL ELPH-MCNC: 6.4 G/DL (ref 0.7–1.6)
GFR SERPL CREATININE-BSD FRML MDRD: 14 ML/MIN/1.7M2
GLUCOSE SERPL-MCNC: 70 MG/DL (ref 70–99)
HADV DNA # SPEC NAA+PROBE: NORMAL COPIES/ML
HADV DNA SPEC NAA+PROBE-LOG#: NORMAL LOG COPIES/ML
HCT VFR BLD AUTO: 29.7 % (ref 40–53)
HGB BLD-MCNC: 9.6 G/DL (ref 13.3–17.7)
HHV6 DNA # SPEC NAA+PROBE: NORMAL COPIES/ML
HHV6 DNA SPEC NAA+PROBE-LOG#: NORMAL LOG COPIES/ML
HSV1 DNA SPEC QL NAA+PROBE: NEGATIVE
HSV2 DNA SPEC QL NAA+PROBE: NEGATIVE
IGG SERPL-MCNC: 259 MG/DL (ref 695–1620)
LACTATE BLD-SCNC: 1.5 MMOL/L (ref 0.7–2)
LYMPHOCYTES # BLD AUTO: 0.2 10E9/L (ref 0.8–5.3)
LYMPHOCYTES NFR BLD AUTO: 7 %
Lab: ABNORMAL
M PROTEIN SERPL ELPH-MCNC: 1.5 G/DL
MAGNESIUM SERPL-MCNC: 1.4 MG/DL (ref 1.6–2.3)
MCH RBC QN AUTO: 30 PG (ref 26.5–33)
MCHC RBC AUTO-ENTMCNC: 32.3 G/DL (ref 31.5–36.5)
MCV RBC AUTO: 93 FL (ref 78–100)
METAMYELOCYTES # BLD: 0.1 10E9/L
METAMYELOCYTES NFR BLD MANUAL: 3 %
MONOCYTES # BLD AUTO: 0.3 10E9/L (ref 0–1.3)
MONOCYTES NFR BLD AUTO: 10 %
MYELOCYTES # BLD: 0.1 10E9/L
MYELOCYTES NFR BLD MANUAL: 2 %
NEUTROPHILS # BLD AUTO: 1.9 10E9/L (ref 1.6–8.3)
NEUTROPHILS NFR BLD AUTO: 69 %
PHOSPHATE SERPL-MCNC: 6.2 MG/DL (ref 2.5–4.5)
PLATELET # BLD AUTO: 41 10E9/L (ref 150–450)
POTASSIUM SERPL-SCNC: 4 MMOL/L (ref 3.4–5.3)
PROMYELOCYTES # BLD MANUAL: 0 10E9/L
PROMYELOCYTES NFR BLD MANUAL: 1 %
PROT PATTERN SERPL ELPH-IMP: ABNORMAL
PROT SERPL-MCNC: 11.1 G/DL (ref 6.8–8.8)
RBC # BLD AUTO: 3.2 10E12/L (ref 4.4–5.9)
SODIUM SERPL-SCNC: 140 MMOL/L (ref 133–144)
SPECIMEN SOURCE: ABNORMAL
SPECIMEN SOURCE: NORMAL
URATE SERPL-MCNC: 7 MG/DL (ref 3.5–7.2)
WBC # BLD AUTO: 2.8 10E9/L (ref 4–11)

## 2018-01-05 PROCEDURE — 86644 CMV ANTIBODY: CPT | Performed by: NURSE PRACTITIONER

## 2018-01-05 PROCEDURE — 87899 AGENT NOS ASSAY W/OPTIC: CPT | Performed by: NURSE PRACTITIONER

## 2018-01-05 PROCEDURE — 83735 ASSAY OF MAGNESIUM: CPT | Performed by: NURSE PRACTITIONER

## 2018-01-05 PROCEDURE — 40000193 ZZH STATISTIC PT WARD VISIT

## 2018-01-05 PROCEDURE — 87799 DETECT AGENT NOS DNA QUANT: CPT | Performed by: NURSE PRACTITIONER

## 2018-01-05 PROCEDURE — 87449 NOS EACH ORGANISM AG IA: CPT | Performed by: NURSE PRACTITIONER

## 2018-01-05 PROCEDURE — 36592 COLLECT BLOOD FROM PICC: CPT | Performed by: INTERNAL MEDICINE

## 2018-01-05 PROCEDURE — 25000132 ZZH RX MED GY IP 250 OP 250 PS 637

## 2018-01-05 PROCEDURE — 85025 COMPLETE CBC W/AUTO DIFF WBC: CPT | Performed by: NURSE PRACTITIONER

## 2018-01-05 PROCEDURE — 87305 ASPERGILLUS AG IA: CPT | Performed by: INTERNAL MEDICINE

## 2018-01-05 PROCEDURE — 25000128 H RX IP 250 OP 636: Performed by: NURSE PRACTITIONER

## 2018-01-05 PROCEDURE — 25000132 ZZH RX MED GY IP 250 OP 250 PS 637: Performed by: NURSE PRACTITIONER

## 2018-01-05 PROCEDURE — 97110 THERAPEUTIC EXERCISES: CPT | Mod: GP

## 2018-01-05 PROCEDURE — 12000008 ZZH R&B INTERMEDIATE UMMC

## 2018-01-05 PROCEDURE — 87533 HHV-6 DNA QUANT: CPT | Performed by: NURSE PRACTITIONER

## 2018-01-05 PROCEDURE — 97530 THERAPEUTIC ACTIVITIES: CPT | Mod: GP

## 2018-01-05 PROCEDURE — 82140 ASSAY OF AMMONIA: CPT | Performed by: NURSE PRACTITIONER

## 2018-01-05 PROCEDURE — 40000275 ZZH STATISTIC RCP TIME EA 10 MIN

## 2018-01-05 PROCEDURE — 84550 ASSAY OF BLOOD/URIC ACID: CPT | Performed by: NURSE PRACTITIONER

## 2018-01-05 PROCEDURE — 83883 ASSAY NEPHELOMETRY NOT SPEC: CPT | Performed by: NURSE PRACTITIONER

## 2018-01-05 PROCEDURE — 82784 ASSAY IGA/IGD/IGG/IGM EACH: CPT | Performed by: NURSE PRACTITIONER

## 2018-01-05 PROCEDURE — 83605 ASSAY OF LACTIC ACID: CPT | Performed by: INTERNAL MEDICINE

## 2018-01-05 PROCEDURE — 87449 NOS EACH ORGANISM AG IA: CPT | Performed by: INTERNAL MEDICINE

## 2018-01-05 PROCEDURE — 94640 AIRWAY INHALATION TREATMENT: CPT

## 2018-01-05 PROCEDURE — 87529 HSV DNA AMP PROBE: CPT | Performed by: NURSE PRACTITIONER

## 2018-01-05 PROCEDURE — 84100 ASSAY OF PHOSPHORUS: CPT | Performed by: NURSE PRACTITIONER

## 2018-01-05 PROCEDURE — 36592 COLLECT BLOOD FROM PICC: CPT | Performed by: NURSE PRACTITIONER

## 2018-01-05 PROCEDURE — 97116 GAIT TRAINING THERAPY: CPT | Mod: GP

## 2018-01-05 PROCEDURE — 87633 RESP VIRUS 12-25 TARGETS: CPT | Performed by: NURSE PRACTITIONER

## 2018-01-05 PROCEDURE — 25000125 ZZHC RX 250: Performed by: NURSE PRACTITIONER

## 2018-01-05 PROCEDURE — 80048 BASIC METABOLIC PNL TOTAL CA: CPT | Performed by: NURSE PRACTITIONER

## 2018-01-05 PROCEDURE — 80076 HEPATIC FUNCTION PANEL: CPT | Performed by: NURSE PRACTITIONER

## 2018-01-05 PROCEDURE — 94642 AEROSOL INHALATION TREATMENT: CPT

## 2018-01-05 RX ORDER — EPINEPHRINE 1 MG/ML
0.3 INJECTION, SOLUTION, CONCENTRATE INTRAVENOUS EVERY 5 MIN PRN
Status: ACTIVE | OUTPATIENT
Start: 2018-01-06 | End: 2018-01-07

## 2018-01-05 RX ORDER — DIPHENHYDRAMINE HYDROCHLORIDE 50 MG/ML
50 INJECTION INTRAMUSCULAR; INTRAVENOUS
Status: CANCELLED
Start: 2018-01-29

## 2018-01-05 RX ORDER — ALBUTEROL SULFATE 90 UG/1
1-2 AEROSOL, METERED RESPIRATORY (INHALATION)
Status: CANCELLED
Start: 2018-01-22

## 2018-01-05 RX ORDER — EPINEPHRINE 1 MG/ML
0.3 INJECTION, SOLUTION, CONCENTRATE INTRAVENOUS EVERY 5 MIN PRN
Status: CANCELLED | OUTPATIENT
Start: 2018-01-29

## 2018-01-05 RX ORDER — EPINEPHRINE 1 MG/ML
0.3 INJECTION, SOLUTION, CONCENTRATE INTRAVENOUS EVERY 5 MIN PRN
Status: CANCELLED | OUTPATIENT
Start: 2018-01-22

## 2018-01-05 RX ORDER — LORAZEPAM 2 MG/ML
0.5 INJECTION INTRAMUSCULAR EVERY 4 HOURS PRN
Status: CANCELLED
Start: 2018-01-22

## 2018-01-05 RX ORDER — DIPHENHYDRAMINE HYDROCHLORIDE 50 MG/ML
50 INJECTION INTRAMUSCULAR; INTRAVENOUS
Status: CANCELLED
Start: 2018-01-22

## 2018-01-05 RX ORDER — ACETAMINOPHEN 325 MG/1
650 TABLET ORAL ONCE
Status: COMPLETED | OUTPATIENT
Start: 2018-01-06 | End: 2018-01-06

## 2018-01-05 RX ORDER — DILTIAZEM HCL 60 MG
60 TABLET ORAL 2 TIMES DAILY
Status: DISCONTINUED | OUTPATIENT
Start: 2018-01-05 | End: 2018-01-17 | Stop reason: HOSPADM

## 2018-01-05 RX ORDER — MEPERIDINE HYDROCHLORIDE 50 MG/ML
25 INJECTION INTRAMUSCULAR; INTRAVENOUS; SUBCUTANEOUS EVERY 30 MIN PRN
Status: CANCELLED | OUTPATIENT
Start: 2018-01-22

## 2018-01-05 RX ORDER — ALBUTEROL SULFATE 0.83 MG/ML
2.5 SOLUTION RESPIRATORY (INHALATION) ONCE
Status: COMPLETED | OUTPATIENT
Start: 2018-01-05 | End: 2018-01-05

## 2018-01-05 RX ORDER — PENTAMIDINE ISETHIONATE 300 MG/300MG
300 INHALANT RESPIRATORY (INHALATION) ONCE
Status: COMPLETED | OUTPATIENT
Start: 2018-01-05 | End: 2018-01-05

## 2018-01-05 RX ORDER — EPINEPHRINE 0.3 MG/.3ML
0.3 INJECTION SUBCUTANEOUS EVERY 5 MIN PRN
Status: DISCONTINUED | OUTPATIENT
Start: 2018-01-05 | End: 2018-01-05

## 2018-01-05 RX ORDER — SODIUM CHLORIDE 9 MG/ML
1000 INJECTION, SOLUTION INTRAVENOUS CONTINUOUS PRN
Status: CANCELLED
Start: 2018-01-22

## 2018-01-05 RX ORDER — METHYLPREDNISOLONE SODIUM SUCCINATE 125 MG/2ML
125 INJECTION, POWDER, LYOPHILIZED, FOR SOLUTION INTRAMUSCULAR; INTRAVENOUS
Status: CANCELLED
Start: 2018-01-22

## 2018-01-05 RX ORDER — SODIUM CHLORIDE 9 MG/ML
1000 INJECTION, SOLUTION INTRAVENOUS CONTINUOUS PRN
Status: ACTIVE | OUTPATIENT
Start: 2018-01-06 | End: 2018-01-07

## 2018-01-05 RX ORDER — SODIUM CHLORIDE 9 MG/ML
1000 INJECTION, SOLUTION INTRAVENOUS CONTINUOUS PRN
Status: CANCELLED
Start: 2018-01-29

## 2018-01-05 RX ORDER — MEPERIDINE HYDROCHLORIDE 50 MG/ML
25 INJECTION INTRAMUSCULAR; INTRAVENOUS; SUBCUTANEOUS EVERY 30 MIN PRN
Status: CANCELLED | OUTPATIENT
Start: 2018-01-29

## 2018-01-05 RX ORDER — ALBUTEROL SULFATE 0.83 MG/ML
2.5 SOLUTION RESPIRATORY (INHALATION)
Status: ACTIVE | OUTPATIENT
Start: 2018-01-06 | End: 2018-01-07

## 2018-01-05 RX ORDER — ACETAMINOPHEN 325 MG/1
650 TABLET ORAL ONCE
Status: CANCELLED | OUTPATIENT
Start: 2018-01-29

## 2018-01-05 RX ORDER — ACETAMINOPHEN 325 MG/1
650 TABLET ORAL ONCE
Status: CANCELLED | OUTPATIENT
Start: 2018-01-22

## 2018-01-05 RX ORDER — DIPHENHYDRAMINE HCL 50 MG
50 CAPSULE ORAL ONCE
Status: COMPLETED | OUTPATIENT
Start: 2018-01-06 | End: 2018-01-06

## 2018-01-05 RX ORDER — DIPHENHYDRAMINE HCL 50 MG
50 CAPSULE ORAL ONCE
Status: CANCELLED | OUTPATIENT
Start: 2018-01-22

## 2018-01-05 RX ORDER — DEXAMETHASONE 4 MG/1
4 TABLET ORAL DAILY
Status: COMPLETED | OUTPATIENT
Start: 2018-01-07 | End: 2018-01-08

## 2018-01-05 RX ORDER — METHYLPREDNISOLONE SODIUM SUCCINATE 125 MG/2ML
125 INJECTION, POWDER, LYOPHILIZED, FOR SOLUTION INTRAMUSCULAR; INTRAVENOUS
Status: ACTIVE | OUTPATIENT
Start: 2018-01-06 | End: 2018-01-07

## 2018-01-05 RX ORDER — ALBUTEROL SULFATE 0.83 MG/ML
2.5 SOLUTION RESPIRATORY (INHALATION)
Status: CANCELLED | OUTPATIENT
Start: 2018-01-22

## 2018-01-05 RX ORDER — DEXAMETHASONE SODIUM PHOSPHATE 4 MG/ML
20 INJECTION, SOLUTION INTRA-ARTICULAR; INTRALESIONAL; INTRAMUSCULAR; INTRAVENOUS; SOFT TISSUE ONCE
Status: COMPLETED | OUTPATIENT
Start: 2018-01-06 | End: 2018-01-06

## 2018-01-05 RX ORDER — LORAZEPAM 2 MG/ML
0.5 INJECTION INTRAMUSCULAR EVERY 4 HOURS PRN
Status: DISCONTINUED | OUTPATIENT
Start: 2018-01-05 | End: 2018-01-10

## 2018-01-05 RX ORDER — METHYLPREDNISOLONE SODIUM SUCCINATE 125 MG/2ML
125 INJECTION, POWDER, LYOPHILIZED, FOR SOLUTION INTRAMUSCULAR; INTRAVENOUS
Status: CANCELLED
Start: 2018-01-29

## 2018-01-05 RX ORDER — ALBUTEROL SULFATE 90 UG/1
1-2 AEROSOL, METERED RESPIRATORY (INHALATION)
Status: CANCELLED
Start: 2018-01-29

## 2018-01-05 RX ORDER — DIPHENHYDRAMINE HCL 50 MG
50 CAPSULE ORAL ONCE
Status: CANCELLED | OUTPATIENT
Start: 2018-01-29

## 2018-01-05 RX ORDER — EPINEPHRINE 0.3 MG/.3ML
0.3 INJECTION SUBCUTANEOUS EVERY 5 MIN PRN
Status: CANCELLED | OUTPATIENT
Start: 2018-01-29

## 2018-01-05 RX ORDER — EPINEPHRINE 0.3 MG/.3ML
0.3 INJECTION SUBCUTANEOUS EVERY 5 MIN PRN
Status: CANCELLED | OUTPATIENT
Start: 2018-01-22

## 2018-01-05 RX ORDER — LORAZEPAM 2 MG/ML
0.5 INJECTION INTRAMUSCULAR EVERY 4 HOURS PRN
Status: CANCELLED
Start: 2018-01-29

## 2018-01-05 RX ORDER — MEPERIDINE HYDROCHLORIDE 50 MG/ML
25 INJECTION INTRAMUSCULAR; INTRAVENOUS; SUBCUTANEOUS EVERY 30 MIN PRN
Status: ACTIVE | OUTPATIENT
Start: 2018-01-06 | End: 2018-01-07

## 2018-01-05 RX ORDER — DIPHENHYDRAMINE HYDROCHLORIDE 50 MG/ML
50 INJECTION INTRAMUSCULAR; INTRAVENOUS
Status: ACTIVE | OUTPATIENT
Start: 2018-01-06 | End: 2018-01-07

## 2018-01-05 RX ORDER — ALBUTEROL SULFATE 0.83 MG/ML
2.5 SOLUTION RESPIRATORY (INHALATION)
Status: CANCELLED | OUTPATIENT
Start: 2018-01-29

## 2018-01-05 RX ORDER — ALBUTEROL SULFATE 90 UG/1
1-2 AEROSOL, METERED RESPIRATORY (INHALATION)
Status: ACTIVE | OUTPATIENT
Start: 2018-01-06 | End: 2018-01-07

## 2018-01-05 RX ADMIN — CITALOPRAM HYDROBROMIDE 40 MG: 20 TABLET ORAL at 08:20

## 2018-01-05 RX ADMIN — VALACYCLOVIR HYDROCHLORIDE 1000 MG: 500 TABLET, FILM COATED ORAL at 14:15

## 2018-01-05 RX ADMIN — DILTIAZEM HYDROCHLORIDE 60 MG: 60 TABLET, FILM COATED ORAL at 11:54

## 2018-01-05 RX ADMIN — PENTAMIDINE ISETHIONATE 300 MG: 300 INHALANT RESPIRATORY (INHALATION) at 17:20

## 2018-01-05 RX ADMIN — ALBUTEROL SULFATE 2.5 MG: 2.5 SOLUTION RESPIRATORY (INHALATION) at 17:20

## 2018-01-05 RX ADMIN — DILTIAZEM HYDROCHLORIDE 60 MG: 60 TABLET, FILM COATED ORAL at 20:10

## 2018-01-05 RX ADMIN — PANTOPRAZOLE SODIUM 40 MG: 40 TABLET, DELAYED RELEASE ORAL at 15:48

## 2018-01-05 RX ADMIN — PANTOPRAZOLE SODIUM 40 MG: 40 TABLET, DELAYED RELEASE ORAL at 08:20

## 2018-01-05 RX ADMIN — SODIUM CHLORIDE, PRESERVATIVE FREE 5 ML: 5 INJECTION INTRAVENOUS at 11:42

## 2018-01-05 RX ADMIN — SODIUM CHLORIDE, PRESERVATIVE FREE 5 ML: 5 INJECTION INTRAVENOUS at 20:39

## 2018-01-05 NOTE — PLAN OF CARE
Problem: Patient Care Overview  Goal: Plan of Care/Patient Progress Review    VSS. Tele NSR overnight. Pt up to bathroom x2 overnight with large urine output. 1 loose stool overnight. Bed alarm continues for safety. Tylenol given for back pain with relief.  Continue to monitor.

## 2018-01-05 NOTE — PLAN OF CARE
Problem: Patient Care Overview  Goal: Plan of Care/Patient Progress Review  Outcome: No Change    VSS. Tele sinus tachy. 2 loose BMs this evening r/t previous lactulose pt was receiving. Bladder scan done post void and only 36cc left in bladder despite pt small void volume. Bed alarm on as pt impulsive and slightly unsteady. Wife at bedside. Poor appetite and did not eat much for dinner. First dose of prophylactic valcyclovir administered. Continue POC.

## 2018-01-05 NOTE — PROGRESS NOTES
.  DATE/TIME  (DOT-TD, DOT-NOW) CHEMO CHECK ACTIVITY (REGIMEN & DOSE CHECK, DAY, DOSE #, NAME OF CHEMO #1)  CHEMO DRUG #2  CHEMO DRUG #3 NAME OF RN #1 (USE DOT-ME HERE) NAME OF RN#2 (2ND RN TO LOG IN SEPARATELY)   1/5/2018  3:34 PM Daratumumab Protocol Check   Jesus Cheatham     1/6/2018  2:39 PM   Daratumumab double check   Angelic Cheatham      1/12/2018  2:13 PM   Weekly Daratumumab protocol double check    Hero Casper     01/13/18  11:20 AM   Daratumumab double check dose 1 of 1   Breann Vargas

## 2018-01-05 NOTE — PROGRESS NOTES
Nephrology Progress Note  01/05/2018         Jeffrey Real is a 52 year old male with known relapsed MM on chemo, recent hospital admission for sepsis, discharged to home on 12/30 on a Zpack and readmitted on 1/1/18. Transferred from OSH to [facility] on 1/2/18 for AMS, electrolyte abnormalities and CHRISTIANE.       ASSESSMENT AND RECOMMENDATIONS:       1. CHRISTIANE - Creat beginning to decline, creat 4.4 today from 4.7 x 2 dys. He is non oliguric.   Etiology felt to be ATN given the combination of high protein from Myeloma, hypercalcemia and contrast exposure which likely resulted in precipitation in his tubules causing obstruction/ATN. Michel 109 and Fena 16.1 suggesting ATN.    He does have h/o CHRISTIANE requiring RRT in Feb 2017. He did have a mild CHRISTIANE in Nov with peak creat of 1.6 on 11/25. Unfortunately did not have another creat drawn following the contrast on 12/27/17.   - No indication for RRT , but will continue to monitor closely   - Wife and patient agreeable to RRT if renal function deteriorates   - Continue to monitor renal function for recovery with daily chemistry labs   - Avoid nephrotoxins, hypotension   - Renal dose medications      2. Volume status - Mild hypervolemia with IVF since admission. IVF has been discontinued today. Has trace edema, no hypoxia or dyspnea. Blood pressure is 130-150/. UO 1105 cc + 3 unmeasured over last 24 hrs. Weight today is 78.2 kg. Admission weight 80.0 kg   - Standing daily weights   - Strict I/O      3. Electrolytes - No acute concerns. K 4.0, Na 140      4. Acid base - Bicarb 18. Etiology likely metabolic acidosis 2/2 CHRISTIANE, but blood gasses would confirm.    - No need for intervention at this time but will monitor closely      5. BMD - Hypercalemia is chronic, however, more elevated for several days prior to admission.  Dehydration may have been making this worse given improvement with rehydration. Ica 5.1 on 1/2/18. He was taking Ca/D at home, but denies additional Ca. Has known h/o lytic  "lesions. He receives Zometa monthly. Last dose 12/7/17   - Hypercalcemia can cause CHRISTIANE so will have to monitor closely   - Phos 6.2. Etiology CHRISTIANE. From a renal standpoint does not need to be treated.       6. Anemia - Hgb 9.6. Has known pancytopenia 2/2 MM and chemo. Hgb goal is > 8. Last RBC 1/3   - Transfusions per Oncology      7.  Multiple Myeloma - Per primary team. No h/o Myeloma kidney. Total protein 11.1. Has been in the 10-11 range since 9/17. Albumin however is much lower than usual. Bili and enzymes normal. LD is 464.       9. HTN - B/P 130-150/ following rehydration, HR 90's. PTA meds: Diltiazem 60 mg bid.    - Primary team resumed Diltiazem today. I have added hold orders     10. Sepsis? - Was having fevers. Source remains unclear. All Cxs NTD. Only on Valtrex for now to treat possible HSV infection.        Recommendations were communicated to primary team via progress note      Humaira Crisostomo, NP   [phone]     Seen and discussed with Dr. Ayala     Interval History :      Creat now beginning to decline  Non oliguric  AMS resolving  Interval progress notes, imaging studies and labs reviewed     Review of Systems:   I reviewed the following systems:  GI: Eating has improved, but appetite not great.   Neuro:  Cognition improved and about 75% of baseline per wife  Constitutional:  afebrile  CV: Denies dyspnea, CP or edema.  : Voiding w/o martinez    Physical Exam:   I/O last 3 completed shifts:  In: 3100 [P.O.:940; I.V.:1860]  Out: 1910 [Urine:1910]   /84  Pulse 99  Temp 98.4  F (36.9  C) (Oral)  Resp 18  Ht 1.778 m (5' 10\")  Wt 78.2 kg (172 lb 6.4 oz)  SpO2 98%  BMI 24.74 kg/m2     GENERAL APPEARANCE: Alert, interactive. Wife present  EYES: no scleral icterus, pupils equal  Pulmonary: lungs clear to auscultation. Breathing is non labored. Not on supplemental oxygen.   CV: tachy   - Edema - trace  GI: soft, nontender, non distended  MS: no evidence of inflammation in joints, no muscle " tenderness  : voiding w/o martinez  SKIN: no rash, warm, dry, no cyanosis  NEURO: Improved. Able to hold a conversation    Labs:   All labs reviewed by me  Electrolytes/Renal -   Recent Labs   Lab Test  01/05/18 0651 01/04/18 0429 01/03/18   0358   NA  140  139  141   POTASSIUM  4.0  4.0  5.0   CHLORIDE  110*  110*  113*   CO2  18*  16*  15*   BUN  25  27  30   CR  4.49*  4.78*  4.73*   GLC  70  98  47*   PARADISE  11.4*  10.5*  9.8   MAG  1.4*  1.4*  1.6   PHOS  6.2*  5.3*  7.1*       CBC -   Recent Labs   Lab Test  01/05/18 0651  01/04/18 0429 01/03/18   0358   WBC  2.8*  2.9*  2.3*   HGB  9.6*  7.8*  7.7*   PLT  41*  45*  40*       LFTs -   Recent Labs   Lab Test  01/05/18 0651  01/02/18   1625   ALKPHOS  128  131   BILITOTAL  0.4  0.6   ALT  11  12   AST  19  30   PROTTOTAL  11.1*  10.7*   ALBUMIN  1.7*  1.6*       Iron Panel - No lab results found.      Imaging:  EXAMINATION: CT CHEST W/O CONTRAST, 1/4/2018 11:18 AM     TECHNIQUE:  Helical CT images from the thoracic inlet through the lung  bases were obtained without IV contrast. Contrast dose: None     COMPARISON: Chest CT 8/8/2017     HISTORY: evaluate PNA further, persistent fevers, NO CONTRAST with MM  & CHRISTIANE;      FINDINGS: Significant motion artifact degrading the quality of the  study as well as low lung volumes.     The central tracheobronchial tree is patent. Basilar predominant  atelectasis. Diffuse mosaic attenuation. Central bronchial wall  thickening. No pneumothorax or pleural effusion. No definite masses or  nodules.      The heart size is normal. Mild calcific atherosclerosis of the left  anterior descending artery. Left chest wall implanted cardiac device.  Right IJ Port-A-Cath with tip in the right atrium. No pericardial  effusion. Normal caliber and configuration of the thoracic great  vessels. Calcified subcarinal lymph node.     Limited views of the abdomen reveal no worrisome pathology. Numerous  subacute to chronic bilateral rib  fractures are again noted involving  virtually every rib. No substantial change in the numerous lytic  lesions scattered throughout the thoracic spine and ribs. No  substantial change in the compression deformities of T5 4, T7, T9,  T11, and T12         IMPRESSION:   1. Diffuse mosaic attenuation suggestive of small vessel or small  airway disease.   2. Basilar predominant atelectasis without definite consolidation.  Please note however that this study is suboptimal due to substantial  respiratory motion.  3. Central bronchial wall thickening consistent with infectious or  inflammatory bronchitis.  4. Numerous subacute to chronic pathologic rib fractures and  compression deformities of the thoracic spine associated lytic lesions  consistent with known multiple myeloma.     I have personally reviewed the examination and initial interpretation  and I agree with the findings.     ESA FENG MD    Current Medications:    diltiazem  60 mg Oral BID     pentamidine  300 mg Inhalation Once    Followed by     albuterol  2.5 mg Nebulization Once     valACYclovir  1,000 mg Oral Q24H     sodium chloride (PF)  20 mL Intracatheter Q8H     heparin lock flush  5-10 mL Intracatheter Q24H     heparin  5 mL Intracatheter Q28 Days     citalopram (celeXA) tablet 40 mg  40 mg Oral Daily     pantoprazole  40 mg Oral BID AC       - MEDICATION INSTRUCTIONS -       Humaira Crisostomo, NP

## 2018-01-05 NOTE — PROGRESS NOTES
Hematology / Oncology Progress Note   Date of Service: 01/05/2018     Assessment & Plan Jeffrey Real is a 51 y/o M with complex PMH including refractory IgA Multiple Myeloma s/p autologous transplant 08/2014, most recently on Daralex/Pom/Decadron admitted from OSH r/t CHRISTIANE on CKD, persistent sinusitis, HCAP, concern for sepsis, & altered mental status.     # Altered mental status. On admit very lethargic/sleepy on exam. Hyperreflexic. Clonus. Likely multifactorial (kidney injury, long acting pain medications, hypercalcemia, infectious-sinusitis/PNA).  - Head CT w/o contrast shows no evidence of acute intracranial pathology but + sinusitis & mild chronic small vessel ischemic disease.  - Hold oxycodone, dilaudid, ativan & gabapentin r/t sedating effects/CHRISTIANE, not concerned about withdrawal symptoms at this time.   - Improved. (Cr better, Ammonia still high, ID work up negative to date see details below).     # High Risk/Resistant Multiple Myeloma IgA kappa. S/P autologous peripheral blood stem cell transplant in 08/2014.   # Multiple lytic lesions 2/2 MM (ribs, T7, T9, T11, T12) s/p XRT to thoracolumbar spine.  # Fracture L ischium.  # Hypogammaglobulinemia s/p IVIG replacement.  S/P multiple lines of therapy including auto tx (RVD, auto+melphalan, Velcade Maintenance, VDT-PACE, followed by PCD) complex cytogenetics (17 p deletion, p53 deletion) most recently on pomalidomide/carfilzomib & dex 20+ cycles (started 02/2017) with progression in November 2017 was then switched to daratumumab/pomalidomide & dex.  - Plan was to try to get M-spike < 1 gram% then pursue Allogeneic transplant (chelita Torre MD in clinic 11/27/17). Has brother as haplo-identical donor.  - 12/26/17 found to have pathologic fractures of T2 spinous process & bilateral T1 transverse process. Destructive lytic lesion involving left scapula partially imaged.   - [facility] will discuss Betty/Dex.   - SPEP & light chains pending.      # Pancytopenia 2/2  to MM & related therapy. Pomalyst most recently. Recommended dose decrease with subsequent cycles.  - CBC w differential daily.  - Keep HgB > 8, PLT > 10.      # Acute Kidney Injury/Failure 2/2 to ATN. Cr 4.5 at OSH (up from baseline 0.8-1.1). Likely r/t contrast (got facial bone contrast CT on 12/27 at OSH, + underlying MM) vs. worsening MM vs. Sepsis/infection. No evidence of hypotension in OSH records. Unable to see if he was anuric at OSH.  # Hyperkalemia.  # Hyperphosphatemia.   # Hyperuricemia.  # Chronic kidney disease 2/2 to MM.  # Hx. CHRISTIANE requiring HD from sepsis.   - Avoid nephrotoxins & contrast.  - Consult nephrology.  - BMP daily.  - d/c tele, K normal.  - Voiding well.  - d/c IVF good PO intake.     # Hypercalcemia on Zometa. Last dose of Zometa was 12/7/17, on monthly schedule.  - Due for Zometa in 2 days.      # HCAP & Sinusitis.   # Fevers.  # ID work up.  # Hypogammaglobulinemia.  Streaky R lung base opacity. S/P multiple recurrent hospitalizations. Most recently 12/11-12/21, 12/25-12/30, & now 1/1- current (transfer from OSH). CT sinus + for sinusitis (most prominent in maxillary & sphenoid sinuses. Now with head CT 1/2/18 showing persistent sinusitis (layering paranasal sinus fluid & mucosal thickening).   - Urine & blood CX NTD from OSH. No sputum Cx recorded.   - Recurrent admissions for sepsis/infection (received multiple lines of antibiotics), recurrent pancytopenia 2/2 to therapy, will continue PTA zosyn (good anaerobic coverage in setting of sinusitis). D/C'ed antibiotics (1/4)  - ENT consult: saw mild inflammation, no fungus identified, sinus cultures NTD.  - Sputum culture ordered to be induced by RT (collection pending) - unable to collect.  - Repeat blood culture & UA/UC ordered (NTD, monitor cultures).  - ID consult. Aren't impressed for bacterial infection. Would like to send blood viruses instead (HSV 1&2 PCR (from blood & oral swab), Cryptococcus (negative), Adenovirus, HHV6, CMV  PCR (won't let me send b/c someone send IgG)). Also IgG level. RVP.   - D/C antibiotics treat empirically with Valtrex for HSV, concerning lip lesion.     # Hyperammonemia. Ammonia was 70 on admission. LFT normal. INR. Also hypoglycemic overnight poor PO intake. Concern for sepsis. Azotemia + with CHRISTIANE. Did lactulose with 4 stools 1/3, mentation improved (unclear why if r/t lactulose vs. Clearing of narcotics).  - D/C lactulose with improved mentation.  - Added hepatic panel & ammonia to tomorrow's labs.   - Ammonia continues to be high, LFT normal.      # ID PPX. On treatment dose valtrex, doing pentamidine today.      # GERD, hx. Candida esophagitis.  - Continue protonix 40 mg EC BID.      # Hx. Vtach s/p ICD placement.  # Hx. HTN.  - Diltiazem 60 mg BID (PTA) on hold, monitoring BP for sepsis r/t infection. Restarted 1/5/17.   - Remote history of Metoprolol use but not seen in last 2 hospitalization/clinic notes from December.    # Chronic pain 2/2 to MM. Multiple fractures/lytic lesions.  HOLD PTA MEDS: home oxyCODONE (OXYCONTIN) 10 mg SUSTAINED release tablet Take 10 mg in the AM and 20 mg (2 tablets) in the PM. HYDROmorphone (DILAUDID) 2 mg tablet Take 1 tablet by mouth every 4 hours if needed.  - Consider palliative care consult.  - Denies pain, mentation improving but not at baseline will continue to hold.      # Peripheral neuropathy 2/2 to MM therapy. Hold gabapentin (NEURONTIN) 300 mg capsule Take 1 capsule by mouth 2 times daily.  - Denies pain, mentation improving but not at baseline will continue to hold.     # Major depression. Continue citalopram (CELEXA) 40 mg tablet Take 1 tablet by mouth once daily.     # Weakness/deconditioning 2/2 to multiple hospitalizations, MM/therapy, & chronic disease.  # Chronic fatigue.  - PT/OT consult.   - Recommending TCU.    FEN: Encourage PO fluid intake.   - Regular diet as tolerated.  - No electrolyte replacement r/t CHRISTIANE, hyperkalemia, hyperphosphatemia.    #  Mild-Moderate Malnutrition 2/2 to chronic disease/infections, multiple hospitalizations.  - Regular diet, encourage snacks/supplements.  - Juan Jose counts initiated (1/5).    PPX (DVT/GI): protonix BID, mechanical DVT r/t TCP.  LINES/DRAINS: Port.  CONSULTS: Nephrology, ENT, OT, PT, ID.   CODE: Full.  DISPO: CHRISTIANE Improving. Viral work up underway. No fevers 24 hours. OT/PT recommend TCU, social work aware. Plan to possibly dose Betty this stay. Likely TCU early next week.     Cynthia Kyle, [facility], [phone].  Hematology/Oncology, January 5, 2018.    Interval history Afebrile 24 hours, doing well. More awake, alert. Really wants to get in the shower. D/C tele r/t normalized K. Want patient to be able to be more mobile. Eating & drinking, d/c IVF. Voiding adequately. Wife at bedside we discussed TCU, agreeable. No chills, no cough, no sinus pain pressure, no abdominal pain/cramping, no diarrhea (was looser yesterday s/p lactulose but more firm now). No rash. No bleeding. No HA, vision changes.     Physical Exam  Constitutional: awake, sitting at bedside joking with wife, eating breakfast. Feels confusion is better.   Eyes: PERRL, EOMI, sclera clear, conjunctiva normal.  ENT: very dry mouth, thick secretions, no blood/open lesions. No nasal drainage noted.   Respiratory: Non-labored breathing, good air exchange, but diffuse crackles in R base. No cough on exam.   Cardiovascular: RRR, no murmur noted.  GI: + bowel sounds, soft, non-distended, NTTP.  Skin: No concerning lesions or rash on exposed areas.  Musculoskeletal: No edema kev LEs.  Neurologic: awake, no focal deficits.  Psych: more interactive today.    Rounding:  Temp:  [96.1  F (35.6  C)-98.9  F (37.2  C)] 96.7  F (35.9  C)  Pulse:  [96-99] 99  Heart Rate:  [] 105  Resp:  [16-18] 18  BP: (137-152)/(80-90) 150/88  SpO2:  [97 %-98 %] 98 %  ----------------------------------  I/O last 3 completed shifts:  In: 4080 [P.O.:1540; I.V.:2240]  Out: 1680  [Urine:1680]  ----------------------------------  Vitals:    01/03/18 0947 01/04/18 0700 01/05/18 0900   Weight: 80 kg (176 lb 5.9 oz) 78.9 kg (174 lb) 78.2 kg (172 lb 6.4 oz)     Recent Labs   Lab Test  01/04/18   0429  01/03/18   0358  01/02/18   1625   WBC  2.9*  2.3*  Duplicate request  1.6*   HGB  7.8*  7.7*  Duplicate request  8.1*   PLT  45*  40*  Duplicate request  35*   NA  139  141  138   POTASSIUM  4.0  5.0  5.0   CHLORIDE  110*  113*  110*   CO2  16*  15*  18*   ANIONGAP  13  13  10   BUN  27  30  29   CR  4.78*  4.73*  4.38*   PARADISE  10.5*  9.8  10.0   MAG  1.4*  1.6  1.5*   PHOS  5.3*  7.1*  7.0*   LDH   --    --   464*   URIC  6.8  7.6*  7.1   BILITOTAL   --    --   0.6   ALKPHOS   --    --   131   ALT   --    --   12   AST   --    --   30   ALBUMIN   --    --   1.6*       ----------------------------------  Recent Results (from the past 24 hour(s))   CT Chest w/o Contrast    Narrative    EXAMINATION: CT CHEST W/O CONTRAST, 1/4/2018 11:18 AM    TECHNIQUE:  Helical CT images from the thoracic inlet through the lung  bases were obtained without IV contrast. Contrast dose: None    COMPARISON: Chest CT 8/8/2017    HISTORY: evaluate PNA further, persistent fevers, NO CONTRAST with MM  & CHRISTIANE;     FINDINGS: Significant motion artifact degrading the quality of the  study as well as low lung volumes.    The central tracheobronchial tree is patent. Basilar predominant  atelectasis. Diffuse mosaic attenuation. Central bronchial wall  thickening. No pneumothorax or pleural effusion. No definite masses or  nodules.     The heart size is normal. Mild calcific atherosclerosis of the left  anterior descending artery. Left chest wall implanted cardiac device.  Right IJ Port-A-Cath with tip in the right atrium. No pericardial  effusion. Normal caliber and configuration of the thoracic great  vessels. Calcified subcarinal lymph node.    Limited views of the abdomen reveal no worrisome pathology. Numerous  subacute to  chronic bilateral rib fractures are again noted involving  virtually every rib. No substantial change in the numerous lytic  lesions scattered throughout the thoracic spine and ribs. No  substantial change in the compression deformities of T5 4, T7, T9,  T11, and T12      Impression    IMPRESSION:   1. Diffuse mosaic attenuation suggestive of small vessel or small  airway disease.   2. Basilar predominant atelectasis without definite consolidation.  Please note however that this study is suboptimal due to substantial  respiratory motion.  3. Central bronchial wall thickening consistent with infectious or  inflammatory bronchitis.  4. Numerous subacute to chronic pathologic rib fractures and  compression deformities of the thoracic spine associated lytic lesions  consistent with known multiple myeloma.    I have personally reviewed the examination and initial interpretation  and I agree with the findings.    ESA FENG MD     -----------------------------    diltiazem  60 mg Oral BID     pentamidine  300 mg Inhalation Once    Followed by     albuterol  2.5 mg Nebulization Once     valACYclovir  1,000 mg Oral Q24H     sodium chloride (PF)  20 mL Intracatheter Q8H     heparin lock flush  5-10 mL Intracatheter Q24H     heparin  5 mL Intracatheter Q28 Days     citalopram (celeXA) tablet 40 mg  40 mg Oral Daily     pantoprazole  40 mg Oral BID AC       - MEDICATION INSTRUCTIONS -       glucose **OR** dextrose **OR** glucagon, sodium chloride (PF), heparin lock flush, sodium chloride (PF), - MEDICATION INSTRUCTIONS -, prochlorperazine **OR** prochlorperazine, ondansetron **OR** ondansetron **OR** [DISCONTINUED] ondansetron, acetaminophen, naloxone          I have seen, interviewed, and examined the patient independently.  I have reviewed the vital signs and labs.  This note reflects my assessment and plan.    53 yo male with Rel/ref MM IgA s/p auto 2014, multiple regiens of chemo, 11/2017 Betty/Pom/Dex with rapid  pancytopenia. Chronic, multiple Fxs,     Now adm here with new ARF with Cr to 4.7 (B/L < 1.0) for work up. Renal U/S (uninformative) and renal consult in progress. Very much appreciate their input. I discussed case with Dr. Ayala today: will keep IVF at 100-150 cc/hr with lasix PRN to maintain good UOP. Lung with b/l crackles at bases so will give lasix 40 mg and repeat if needed.  He did get a bit of IV contrast on 12/27. Also on Zosyn which can be nephrotoxic.     Will get SPEP/Light chains for staging. Still pending today. Given     Sinusitis seen on CT. Appreciate ENT eval and sampling. Appreciate ID recs, work up in progress    Afebrile now x>24 hrs. infectious and renal issues stable, will start Daratumumab and Dex to help control MM (Betty is weekly for first 8 infusions).     Mental status now good. Ammonia remains elevated. I am not clear on why this is but have d/c'd lactulose since mental status is clear.    Flaca Guillen MD/PhD

## 2018-01-05 NOTE — PLAN OF CARE
Problem: PT General Care Plan  Goal: PT target date for goal attainment  PT: patient displayed progression with gait training and increased activity tolerance.     Discharge Planner PT   Patient plan for discharge: Home  Current status: ambulated 225 feet using rolling walker with minimal assist to CGA and SBA with transfers. Patient displayed right sided neglect and decreased safety awareness. Encouraged to continue to use walker for ambulation and transfers  Barriers to return to prior living situation: limited endurance and strength.   Recommendations for discharge: Home with home PT pending progression. May need TCU to improve independence.   Rationale for recommendations: improve strength, endurance and progress toward independence with functional mobility for safe return to community.        Entered by: Germaine Aaron 01/05/2018 4:04 PM

## 2018-01-05 NOTE — PLAN OF CARE
Problem: Patient Care Overview  Goal: Plan of Care/Patient Progress Review  OT 7D: Attempted to see pt, he was OOR. Will return as time allows.

## 2018-01-05 NOTE — PROGRESS NOTES
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     Please page with any questions or concerns. Over the weekend, Mateo Youngblood and [place] are on call.     Assessment: 51yo man with relapsed IgA kappa multiple myeloma (dx 2013 s/p autologous transplant 8/2014) most recently treated with daratumumab/pomalidomide/dexamethasone (cycle 1 in 11/2017) who was transferred from [facility] to [facility] on 1/2/18 for higher level care in the setting of CHRISTIANE on CKD, sinusitis, pneumonia and altered mental status. 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 He remains afebrile and mental status is starting to clear.     Infectious Disease Issues:  1) Non-neutropenic Fever - Patient has been afebrile x24 hours off antibiotics. He and his family give a good story of pneumonia during the time of his second outside hospitalization with improvement in symptoms with treatment. At present he does not have a cough, sputum production, shortness of breath or hypoxemia. His CT chest findings likely represent residual inflammation after his recent PNA. He also does not have symptoms of acute bacterial sinusitis and nasal endoscopy was largely unremarkable. Nasal culture positive for candida albicans/dubliniensis and coagulase negative staph which both likely represent colonization. For these reasons, would continue to observe off therapy. As he does have low grade temperatures and lymphopenia w/ hypogam from MM and therapy, would be more concerned for an underlying viral process. Will follow-up results of  his HSV oral swab and recommend continuing empirical Valacyclovir treatment. Results of other viral studies have been negative, including serum HSV 1/2, CMV, HHV6, adenovirus.      2) AMS (improving) - He presented to [facility] with his family on 1/1/18 with AMS, found to have CHRISTIANE, possible ELOY/ATN. The AMS is felt likely d/t medications side effects in the setting of renal failure and has improved with holding of meds. He does not have headache or meningismus to suspect a CNS infection, serum cryptococcal ag is negative.      Other Infectious Disease issues include:  - Viral serostatus: CMV-, HSV 1-/2-, VZV+  - Immunization status: due for PCV13  - Gamma globulin status:  on 1/5/18, no need for IVIG from an ID standpoint at this time  - Isolation status: Good hand hygiene.    Katarzyna Youngblood DO  Infectious Diseases Fellow  p: [phone]    Attestation:  I have reviewed today's vital signs, medications, labs and imaging.      Floor time: 25 minutes, Face-to-face time: 10 minutes, Total time: 35 minutes  Alex Cheatham,   Pager [phone]  Jeffrey Real was seen in the hospital by Alex Cheatham on Jan 5th with Dr. Youngblood.  I reviewed the history & exam. Assessment and plan were jointly made.  I agree with and have edited the fellow's note and plan of care.  Alex Cheatham MD.       Interval History:     No significant events documented overnight. Patient is slightly more alert and less confused today per wife. He has been up to the bathroom and is getting ready to take a shower. Has no sinus pressure, drainage, cough, sputum production, chest pain. Was having significant diarrhea secondary to lactulose, but now stools have slowed. No abdominal pain or bloody stools. Has been afebrile for the past 24 hours.      Transplants:  Auto-HCT 8/2014 for IgA kappa MM     History of Infectious Disease Illness (copied from initial consult note):      Mr. Real is a 53yo man with relapsed IgA kappa  multiple myeloma (dx 2013 s/p autologous transplant 8/2014) most recently treated with daratumumab/pomalidomide/dexamethasone (cycle 1 in 11/2017) who was transferred from [facility] to [facility] on 1/2/18 for higher level care in the setting of CHRISTIANE on CKD, sinusitis, pneumonia and altered mental status. The patient has had 3 admission in the past month at [facility]: 12/11-12/21 with neutropenic fever, 12/25-12/30 for neutropenic fever, pneumonia, sinusitis after which he was discharged to home on Augmentin BID x5 days, Azithromycin 500mg daily x5 days, then again admitted 1/1-1/2 with progressive weakness. The patient is still confused and unable to give much history. No headache, vision changes, hallucinations, neck pain/stiffness, focal weakness. Per discussions with his wife, daughter and two friends, he had a productive cough and shortness of breath prior to his second admission at [facility]. The symptoms had significantly improved when discharged at that time and have not returned. Denies any facial pain at present, no sinus congestion, rhinorrhea, or pharyngeal pain. He does have an oral ulcer/abrasion on his inferior lip which has been present for 1 week and may be related to accidentally biting the area.      Since being admitted to [facility], Oxycodone, Dilaudid, Ativan and Gabapentin are being held for possible contribution to his AMS, which is now markedly improved. He was switched from Piperacillin-Tazobactam to Meropenem to cover for possible non-responding pneumonia and sinusitis. ENT was consulted and performed a nasal endoscopy and culture 1/3 with mild inflammation of the right nasal cavity without purulent drainage and no findings of necrosis or invasive fungal infection. Infectious Diseases is consulted to assist with management.     Exposure History: Born in [place]. Lives in [place] with his wife, dog and cat. No international travel. 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    Review of Systems:  CONSTITUTIONAL:  No subjective fevers or chills  EYES: negative for icterus or acute vision changes  ENT:  negative for acute hearing loss, sore throat, painful lower lip lesion unchanged  RESPIRATORY:  negative for cough, sputum or dyspnea  CARDIOVASCULAR:  negative for chest pain, palpitations  GASTROINTESTINAL:  negative for nausea, vomiting, diarrhea as documented above  GENITOURINARY:  negative for dysuria or hematuria  HEME:  No easy bruising or bleeding  INTEGUMENT:  negative for rash or pruritus  NEURO:  Negative for headache         Current Medications & Allergies:       diltiazem  60 mg Oral BID     pentamidine  300 mg Inhalation Once    Followed by     albuterol  2.5 mg Nebulization Once     valACYclovir  1,000 mg Oral Q24H     sodium chloride (PF)  20 mL Intracatheter Q8H     heparin lock flush  5-10 mL Intracatheter Q24H     heparin  5 mL Intracatheter Q28 Days     citalopram (celeXA) tablet 40 mg  40 mg Oral Daily     pantoprazole  40 mg Oral BID AC       Infusions/Drips:    - MEDICATION INSTRUCTIONS -         Allergies   Allergen Reactions     Blood-Group Specific Substance      Other reaction(s): Other - Describe In Comment Field  Patient has a Nonspecific antibody. Blood Product orders may be delayed.  Draw one red top and two purple top tubes for ALL Type and Screen/ Type and Crossmatch orders.     Chlorhexidine      Other reaction(s): Irritation At Patch Site  Itching with Prevantic Swabs     Levofloxacin Rash     Stopped levaquin and rash resolved by 8/8/14            Physical Exam:   Ranges for vital signs:  Temp:  [96.1  F (35.6  C)-98.4  F (36.9  C)] 98.4  F (36.9  C)  Pulse:  [96-99] 99  Heart Rate:  [] 105  Resp:  [16-18] 18  BP: (133-152)/(80-90) 133/84  SpO2:  [97 %-98 %] 98 %  Vitals:    01/03/18 0947 01/04/18 0700 01/05/18 0900   Weight: 80 kg (176 lb 5.9 oz) 78.9 kg (174 lb) 78.2 kg (172 lb 6.4 oz)     Physical Examination:  GENERAL:   Well-developed, well-nourished, sitting at the side of bed in no acute distress after walking back from the bathroom.  HEAD:  Head is normocephalic, atraumatic.  EYES:  Eyes have anicteric sclerae without conjunctival injection.  ENT:  Oropharynx is moist, right sided ulceration on the mucosa of his lower lip.  NECK:  Supple. No cervical lymphadenopathy.  LUNGS:  Fine crackles at bilateral bases.  CARDIOVASCULAR:  Regular rate and rhythm with no murmurs, gallops or rubs. ICD in place with no overlaying erythema.  ABDOMEN:  Normal bowel sounds, soft, nontender. No appreciable hepatosplenomegaly.  SKIN:  No acute rashes. R chest port without any surrounding erythema or exudate.  NEUROLOGIC:  Alert and oriented to person and place, slow to date. Memory impaired. Active x4 extremities.         Laboratory Data:       Metabolic Studies       Recent Labs   Lab Test  01/05/18   0651  01/04/18   0429   01/03/18   0342  01/02/18   1830   NA  140  139   < >   --    --    POTASSIUM  4.0  4.0   < >   --    --    CHLORIDE  110*  110*   < >   --    --    CO2  18*  16*   < >   --    --    ANIONGAP  12  13   < >   --    --    BUN  25  27   < >   --    --    CR  4.49*  4.78*   < >   --    --    GFRESTIMATED  14*  13*   < >   --    --    GLC  70  98   < >   --    --    PARADISE  11.4*  10.5*   < >   --    --    PHOS  6.2*  5.3*   < >   --    --    MAG  1.4*  1.4*   < >   --    --    LACT   --   1.1   --   0.7   --    PCAL   --    --    --    --   0.80    < > = values in this interval not displayed.       Hepatic Studies    Recent Labs   Lab Test  01/05/18   0651  01/02/18   1625   BILITOTAL  0.4  0.6   DBIL  0.1   --    ALKPHOS  128  131   PROTTOTAL  11.1*  10.7*   ALBUMIN  1.7*  1.6*   AST  19  30   ALT  11  12   LDH   --   464*       Hematology Studies      Recent Labs   Lab Test  01/05/18   0651  01/04/18   0429  01/03/18   0358  01/02/18   1625   WBC  2.8*  2.9*  2.3*  Duplicate request  1.6*   ANEU  1.9  1.9  1.6  1.2*   ALYM  0.2*   0.2*  0.3*  0.1*   PAM  0.3  0.3  0.4  0.2   AEOS  0.2  0.1  0.1  0.0   HGB  9.6*  7.8*  7.7*  Duplicate request  8.1*   HCT  29.7*  25.4*  24.5*  Duplicate request  26.5*   PLT  41*  45*  40*  Duplicate request  35*       Clotting Studies    Recent Labs   Lab Test  01/02/18   1625   INR  1.38*   PTT  50*       Urine Studies     Recent Labs   Lab Test  01/02/18   1901   URINEPH  6.0   NITRITE  Negative   LEUKEST  Negative   WBCU  1       Microbiology:  Blood cultures  - Portacath 1/2: no growth  - Port & peripheral 1/4: no growth     Urine culture  - 1/2: no growth     Nasal culture  - 1/3: gram stain negative, culture with light growth CoNS and candida albicans/dubliniensis     Galactomannan Ag - in process  1,3 Beta D glucan - in process  Cryptococcal Ag - negative     Virology:  Respiratory viral panel - in process    CMV IgG positive  CMV PCR not detected  Adenovirus PCR not detected  HHV6 PCR not detected  HSV 1/2 PCR plasma not detected     Imaging:  CT Facial Bones w/ IV contrast (from OSH) 12/27/2017  Sinusitis most prominently maxillary and sphenoid sinuses. Facial bones appear intact.     CXR 2 Views (from OSH) 1/1/2018  Diminished lung volumes with bibasilar streaky and patchy opacities.  There is an increase in streaky opacity at the right lung base in the interim.  The previously noted patchy opacity in the right upper lung has resolved.     CT Head w/o Contrast 1/2/2018 Impression:   1. No acute intracranial pathology.  2. Mild chronic small vessel ischemic disease.  3. Layering paranasal sinus fluid and mucosal thickening, which can be  seen in the setting of acute sinusitis.  4. Innumerable lytic lesions consistent with history of multiple  myeloma.     US Renal 1/3/2018 No hydronephrosis.     CT Chest w/o Contrast 1/4/2018  1. Diffuse mosaic attenuation suggestive of small vessel or small airway disease.   2. Basilar predominant atelectasis without definite consolidation. Please note however  that this study is suboptimal due to substantial respiratory motion.  3. Central bronchial wall thickening consistent with infectious or inflammatory bronchitis.  4. Numerous subacute to chronic pathologic rib fractures and compression deformities of the thoracic spine associated lytic lesions consistent with known multiple myeloma.

## 2018-01-06 ENCOUNTER — APPOINTMENT (OUTPATIENT)
Dept: PHYSICAL THERAPY | Facility: CLINIC | Age: 53
DRG: 871 | End: 2018-01-06
Attending: INTERNAL MEDICINE
Payer: COMMERCIAL

## 2018-01-06 LAB
ANION GAP SERPL CALCULATED.3IONS-SCNC: 11 MMOL/L (ref 3–14)
ANISOCYTOSIS BLD QL SMEAR: SLIGHT
BASOPHILS # BLD AUTO: 0.1 10E9/L (ref 0–0.2)
BASOPHILS NFR BLD AUTO: 3 %
BUN SERPL-MCNC: 24 MG/DL (ref 7–30)
CALCIUM SERPL-MCNC: 11.6 MG/DL (ref 8.5–10.1)
CHLORIDE SERPL-SCNC: 110 MMOL/L (ref 94–109)
CO2 SERPL-SCNC: 19 MMOL/L (ref 20–32)
CREAT SERPL-MCNC: 4.38 MG/DL (ref 0.66–1.25)
DIFFERENTIAL METHOD BLD: ABNORMAL
EOSINOPHIL # BLD AUTO: 0.1 10E9/L (ref 0–0.7)
EOSINOPHIL NFR BLD AUTO: 2 %
ERYTHROCYTE [DISTWIDTH] IN BLOOD BY AUTOMATED COUNT: 17.6 % (ref 10–15)
FLUAV H1 2009 PAND RNA SPEC QL NAA+PROBE: NEGATIVE
FLUAV H1 RNA SPEC QL NAA+PROBE: NEGATIVE
FLUAV H3 RNA SPEC QL NAA+PROBE: NEGATIVE
FLUAV RNA SPEC QL NAA+PROBE: NEGATIVE
FLUBV RNA SPEC QL NAA+PROBE: NEGATIVE
GALACTOMANNAN AG SERPL QL IA: NEGATIVE
GALACTOMANNAN AG SERPL-ACNC: 0.07
GFR SERPL CREATININE-BSD FRML MDRD: 14 ML/MIN/1.7M2
GLUCOSE SERPL-MCNC: 62 MG/DL (ref 70–99)
HADV DNA SPEC QL NAA+PROBE: NEGATIVE
HADV DNA SPEC QL NAA+PROBE: NEGATIVE
HCT VFR BLD AUTO: 28.9 % (ref 40–53)
HGB BLD-MCNC: 9.2 G/DL (ref 13.3–17.7)
HMPV RNA SPEC QL NAA+PROBE: NEGATIVE
HPIV1 RNA SPEC QL NAA+PROBE: NEGATIVE
HPIV2 RNA SPEC QL NAA+PROBE: NEGATIVE
HPIV3 RNA SPEC QL NAA+PROBE: NEGATIVE
LYMPHOCYTES # BLD AUTO: 0.2 10E9/L (ref 0.8–5.3)
LYMPHOCYTES NFR BLD AUTO: 6 %
MAGNESIUM SERPL-MCNC: 1.6 MG/DL (ref 1.6–2.3)
MCH RBC QN AUTO: 29.5 PG (ref 26.5–33)
MCHC RBC AUTO-ENTMCNC: 31.8 G/DL (ref 31.5–36.5)
MCV RBC AUTO: 93 FL (ref 78–100)
METAMYELOCYTES # BLD: 0.1 10E9/L
METAMYELOCYTES NFR BLD MANUAL: 4 %
MICROBIOLOGIST REVIEW: ABNORMAL
MONOCYTES # BLD AUTO: 0.2 10E9/L (ref 0–1.3)
MONOCYTES NFR BLD AUTO: 8 %
MYELOCYTES # BLD: 0.1 10E9/L
MYELOCYTES NFR BLD MANUAL: 3 %
NEUTROPHILS # BLD AUTO: 1.9 10E9/L (ref 1.6–8.3)
NEUTROPHILS NFR BLD AUTO: 74 %
NRBC # BLD AUTO: 0 10*3/UL
NRBC BLD AUTO-RTO: 1 /100
PHOSPHATE SERPL-MCNC: 6.8 MG/DL (ref 2.5–4.5)
PLATELET # BLD AUTO: 51 10E9/L (ref 150–450)
POTASSIUM SERPL-SCNC: 4.2 MMOL/L (ref 3.4–5.3)
RBC # BLD AUTO: 3.12 10E12/L (ref 4.4–5.9)
RHINOVIRUS RNA SPEC QL NAA+PROBE: NEGATIVE
RSV RNA SPEC QL NAA+PROBE: NEGATIVE
RSV RNA SPEC QL NAA+PROBE: POSITIVE
SODIUM SERPL-SCNC: 139 MMOL/L (ref 133–144)
SPECIMEN SOURCE: ABNORMAL
URATE SERPL-MCNC: 7.3 MG/DL (ref 3.5–7.2)
WBC # BLD AUTO: 2.6 10E9/L (ref 4–11)

## 2018-01-06 PROCEDURE — 25000128 H RX IP 250 OP 636: Performed by: INTERNAL MEDICINE

## 2018-01-06 PROCEDURE — 80048 BASIC METABOLIC PNL TOTAL CA: CPT | Performed by: NURSE PRACTITIONER

## 2018-01-06 PROCEDURE — 12000008 ZZH R&B INTERMEDIATE UMMC

## 2018-01-06 PROCEDURE — 84550 ASSAY OF BLOOD/URIC ACID: CPT | Performed by: NURSE PRACTITIONER

## 2018-01-06 PROCEDURE — 93005 ELECTROCARDIOGRAM TRACING: CPT

## 2018-01-06 PROCEDURE — 36592 COLLECT BLOOD FROM PICC: CPT | Performed by: NURSE PRACTITIONER

## 2018-01-06 PROCEDURE — 97530 THERAPEUTIC ACTIVITIES: CPT | Mod: GP | Performed by: PHYSICAL THERAPIST

## 2018-01-06 PROCEDURE — 99233 SBSQ HOSP IP/OBS HIGH 50: CPT | Performed by: INTERNAL MEDICINE

## 2018-01-06 PROCEDURE — 93010 ELECTROCARDIOGRAM REPORT: CPT | Performed by: INTERNAL MEDICINE

## 2018-01-06 PROCEDURE — 84100 ASSAY OF PHOSPHORUS: CPT | Performed by: NURSE PRACTITIONER

## 2018-01-06 PROCEDURE — 25000132 ZZH RX MED GY IP 250 OP 250 PS 637: Performed by: INTERNAL MEDICINE

## 2018-01-06 PROCEDURE — 85025 COMPLETE CBC W/AUTO DIFF WBC: CPT | Performed by: NURSE PRACTITIONER

## 2018-01-06 PROCEDURE — 82784 ASSAY IGA/IGD/IGG/IGM EACH: CPT | Performed by: NURSE PRACTITIONER

## 2018-01-06 PROCEDURE — 25000132 ZZH RX MED GY IP 250 OP 250 PS 637: Performed by: NURSE PRACTITIONER

## 2018-01-06 PROCEDURE — 97116 GAIT TRAINING THERAPY: CPT | Mod: GP | Performed by: PHYSICAL THERAPIST

## 2018-01-06 PROCEDURE — 83735 ASSAY OF MAGNESIUM: CPT | Performed by: NURSE PRACTITIONER

## 2018-01-06 PROCEDURE — 40000193 ZZH STATISTIC PT WARD VISIT: Performed by: PHYSICAL THERAPIST

## 2018-01-06 PROCEDURE — 25000128 H RX IP 250 OP 636: Performed by: NURSE PRACTITIONER

## 2018-01-06 PROCEDURE — 25000132 ZZH RX MED GY IP 250 OP 250 PS 637

## 2018-01-06 PROCEDURE — 86334 IMMUNOFIX E-PHORESIS SERUM: CPT | Performed by: NURSE PRACTITIONER

## 2018-01-06 RX ADMIN — DEXAMETHASONE SODIUM PHOSPHATE 20 MG: 4 INJECTION, SOLUTION INTRA-ARTICULAR; INTRALESIONAL; INTRAMUSCULAR; INTRAVENOUS; SOFT TISSUE at 12:44

## 2018-01-06 RX ADMIN — DARATUMUMAB 1250 MG: 100 INJECTION, SOLUTION, CONCENTRATE INTRAVENOUS at 14:58

## 2018-01-06 RX ADMIN — DILTIAZEM HYDROCHLORIDE 60 MG: 60 TABLET, FILM COATED ORAL at 20:33

## 2018-01-06 RX ADMIN — PANTOPRAZOLE SODIUM 40 MG: 40 TABLET, DELAYED RELEASE ORAL at 09:41

## 2018-01-06 RX ADMIN — SODIUM CHLORIDE, PRESERVATIVE FREE 5 ML: 5 INJECTION INTRAVENOUS at 22:26

## 2018-01-06 RX ADMIN — VALACYCLOVIR HYDROCHLORIDE 1000 MG: 500 TABLET, FILM COATED ORAL at 15:06

## 2018-01-06 RX ADMIN — CITALOPRAM HYDROBROMIDE 40 MG: 20 TABLET ORAL at 09:42

## 2018-01-06 RX ADMIN — SODIUM CHLORIDE 250 ML: 9 INJECTION, SOLUTION INTRAVENOUS at 12:45

## 2018-01-06 RX ADMIN — DILTIAZEM HYDROCHLORIDE 60 MG: 60 TABLET, FILM COATED ORAL at 09:41

## 2018-01-06 RX ADMIN — SODIUM CHLORIDE, PRESERVATIVE FREE 5 ML: 5 INJECTION INTRAVENOUS at 09:41

## 2018-01-06 RX ADMIN — DIPHENHYDRAMINE HYDROCHLORIDE 50 MG: 50 CAPSULE ORAL at 12:44

## 2018-01-06 RX ADMIN — ACETAMINOPHEN 650 MG: 325 TABLET, FILM COATED ORAL at 12:44

## 2018-01-06 RX ADMIN — PANTOPRAZOLE SODIUM 40 MG: 40 TABLET, DELAYED RELEASE ORAL at 18:15

## 2018-01-06 ASSESSMENT — PAIN DESCRIPTION - DESCRIPTORS: DESCRIPTORS: SORE

## 2018-01-06 NOTE — PROGRESS NOTES
Nephrology Progress Note  01/06/2018         Jeffrey Real is a 52 year old male with known relapsed MM on chemo, recent hospital admission for sepsis, discharged to home on 12/30 on a Zpack and readmitted on 1/1/18 for AMS, electrolyte abnormalities and CHRISTIANE.      Interval History :  Remains confused.  Cr unchanged so GFR is 14.  Good UOP.  Has lost 3 kg since admission.  Not dizzy.  Good BP. Has defervesced on antimicrobials and antivirals.     Interval progress notes, imaging studies and labs reviewed     ASSESSMENT AND RECOMMENDATIONS:       1. CHRISTIANE - Creat not changing much.  Remains 4.4 today. Etiology likely precipitation of myeloma protein and contrast agent  in his tubules causing obstruction/ATN. Michel 109 and Fena 16.1 suggesting ATN.    - No indication for RRT , but will continue to monitor closely   - Wife and patient agreeable to RRT if renal function deteriorates   - Avoid nephrotoxins, hypotension   - Renal dose medication   - decrease protonix dose or DC      2. Volume status - Euvolemic.  Weight is down 3 kg since admission.  IVF since admission. Has trace edema, bibasilar rales, no hypoxia or dyspnea. Blood pressure is 120/80.     - Standing daily weights   - Strict I/O   - low threshhold to restart IVF      3. Electrolytes - No acute concerns. K 4.0, Na 140      4. Acid base - Bicarb 19. Etiology likely metabolic acidosis 2/2 CHRISTIANE, but blood gasses would confirm.    - No need for intervention at this time but will monitor closely      5. BMD - Total calcium is up. Ica 5.1 on 1/2/18 suggests he may not have true hypercalcemia since calcium binds to protein.  He was taking Ca/D at home, but denies additional Ca. Has known h/o lytic lesions. He receives Zometa monthly. Last dose 12/7/17    - Phos 6.8. Etiology CHRISTIANE. From a renal standpoint does not need to be treated.    - Repeat ionized calcium    6.  Anemia - known pancytopenia 2/2 MM and chemo. Hgb goal is > 8. Last RBC 1/3   - Transfusions per  "Oncology      7.  Multiple Myeloma - Getting chemo today. No h/o Myeloma kidney. Total protein 11.1. Has been in the 10-11 range since 9/17. Albumin however is much lower than usual. Bili and enzymes normal. LD is 464.       9. HTN - B/P 130-150/ following rehydration, HR 90's. On Diltiazem 60 mg bid.       10. Fever - Has defervesced.  Source remains unclear. All Cxs NTD. Only on Valtrex for now to treat possible HSV infection.        Recommendations were communicated to primary team via progress note      Sadia Ayala MD   [phone]     Review of Systems:   I reviewed the following systems:  GI: Eating has improved, but appetite not great.   Neuro:  Cognition improved and about 75% of baseline per wife  Constitutional:  afebrile  CV: Denies dyspnea, CP or edema.  : Voiding w/o martinez    Physical Exam:   I/O last 3 completed shifts:  In: 950 [P.O.:640; I.V.:60; IV Piggyback:250]  Out: 2190 [Urine:1990; Stool:200]   /80  Pulse 99  Temp 96.5  F (35.8  C) (Oral)  Resp 20  Ht 1.778 m (5' 10\")  Wt 76.4 kg (168 lb 8 oz)  SpO2 98%  BMI 24.18 kg/m2     GENERAL APPEARANCE: Alert, interactive. Wife present  EYES: no scleral icterus, pupils equal  Pulmonary: Bivlasiar rales. Breathing is non labored. Not on supplemental oxygen.   CV: tachy   - Edema - none  GI: soft, nontender, non distended  MS: no evidence of inflammation in joints, no muscle tenderness  : voiding w/o martinez  SKIN: no rash, warm, dry, no cyanosis  NEURO:  Able to hold a conversation but remains confused    Labs:   All labs reviewed by me  Electrolytes/Renal -   Recent Labs   Lab Test  01/06/18   0613  01/05/18   0651  01/04/18   0429   NA  139  140  139   POTASSIUM  4.2  4.0  4.0   CHLORIDE  110*  110*  110*   CO2  19*  18*  16*   BUN  24  25  27   CR  4.38*  4.49*  4.78*   GLC  62*  70  98   PARADISE  11.6*  11.4*  10.5*   MAG  1.6  1.4*  1.4*   PHOS  6.8*  6.2*  5.3*       CBC -   Recent Labs   Lab Test  01/06/18   0613  01/05/18   0651  " 01/04/18   0429   WBC  2.6*  2.8*  2.9*   HGB  9.2*  9.6*  7.8*   PLT  51*  41*  45*       LFTs -   Recent Labs   Lab Test  01/05/18   0651  01/02/18   1625   ALKPHOS  128  131   BILITOTAL  0.4  0.6   ALT  11  12   AST  19  30   PROTTOTAL  11.1*  10.7*   ALBUMIN  1.7*  1.6*       Iron Panel - No lab results found.      Imaging:  EXAMINATION: CT CHEST W/O CONTRAST, 1/4/2018 11:18 AM     TECHNIQUE:  Helical CT images from the thoracic inlet through the lung  bases were obtained without IV contrast. Contrast dose: None     COMPARISON: Chest CT 8/8/2017     HISTORY: evaluate PNA further, persistent fevers, NO CONTRAST with MM  & CHRISTIANE;      FINDINGS: Significant motion artifact degrading the quality of the  study as well as low lung volumes.     The central tracheobronchial tree is patent. Basilar predominant  atelectasis. Diffuse mosaic attenuation. Central bronchial wall  thickening. No pneumothorax or pleural effusion. No definite masses or  nodules.      The heart size is normal. Mild calcific atherosclerosis of the left  anterior descending artery. Left chest wall implanted cardiac device.  Right IJ Port-A-Cath with tip in the right atrium. No pericardial  effusion. Normal caliber and configuration of the thoracic great  vessels. Calcified subcarinal lymph node.     Limited views of the abdomen reveal no worrisome pathology. Numerous  subacute to chronic bilateral rib fractures are again noted involving  virtually every rib. No substantial change in the numerous lytic  lesions scattered throughout the thoracic spine and ribs. No  substantial change in the compression deformities of T5 4, T7, T9,  T11, and T12         IMPRESSION:   1. Diffuse mosaic attenuation suggestive of small vessel or small  airway disease.   2. Basilar predominant atelectasis without definite consolidation.  Please note however that this study is suboptimal due to substantial  respiratory motion.  3. Central bronchial wall thickening consistent  with infectious or  inflammatory bronchitis.  4. Numerous subacute to chronic pathologic rib fractures and  compression deformities of the thoracic spine associated lytic lesions  consistent with known multiple myeloma.     I have personally reviewed the examination and initial interpretation  and I agree with the findings.     ESA FENG MD    Current Medications:    diltiazem  60 mg Oral BID     [START ON 1/7/2018] dexamethasone  4 mg Oral Daily     valACYclovir  1,000 mg Oral Q24H     sodium chloride (PF)  20 mL Intracatheter Q8H     heparin lock flush  5-10 mL Intracatheter Q24H     heparin  5 mL Intracatheter Q28 Days     citalopram (celeXA) tablet 40 mg  40 mg Oral Daily     pantoprazole  40 mg Oral BID AC       - MEDICATION INSTRUCTIONS -       NaCl       - MEDICATION INSTRUCTIONS -       Sadia Ayala MD

## 2018-01-06 NOTE — PHARMACY-CONSULT NOTE
Delirium Consult    Medications evaluated as requested per Delirium Consult.  No medication recommendations at this time, as most likely offenders were held on admission (gabapentin, Oxycontin, trazodone).  Pharmacist will continue to follow as new medications are ordered.    Deepthi Alfredo, PharmD, BCOP

## 2018-01-06 NOTE — PLAN OF CARE
Problem: Patient Care Overview  Goal: Plan of Care/Patient Progress Review  Outcome: Improving  Bed alarm on for safety. In the evening before bed he was oriented to person, time, situation; disoriented to place (knew he was in hospital but admitted getting former OSH's confused with current hospital). When he woke up about 4am he was confused, said he needed to go outside and leave the hospital. RN walked with him down the halls and back to his room, he called his wife and RN spoke with wife and patient about situation while continuing to reorient and redirect pt. Pt able to reorient about 5am and able to go back to sleep. Wife was concerned as this was the worst confusion she has witnessed and wanted the team to know; MD notified; will continue to redirect as needed and practice good sleep hygiene. AVSS ex trending tachy in low 100's. Afebrile. Triggered sepsis protocol; Lactic acid was 1.5. Lip lesion with dried blood, pt bit and picked at. Tolerating regular diet. Voiding adequately. Up with +1 with walker. Considering TCU placement. Continue monitoring and with POC.

## 2018-01-06 NOTE — PROGRESS NOTES
Hematology / Oncology Progress Note   Date of Service: 01/06/2018     Assessment & Plan Jeffrey Real is a 53 y/o M with complex PMH including refractory IgA Multiple Myeloma s/p autologous transplant 08/2014, most recently on Daralex/Pom/Decadron admitted from OSH r/t CHRISTIANE on CKD, persistent sinusitis, HCAP, concern for sepsis, & altered mental status.     Interval history Afebrile 48 hours, patient more confused/delirious this AM. Up in the middle of the night per nursing. Sleeping heavily when I saw him at 0730. Denies N/V. Thinks he's in [facility]. Trouble staying awake. No cough, or SOB, lungs clear on exam. No diarrhea. Good UOP recorded 2 L yesterday. Electrolytes better.   - IgG low ID will discuss if that needs to be replaced (not as concerned for systemic viral illness).   - ID to discuss if we need to continue or d/c Valcyte. HSV 1 & 2 PCR were negative from blood not swab. Can d/c when mouth swab is negative.    - ID felt culture (coag neg staph & candida) were colonizer, no acute infection.   - Discussed high ammonia (curbside) with medicine, felt in the absence of liver failure not a useful test, not normal part of work up for AMS in absence of cirrhosis. No further lactulose.   - Start daratumumab today.   - Went back & revisited patient today r/t chest discomfort with coughing. Pain only with coughing. No other pain. No jaw pain no arm pain no neck pain, no radiating pain. No SOB. No GIVENS. Went on to shower s/p chest pain with cough. Had a normal BM. No syncope/dizziness. Slightly tachy other VSS. No N/V. Will just monitor for now in setting of normal EKG. Could order troponin's if it returns.     # Altered mental status. On admit very lethargic/sleepy on exam. Hyperreflexic. Clonus. Likely multifactorial (kidney injury, long acting pain medications, hypercalcemia, infectious-sinusitis/PNA).  - Head CT w/o contrast shows no evidence of acute intracranial pathology but + sinusitis & mild chronic  small vessel ischemic disease.  - Hold oxycodone, dilaudid, ativan & gabapentin r/t sedating effects/CHRISTIANE, not concerned about withdrawal symptoms at this time.   -Initially improved now with more delirium. Initiate delirium precautions.  -Don't check/follow/treat ammonia.      # High Risk/Resistant Multiple Myeloma IgA kappa. S/P autologous peripheral blood stem cell transplant in 08/2014.   # Multiple lytic lesions 2/2 MM (ribs, T7, T9, T11, T12) s/p XRT to thoracolumbar spine.  # Fracture L ischium.  # Hypogammaglobulinemia s/p IVIG replacement.  S/P multiple lines of therapy including auto tx (RVD, auto+melphalan, Velcade Maintenance, VDT-PACE, followed by PCD) complex cytogenetics (17 p deletion, p53 deletion) most recently on pomalidomide/carfilzomib & dex 20+ cycles (started 02/2017) with progression in November 2017 was then switched to daratumumab/pomalidomide & dex.  - Plan was to try to get M-spike < 1 gram% then pursue Allogeneic transplant (chelita Torre MD in clinic 11/27/17). Has brother as haplo-identical donor.  - 12/26/17 found to have pathologic fractures of T2 spinous process & bilateral T1 transverse process. Destructive lytic lesion involving left scapula partially imaged.   - [facility] will discuss Betty/Dex, likely start 1/6/18.  - SPEP & light chains with evidence of MM.      # Pancytopenia 2/2 to MM & related therapy. Pomalyst most recently. Recommended dose decrease with subsequent cycles.  - CBC w differential daily.  - Keep HgB > 8, PLT > 10.      # Acute Kidney Injury/Failure 2/2 to ATN. Cr 4.5 at OSH (up from baseline 0.8-1.1). Likely r/t contrast (got facial bone contrast CT on 12/27 at OSH, + underlying MM) vs. worsening MM vs. Sepsis/infection. No evidence of hypotension in OSH records. Unable to see if he was anuric at OSH.  # Hyperkalemia.  # Hyperphosphatemia.   # Hyperuricemia.  # Chronic kidney disease 2/2 to MM.  # Hx. CHRISTIANE requiring HD from sepsis.   - Avoid nephrotoxins &  contrast.  - Consult nephrology.  - BMP daily.  - d/c tele, K normal.  - Voiding well.  - d/c IVF good PO intake.     # Hypercalcemia on Zometa. Last dose of Zometa was 12/7/17, on monthly schedule.  - Due for Zometa in 2 days.      # HCAP & Sinusitis.   # Fevers.  # ID work up.  # Hypogammaglobulinemia.  Streaky R lung base opacity. S/P multiple recurrent hospitalizations. Most recently 12/11-12/21, 12/25-12/30, & now 1/1- current (transfer from OSH). CT sinus + for sinusitis (most prominent in maxillary & sphenoid sinuses. Now with head CT 1/2/18 showing persistent sinusitis (layering paranasal sinus fluid & mucosal thickening).   - Urine & blood CX NTD from OSH. No sputum Cx recorded.   - Recurrent admissions for sepsis/infection (received multiple lines of antibiotics), recurrent pancytopenia 2/2 to therapy, will continue PTA zosyn (good anaerobic coverage in setting of sinusitis). D/C'ed antibiotics (1/4)  - ENT consult: saw mild inflammation, no fungus identified, sinus cultures NTD.  - Sputum culture ordered to be induced by RT (collection pending) - unable to collect.  - Repeat blood culture & UA/UC ordered (NTD, monitor cultures).  - ID consult. Aren't impressed for bacterial infection. Would like to send blood viruses instead (HSV 1&2 PCR (from blood & oral swab), Cryptococcus (negative), Adenovirus, HHV6, CMV PCR (won't let me send b/c someone send IgG)). Also IgG level. RVP.   - D/C antibiotics treat empirically with Valtrex for HSV, concerning lip lesion (awaiting swab HSV result).     # ID PPX. On treatment dose valtrex, doing pentamidine today.      # GERD, hx. Candida esophagitis.  - Continue protonix 40 mg EC BID.      # Hx. Vtach s/p ICD placement.  # Hx. HTN.  - Diltiazem 60 mg BID (PTA) on hold, monitoring BP for sepsis r/t infection. Restarted 1/5/17.   - Remote history of Metoprolol use but not seen in last 2 hospitalization/clinic notes from December.    # Chronic pain 2/2 to MM. Multiple  fractures/lytic lesions.  HOLD PTA MEDS: home oxyCODONE (OXYCONTIN) 10 mg SUSTAINED release tablet Take 10 mg in the AM and 20 mg (2 tablets) in the PM. HYDROmorphone (DILAUDID) 2 mg tablet Take 1 tablet by mouth every 4 hours if needed.  - Consider palliative care consult.  - Denies pain, mentation improving but not at baseline will continue to hold.      # Peripheral neuropathy 2/2 to MM therapy. Hold gabapentin (NEURONTIN) 300 mg capsule Take 1 capsule by mouth 2 times daily.  - Denies pain, mentation improving but not at baseline will continue to hold.     # Major depression. Continue citalopram (CELEXA) 40 mg tablet Take 1 tablet by mouth once daily.     # Weakness/deconditioning 2/2 to multiple hospitalizations, MM/therapy, & chronic disease.  # Chronic fatigue.  - PT/OT consult.   - Recommending TCU.    FEN: Encourage PO fluid intake.   - Regular diet as tolerated.  - No electrolyte replacement r/t CHRISTIANE, hyperkalemia, hyperphosphatemia.    # Mild-Moderate Malnutrition 2/2 to chronic disease/infections, multiple hospitalizations.  - Regular diet, encourage snacks/supplements.  - Juan Jose counts initiated (1/5).    PPX (DVT/GI): protonix BID, mechanical DVT r/t TCP.  LINES/DRAINS: Port.  CONSULTS: Nephrology, ENT, OT, PT, ID.   CODE: Full.  DISPO: CHRISTIANE Improving. Viral work up underway. No fevers 24 hours. OT/PT recommend TCU, social work aware. Plan to possibly dose Betty this stay. Likely TCU early next week.     Cynthia Kyle, [facility], [phone].  Hematology/Oncology, January 6, 2018.    Physical Exam  Constitutional: more sleepy/tired, thinks he's in [place] not [place]. Wife is not present at bedside.   Eyes: PERRL, EOMI, sclera clear, conjunctiva normal.  ENT: very dry mouth, thick secretions, no blood/open lesions. No nasal drainage noted.   Respiratory: Non-labored breathing, good air exchange, but diffuse crackles in R base. No cough on exam.   Cardiovascular: RRR, no murmur noted.  GI: + bowel  sounds, soft, non-distended, NTTP.  Skin: No concerning lesions or rash on exposed areas.  Musculoskeletal: No edema kev LEs.  Neurologic: awake, no focal deficits.  Psych: more flat/sleepy.     Rounding:  Temp:  [96  F (35.6  C)-98.5  F (36.9  C)] 96  F (35.6  C)  Pulse:  [99] 99  Heart Rate:  [] 96  Resp:  [16-18] 16  BP: (129-148)/(77-85) 136/81  SpO2:  [94 %-97 %] 96 %  ----------------------------------  I/O last 3 completed shifts:  In: 1160 [P.O.:900; I.V.:260]  Out: 2070 [Urine:2070]  ----------------------------------  Vitals:    01/03/18 0947 01/04/18 0700 01/05/18 0900   Weight: 80 kg (176 lb 5.9 oz) 78.9 kg (174 lb) 78.2 kg (172 lb 6.4 oz)     Recent Labs   Lab Test  01/06/18   0613  01/05/18   0651  01/04/18   0429  01/03/18   0358  01/02/18   1625   WBC  2.6*  2.8*  2.9*  2.3*  Duplicate request  1.6*   HGB  9.2*  9.6*  7.8*  7.7*  Duplicate request  8.1*   PLT  51*  41*  45*  40*  Duplicate request  35*   NA  139  140  139  141  138   POTASSIUM  4.2  4.0  4.0  5.0  5.0   CHLORIDE  110*  110*  110*  113*  110*   CO2  19*  18*  16*  15*  18*   ANIONGAP  11  12  13  13  10   BUN  24  25  27  30  29   CR  4.38*  4.49*  4.78*  4.73*  4.38*   PARADISE  11.6*  11.4*  10.5*  9.8  10.0   MAG  1.6  1.4*  1.4*  1.6  1.5*   PHOS  6.8*  6.2*  5.3*  7.1*  7.0*   LDH   --    --    --    --   464*   URIC   --   7.0  6.8  7.6*  7.1   BILITOTAL   --   0.4   --    --   0.6   ALKPHOS   --   128   --    --   131   ALT   --   11   --    --   12   AST   --   19   --    --   30   ALBUMIN   --   1.7*   --    --   1.6*         ----------------------------------  No results found for this or any previous visit (from the past 24 hour(s)).  -----------------------------    diltiazem  60 mg Oral BID     sodium chloride 0.9%  250 mL Intravenous Once     diphenhydrAMINE  50 mg Oral Once     acetaminophen  650 mg Oral Once     daratumumab (DARZALEX) infusion  16 mg/kg (Treatment Plan Recorded) Intravenous Once     [START ON  1/7/2018] dexamethasone  4 mg Oral Daily     dexamethasone  20 mg Intravenous Once     valACYclovir  1,000 mg Oral Q24H     sodium chloride (PF)  20 mL Intracatheter Q8H     heparin lock flush  5-10 mL Intracatheter Q24H     heparin  5 mL Intracatheter Q28 Days     citalopram (celeXA) tablet 40 mg  40 mg Oral Daily     pantoprazole  40 mg Oral BID AC       - MEDICATION INSTRUCTIONS -       NaCl       - MEDICATION INSTRUCTIONS -       LORazepam, famotidine, - MEDICATION INSTRUCTIONS -, methylPREDNISolone, diphenhydrAMINE, meperidine, EPINEPHrine, albuterol, albuterol, NaCl, glucose **OR** dextrose **OR** glucagon, sodium chloride (PF), heparin lock flush, sodium chloride (PF), - MEDICATION INSTRUCTIONS -, prochlorperazine **OR** prochlorperazine, ondansetron **OR** ondansetron **OR** [DISCONTINUED] ondansetron, acetaminophen, naloxone          I have seen, interviewed, and examined the patient independently.  I have reviewed the vital signs and labs.  This note reflects my assessment and plan.    51 yo male with Rel/ref MM IgA s/p auto 2014, multiple regiens of chemo, 11/2017 Betty/Pom/Dex with rapid pancytopenia. Chronic, multiple Fxs,     Now adm here with new ARF with Cr to 4.7 (B/L < 1.0) for work up. Renal U/S (uninformative) and renal consult in progress. Very much appreciate their input.   He did get a bit of IV contrast on 12/27. Also on Zosyn which can be nephrotoxic.     Confused and somnolent in am but awake, alert and clear by later in the morning consistent with delirium.     Wife asked if this could be due to withdrawal of opiates. He has been off opiates for several days now. He has no pain at the moment. I do not think this is due to opiate withdrawal. I believe opiates could make this worse. I believe this is delirium due to prolonged hospitalization and complex clinical status.    Afebrile now x48 hrs. Will start daratumumab and Dex 20. No pomalidomide for now. But can consider adding in the future  if his overall clinical status improves. Would need to consider dosing with renal status.    Flaca Guillen MD/PhD

## 2018-01-06 NOTE — PLAN OF CARE
Problem: Patient Care Overview  Goal: Plan of Care/Patient Progress Review  PT 7D:     Discharge Planner PT   Patient plan for discharge: not stated  Current status: pt ambulates 350ft x 2 with FWW and SBA, cues for posture. Pt needing min assist for transfers, some instability noted.  Barriers to return to prior living situation: assist for mobility  Recommendations for discharge: home with 24 hr assist vs TCU  Rationale for recommendations: pt needing assist for transfers, some instability present. Pt would need 24 hr assist to dc home and continuing therapy to improve mobility, would benefit from TCU stay to improve strength, safety, and endurance.       Entered by: Rylee Zimmer 01/06/2018 4:43 PM

## 2018-01-07 LAB
1,3 BETA GLUCAN SER-MCNC: <31 PG/ML
ANION GAP SERPL CALCULATED.3IONS-SCNC: 14 MMOL/L (ref 3–14)
ANISOCYTOSIS BLD QL SMEAR: SLIGHT
B-D GLUCAN INTERPRETATION (1,3): NEGATIVE
BASOPHILS # BLD AUTO: 0.1 10E9/L (ref 0–0.2)
BASOPHILS NFR BLD AUTO: 1.8 %
BUN SERPL-MCNC: 29 MG/DL (ref 7–30)
CA-I SERPL ISE-MCNC: 5.7 MG/DL (ref 4.4–5.2)
CALCIUM SERPL-MCNC: 11.1 MG/DL (ref 8.5–10.1)
CHLORIDE SERPL-SCNC: 109 MMOL/L (ref 94–109)
CO2 SERPL-SCNC: 14 MMOL/L (ref 20–32)
CREAT SERPL-MCNC: 4.26 MG/DL (ref 0.66–1.25)
DIFFERENTIAL METHOD BLD: ABNORMAL
EOSINOPHIL # BLD AUTO: 0 10E9/L (ref 0–0.7)
EOSINOPHIL NFR BLD AUTO: 0.9 %
ERYTHROCYTE [DISTWIDTH] IN BLOOD BY AUTOMATED COUNT: 17.6 % (ref 10–15)
GFR SERPL CREATININE-BSD FRML MDRD: 15 ML/MIN/1.7M2
GLUCOSE SERPL-MCNC: 109 MG/DL (ref 70–99)
HCT VFR BLD AUTO: 27.5 % (ref 40–53)
HGB BLD-MCNC: 8.8 G/DL (ref 13.3–17.7)
LACTATE BLD-SCNC: 1.1 MMOL/L (ref 0.7–2)
LYMPHOCYTES # BLD AUTO: 0.1 10E9/L (ref 0.8–5.3)
LYMPHOCYTES NFR BLD AUTO: 3.5 %
MAGNESIUM SERPL-MCNC: 1.4 MG/DL (ref 1.6–2.3)
MAGNESIUM SERPL-MCNC: 3 MG/DL (ref 1.6–2.3)
MCH RBC QN AUTO: 29.2 PG (ref 26.5–33)
MCHC RBC AUTO-ENTMCNC: 32 G/DL (ref 31.5–36.5)
MCV RBC AUTO: 91 FL (ref 78–100)
METAMYELOCYTES # BLD: 0.1 10E9/L
METAMYELOCYTES NFR BLD MANUAL: 2.6 %
MONOCYTES # BLD AUTO: 0 10E9/L (ref 0–1.3)
MONOCYTES NFR BLD AUTO: 0.9 %
MYELOCYTES # BLD: 0.2 10E9/L
MYELOCYTES NFR BLD MANUAL: 6.1 %
NEUTROPHILS # BLD AUTO: 3.2 10E9/L (ref 1.6–8.3)
NEUTROPHILS NFR BLD AUTO: 84.2 %
PHOSPHATE SERPL-MCNC: 6.4 MG/DL (ref 2.5–4.5)
PLATELET # BLD AUTO: 53 10E9/L (ref 150–450)
PLATELET # BLD EST: ABNORMAL 10*3/UL
POTASSIUM SERPL-SCNC: 4.9 MMOL/L (ref 3.4–5.3)
RBC # BLD AUTO: 3.01 10E12/L (ref 4.4–5.9)
SODIUM SERPL-SCNC: 137 MMOL/L (ref 133–144)
TROPONIN I SERPL-MCNC: <0.015 UG/L (ref 0–0.04)
URATE SERPL-MCNC: 7.2 MG/DL (ref 3.5–7.2)
WBC # BLD AUTO: 3.8 10E9/L (ref 4–11)

## 2018-01-07 PROCEDURE — 84550 ASSAY OF BLOOD/URIC ACID: CPT | Performed by: NURSE PRACTITIONER

## 2018-01-07 PROCEDURE — 36415 COLL VENOUS BLD VENIPUNCTURE: CPT | Performed by: INTERNAL MEDICINE

## 2018-01-07 PROCEDURE — 85025 COMPLETE CBC W/AUTO DIFF WBC: CPT | Performed by: NURSE PRACTITIONER

## 2018-01-07 PROCEDURE — 25000128 H RX IP 250 OP 636: Performed by: INTERNAL MEDICINE

## 2018-01-07 PROCEDURE — 83605 ASSAY OF LACTIC ACID: CPT | Performed by: NURSE PRACTITIONER

## 2018-01-07 PROCEDURE — 84484 ASSAY OF TROPONIN QUANT: CPT | Performed by: INTERNAL MEDICINE

## 2018-01-07 PROCEDURE — 25000131 ZZH RX MED GY IP 250 OP 636 PS 637: Performed by: INTERNAL MEDICINE

## 2018-01-07 PROCEDURE — 25000128 H RX IP 250 OP 636: Performed by: NURSE PRACTITIONER

## 2018-01-07 PROCEDURE — 83735 ASSAY OF MAGNESIUM: CPT | Performed by: INTERNAL MEDICINE

## 2018-01-07 PROCEDURE — 25000125 ZZHC RX 250: Performed by: NURSE PRACTITIONER

## 2018-01-07 PROCEDURE — 25000132 ZZH RX MED GY IP 250 OP 250 PS 637

## 2018-01-07 PROCEDURE — 83735 ASSAY OF MAGNESIUM: CPT | Performed by: NURSE PRACTITIONER

## 2018-01-07 PROCEDURE — 99233 SBSQ HOSP IP/OBS HIGH 50: CPT | Performed by: INTERNAL MEDICINE

## 2018-01-07 PROCEDURE — 12000008 ZZH R&B INTERMEDIATE UMMC

## 2018-01-07 PROCEDURE — 84100 ASSAY OF PHOSPHORUS: CPT | Performed by: NURSE PRACTITIONER

## 2018-01-07 PROCEDURE — 36592 COLLECT BLOOD FROM PICC: CPT | Performed by: INTERNAL MEDICINE

## 2018-01-07 PROCEDURE — 25000132 ZZH RX MED GY IP 250 OP 250 PS 637: Performed by: NURSE PRACTITIONER

## 2018-01-07 PROCEDURE — 82330 ASSAY OF CALCIUM: CPT | Performed by: NURSE PRACTITIONER

## 2018-01-07 PROCEDURE — 80048 BASIC METABOLIC PNL TOTAL CA: CPT | Performed by: NURSE PRACTITIONER

## 2018-01-07 PROCEDURE — 36592 COLLECT BLOOD FROM PICC: CPT | Performed by: NURSE PRACTITIONER

## 2018-01-07 RX ORDER — SODIUM CHLORIDE 9 MG/ML
INJECTION, SOLUTION INTRAVENOUS CONTINUOUS
Status: DISCONTINUED | OUTPATIENT
Start: 2018-01-07 | End: 2018-01-07

## 2018-01-07 RX ORDER — MAGNESIUM SULFATE HEPTAHYDRATE 40 MG/ML
4 INJECTION, SOLUTION INTRAVENOUS EVERY 4 HOURS PRN
Status: DISCONTINUED | OUTPATIENT
Start: 2018-01-07 | End: 2018-01-17 | Stop reason: HOSPADM

## 2018-01-07 RX ORDER — FUROSEMIDE 10 MG/ML
20 INJECTION INTRAMUSCULAR; INTRAVENOUS ONCE
Status: COMPLETED | OUTPATIENT
Start: 2018-01-07 | End: 2018-01-07

## 2018-01-07 RX ORDER — PANTOPRAZOLE SODIUM 40 MG/1
40 TABLET, DELAYED RELEASE ORAL
Status: DISCONTINUED | OUTPATIENT
Start: 2018-01-07 | End: 2018-01-17 | Stop reason: HOSPADM

## 2018-01-07 RX ADMIN — PANTOPRAZOLE SODIUM 40 MG: 40 TABLET, DELAYED RELEASE ORAL at 08:03

## 2018-01-07 RX ADMIN — SODIUM BICARBONATE: 84 INJECTION, SOLUTION INTRAVENOUS at 23:04

## 2018-01-07 RX ADMIN — DILTIAZEM HYDROCHLORIDE 60 MG: 60 TABLET, FILM COATED ORAL at 20:41

## 2018-01-07 RX ADMIN — SODIUM CHLORIDE, PRESERVATIVE FREE 5 ML: 5 INJECTION INTRAVENOUS at 06:25

## 2018-01-07 RX ADMIN — DEXAMETHASONE 4 MG: 4 TABLET ORAL at 08:03

## 2018-01-07 RX ADMIN — CITALOPRAM HYDROBROMIDE 40 MG: 20 TABLET ORAL at 08:03

## 2018-01-07 RX ADMIN — SODIUM CHLORIDE: 9 INJECTION, SOLUTION INTRAVENOUS at 10:31

## 2018-01-07 RX ADMIN — MAGNESIUM SULFATE IN WATER 4 G: 40 INJECTION, SOLUTION INTRAVENOUS at 10:31

## 2018-01-07 RX ADMIN — SODIUM CHLORIDE, PRESERVATIVE FREE 5 ML: 5 INJECTION INTRAVENOUS at 08:04

## 2018-01-07 RX ADMIN — LORAZEPAM 0.5 MG: 2 INJECTION INTRAMUSCULAR; INTRAVENOUS at 23:04

## 2018-01-07 RX ADMIN — DILTIAZEM HYDROCHLORIDE 60 MG: 60 TABLET, FILM COATED ORAL at 08:04

## 2018-01-07 RX ADMIN — SODIUM BICARBONATE: 84 INJECTION, SOLUTION INTRAVENOUS at 13:23

## 2018-01-07 RX ADMIN — FUROSEMIDE 20 MG: 10 INJECTION, SOLUTION INTRAVENOUS at 13:23

## 2018-01-07 RX ADMIN — ACETAMINOPHEN 650 MG: 325 TABLET ORAL at 03:33

## 2018-01-07 RX ADMIN — ONDANSETRON 8 MG: 8 TABLET, ORALLY DISINTEGRATING ORAL at 17:50

## 2018-01-07 ASSESSMENT — PAIN DESCRIPTION - DESCRIPTORS: DESCRIPTORS: ACHING

## 2018-01-07 NOTE — PROGRESS NOTES
Nephrology Progress Note  01/07/2018         Jeffrey Real is a 52 year old male with known relapsed MM on chemo, recent hospital admission for sepsis, discharged to home on 12/30 on a Zpack and readmitted on 1/1/18 for AMS, electrolyte abnormalities and CHRISTIANE.      Interval History :  Remains confused.  Cr unchanged so GFR is 14.  Good UOP.  Has lost 2.5 kg since admission.  Not dizzy.  Good BP. RSV came back positive.  Calcium level is rising again (io calcium 5.8) Has defervesced on antimicrobials and antivirals.     Interval progress notes, imaging studies and labs reviewed     ASSESSMENT AND RECOMMENDATIONS:       1. CHRISTIANE - Creat not changing much.  Remains 4.3 today. Etiology likely precipitation of myeloma protein and contrast agent  in his tubules causing obstruction/ATN. Michel 109 and Fena 16.1 suggesting ATN.    - No indication for RRT , but will continue to monitor closely   - Wife and patient agreeable to RRT if renal function deteriorates   - decrease protonix dose or DC   - rec D5 with 2 amps of bicarb with lasix to keep UOP up (for protein and calcium clearance, also will decrease ionized fraction of calcium).        2. Volume status - Euvolemic.  IVF since admission. No edema, bibasilar rales have resolved.  No hypoxia or dyspnea. Blood pressure is 120/80.     - Standing daily weights   - Strict I/O      3. Electrolytes - low mag (1.4).  Repeat in 12 hours since it may drop further on lasix.      4. Acid base - Bicarb 14. Etiology likely metabolic acidosis 2/2 CHRISTIANE, but blood gasses would confirm.    - rec IV bicarb (see above).  Increasing pH will decrease ionized fraction of calcium      5. BMD - Total calcium is up. Ica 5.1 on 1/2/18 suggests he may not have true hypercalcemia since calcium binds to protein.  He was taking Ca/D at home, but denies additional Ca. Has known h/o lytic lesions. He receives Zometa monthly. Last dose 12/7/17    - Phos 6.8. Etiology CHRISTIANE. From a renal standpoint does not  "need to be treated.    - Repeat ionized calcium    6.  Anemia - known pancytopenia 2/2 MM and chemo. Hgb goal is > 8. Last RBC 1/3   - Transfusions per Oncology      7.  Multiple Myeloma - Getting chemo beginning 1/6. Probable myeloma kidney. Total protein 11.1. Has been in the 10-11 range since 9/17. Albumin however is much lower than usual. Bili and enzymes normal. LD is 464.       9. HTN - B/P 130-150/ following rehydration, HR 90's. On Diltiazem 60 mg bid.       10. Fever - Has defervesced.  RSV positive, which may explain fevers.  Only on Valtrex for now to treat possible HSV infection.        Recommendations were communicated to primary team via pager    Sadia Ayala MD   [phone]     Review of Systems:   I reviewed the following systems:  GI: Eating has improved, but appetite not great.   Neuro:  Cognition worse today per wife.  Constitutional:  afebrile  CV: Denies dyspnea, CP or edema.  : Voiding w/o martinez    Physical Exam:   I/O last 3 completed shifts:  In: 2670 [P.O.:1360; I.V.:1060; IV Piggyback:250]  Out: 500 [Urine:300; Stool:200]   /79  Pulse 99  Temp 97.8  F (36.6  C) (Oral)  Resp 18  Ht 1.778 m (5' 10\")  Wt 77.1 kg (170 lb)  SpO2 96%  BMI 24.39 kg/m2     GENERAL APPEARANCE: Alert, interactive. Wife present  EYES: no scleral icterus, pupils equal  Pulmonary: Rales not present today. Breathing is non labored. Not on supplemental oxygen.   CV: tachy   - Edema - none  GI: soft, nontender, non distended  MS: no evidence of inflammation in joints, no muscle tenderness  : voiding w/o martinez  SKIN: no rash, warm, dry, no cyanosis  NEURO:  confused    Labs:   All labs reviewed by me  Electrolytes/Renal -   Recent Labs   Lab Test  01/07/18   0630  01/06/18   0613  01/05/18   0651   NA  137  139  140   POTASSIUM  4.9  4.2  4.0   CHLORIDE  109  110*  110*   CO2  14*  19*  18*   BUN  29  24  25   CR  4.26*  4.38*  4.49*   GLC  109*  62*  70   PARADISE  11.1*  11.6*  11.4*   MAG  1.4*  1.6  1.4* "   PHOS  6.4*  6.8*  6.2*       CBC -   Recent Labs   Lab Test  01/07/18   0630  01/06/18   0613  01/05/18   0651   WBC  3.8*  2.6*  2.8*   HGB  8.8*  9.2*  9.6*   PLT  53*  51*  41*       LFTs -   Recent Labs   Lab Test  01/05/18   0651  01/02/18   1625   ALKPHOS  128  131   BILITOTAL  0.4  0.6   ALT  11  12   AST  19  30   PROTTOTAL  11.1*  10.7*   ALBUMIN  1.7*  1.6*       Iron Panel - No lab results found.      Imaging:  EXAMINATION: CT CHEST W/O CONTRAST, 1/4/2018 11:18 AM     TECHNIQUE:  Helical CT images from the thoracic inlet through the lung  bases were obtained without IV contrast. Contrast dose: None     COMPARISON: Chest CT 8/8/2017     HISTORY: evaluate PNA further, persistent fevers, NO CONTRAST with MM  & CHRISTIANE;      FINDINGS: Significant motion artifact degrading the quality of the  study as well as low lung volumes.     The central tracheobronchial tree is patent. Basilar predominant  atelectasis. Diffuse mosaic attenuation. Central bronchial wall  thickening. No pneumothorax or pleural effusion. No definite masses or  nodules.      The heart size is normal. Mild calcific atherosclerosis of the left  anterior descending artery. Left chest wall implanted cardiac device.  Right IJ Port-A-Cath with tip in the right atrium. No pericardial  effusion. Normal caliber and configuration of the thoracic great  vessels. Calcified subcarinal lymph node.     Limited views of the abdomen reveal no worrisome pathology. Numerous  subacute to chronic bilateral rib fractures are again noted involving  virtually every rib. No substantial change in the numerous lytic  lesions scattered throughout the thoracic spine and ribs. No  substantial change in the compression deformities of T5 4, T7, T9,  T11, and T12         IMPRESSION:   1. Diffuse mosaic attenuation suggestive of small vessel or small  airway disease.   2. Basilar predominant atelectasis without definite consolidation.  Please note however that this study is  suboptimal due to substantial  respiratory motion.  3. Central bronchial wall thickening consistent with infectious or  inflammatory bronchitis.  4. Numerous subacute to chronic pathologic rib fractures and  compression deformities of the thoracic spine associated lytic lesions  consistent with known multiple myeloma.     I have personally reviewed the examination and initial interpretation  and I agree with the findings.     ESA FENG MD    Current Medications:    pantoprazole  40 mg Oral QAM AC     furosemide  20 mg Intravenous Once     diltiazem  60 mg Oral BID     dexamethasone  4 mg Oral Daily     valACYclovir  1,000 mg Oral Q24H     sodium chloride (PF)  20 mL Intracatheter Q8H     heparin lock flush  5-10 mL Intracatheter Q24H     heparin  5 mL Intracatheter Q28 Days     citalopram (celeXA) tablet 40 mg  40 mg Oral Daily       NaCl 125 mL/hr at 01/07/18 1031     - MEDICATION INSTRUCTIONS -       NaCl       - MEDICATION INSTRUCTIONS -       Sadia Ayala MD

## 2018-01-07 NOTE — PROGRESS NOTES
Hematology / Oncology Progress Note   Date of Service: 01/07/2018     Assessment & Plan Jeffrey Real is a 51 y/o M with complex PMH including refractory IgA Multiple Myeloma s/p autologous transplant 08/2014, most recently on Daralex/Pom/Decadron admitted from OSH r/t CHRISTIANE on CKD, persistent sinusitis, HCAP, concern for sepsis, & altered mental status.     Interval history Continue to be afebrile. Mental status better on my exam AM 1/7. More oriented/conversational. Offers no complaints other than poor ability to sleep overnight. Tolerated Betty well. No rise in glucose with steroids. No chest pain, no SOB, no GIVENS, sounds like it was just pleuritic, troponin was negative. No edema in lower extremities.   - Plan to flush kidneys (& calcium rise) today, with PRN lasix as needed (will pass on in sign out). Wt 77.1 Kg was 80 kg on admission). No crackles on exam.   - Plan to hold off on IgG until Cr better (risk nephrotoxicity with IgG infusion).   - Plan to hold off on bisphosphonate until Cr improved (or later this week, discussed with [facility] & pharmacy).   - Decreased protonix dose ask asked by renal.   - RSV B +. Will just continue supportive care at this time. Especially since patient afebrile & doing well. Confirmed this plan with ID.     # Altered mental status. On admit very lethargic/sleepy on exam. Hyperreflexic. Clonus. Likely multifactorial (kidney injury, long acting pain medications, hypercalcemia, infectious-sinusitis/PNA).  - Head CT w/o contrast shows no evidence of acute intracranial pathology but + sinusitis & mild chronic small vessel ischemic disease.  - Hold oxycodone, dilaudid, ativan & gabapentin r/t sedating effects/CHRISTIANE, not concerned about withdrawal symptoms at this time.   -Initially improved now with more delirium. Initiate delirium precautions.  -Don't check/follow/treat ammonia.      # High Risk/Resistant Multiple Myeloma IgA kappa. S/P autologous peripheral blood stem cell transplant in  08/2014.   # Multiple lytic lesions 2/2 MM (ribs, T7, T9, T11, T12) s/p XRT to thoracolumbar spine.  # Fracture L ischium.  # Hypogammaglobulinemia s/p IVIG replacement.  S/P multiple lines of therapy including auto tx (RVD, auto+melphalan, Velcade Maintenance, VDT-PACE, followed by PCD) complex cytogenetics (17 p deletion, p53 deletion) most recently on pomalidomide/carfilzomib & dex 20+ cycles (started 02/2017) with progression in November 2017 was then switched to daratumumab/pomalidomide & dex.  - Plan was to try to get M-spike < 1 gram% then pursue Allogeneic transplant (saw Yelitza SANDY in clinic 11/27/17). Has brother as haplo-identical donor.  - 12/26/17 found to have pathologic fractures of T2 spinous process & bilateral T1 transverse process. Destructive lytic lesion involving left scapula partially imaged.   - Sachs will discuss Betty/Dex, likely start 1/6/18.  - SPEP & light chains with evidence of MM (see lab section below).      # Pancytopenia 2/2 to MM & related therapy. Pomalyst most recently. Recommended dose decrease with subsequent cycles.  - CBC w differential daily.  - Keep HgB > 8, PLT > 10.      # Acute Kidney Injury/Failure 2/2 to ATN. Cr 4.5 at OSH (up from baseline 0.8-1.1). Likely r/t contrast (got facial bone contrast CT on 12/27 at OSH, + underlying MM) vs. worsening MM vs. Sepsis/infection. No evidence of hypotension in OSH records. Unable to see if he was anuric at OSH.  # Hyperkalemia.  # Hyperphosphatemia.   # Hyperuricemia.  # Chronic kidney disease 2/2 to MM.  # Hx. CHRISTIANE requiring HD from sepsis.   - Avoid nephrotoxins & contrast.  - Consult nephrology.  - BMP daily.  - d/c tele, K normal.  - Voiding well.  - plan to flush kidneys IVF, PRN lasix as needed.      # Hypercalcemia on Zometa. Last dose of Zometa was 12/7/17, on monthly schedule.  - Due for Zometa, holding off in setting of CHRISTIANE will discuss later this week.  - Adding back fluids today.      # HCAP & Sinusitis.   #  Fevers.  # ID work up.  # Hypogammaglobulinemia.  Streaky R lung base opacity. S/P multiple recurrent hospitalizations. Most recently 12/11-12/21, 12/25-12/30, & now 1/1- current (transfer from OSH). CT sinus + for sinusitis (most prominent in maxillary & sphenoid sinuses. Now with head CT 1/2/18 showing persistent sinusitis (layering paranasal sinus fluid & mucosal thickening).   - Urine & blood CX NTD from OSH. No sputum Cx recorded.   - Recurrent admissions for sepsis/infection (received multiple lines of antibiotics), recurrent pancytopenia 2/2 to therapy, will continue PTA zosyn (good anaerobic coverage in setting of sinusitis). D/C'ed antibiotics (1/4)  - ENT consult: saw mild inflammation, no fungus identified, sinus cultures NTD.  - Sputum culture ordered to be induced by RT (collection pending) - unable to collect.  - Repeat blood culture & UA/UC ordered (NTD, monitor cultures).  - ID consult. Aren't impressed for bacterial infection. Would like to send blood viruses instead (see lab section below for results).  - IgG low, needs replacement but waiting for resolving CHRISTIANE.  - RSV B positive, but asymptomatic now & CHRISTIANE.  - D/C antibiotics treat empirically with Valtrex for HSV, concerning lip lesion (awaiting swab HSV result).     # ID PPX. On treatment dose valtrex, s/p penatmidine.     # GERD, hx. Candida esophagitis.  - Continue protonix 40 mg EC BID.      # Hx. Vtach s/p ICD placement.  # Hx. HTN.  - Diltiazem 60 mg BID (PTA) on hold, monitoring BP for sepsis r/t infection. Restarted 1/5/17.   - Remote history of Metoprolol use but not seen in last 2 hospitalization/clinic notes from December.    # Chronic pain 2/2 to MM. Multiple fractures/lytic lesions.  HOLD PTA MEDS: home oxyCODONE (OXYCONTIN) 10 mg SUSTAINED release tablet Take 10 mg in the AM and 20 mg (2 tablets) in the PM. HYDROmorphone (DILAUDID) 2 mg tablet Take 1 tablet by mouth every 4 hours if needed.  - Consider palliative care consult.  -  Denies pain, mentation improving but not at baseline will continue to hold.      # Peripheral neuropathy 2/2 to MM therapy. Hold gabapentin (NEURONTIN) 300 mg capsule Take 1 capsule by mouth 2 times daily.  - Denies pain, mentation improving but not at baseline will continue to hold.     # Major depression. Continue citalopram (CELEXA) 40 mg tablet Take 1 tablet by mouth once daily.     # Weakness/deconditioning 2/2 to multiple hospitalizations, MM/therapy, & chronic disease.  # Chronic fatigue.  - PT/OT consult.   - Recommending TCU.    FEN: Encourage PO fluid intake. Now added back IVF.   - Regular diet as tolerated.  - No electrolyte replacement r/t CHRISTIANE, hyperkalemia, hyperphosphatemia.  - Added mag replacement.    # Mild-Moderate Malnutrition 2/2 to chronic disease/infections, multiple hospitalizations.  - Regular diet, encourage snacks/supplements.  - Juan Jose counts initiated (1/5).    PPX (DVT/GI): protonix daily r/t christiane, mechanical DVT r/t TCP.  LINES/DRAINS: Port.  CONSULTS: Nephrology, ENT, OT, PT, ID.   CODE: Full.  DISPO: CHRISTIANE Improving. Viral work up underway. No fevers >24 hours. OT/PT recommend TCU, social work aware. Plan to possibly dose Betty this stay. Likely TCU early next week.     Cynthia Kyle, [facility], [phone].  Hematology/Oncology, January 7, 2018.    Physical Exam  Constitutional: awake/alert ordering breakfast. More oriented ([facility]).   Eyes: PERRL, EOMI, sclera clear, conjunctiva normal.  ENT: very dry mouth, thick secretions, no blood/open lesions. No nasal drainage noted.   Respiratory: Non-labored breathing, good air exchange, lungs clear. No cough on exam.   Cardiovascular: RRR, no murmur noted.  GI: + bowel sounds, soft, non-distended, NTTP.  Skin: No concerning lesions or rash on exposed areas.  Musculoskeletal: No edema kev LEs.  Neurologic: awake, no focal deficits.  Psych: flat.     Rounding:  Temp:  [96.5  F (35.8  C)-99  F (37.2  C)] 99  F (37.2   C)  Heart Rate:  [] 104  Resp:  [16-20] 18  BP: (109-136)/(67-88) 131/84  SpO2:  [94 %-98 %] 96 %  ----------------------------------  I/O last 3 completed shifts:  In: 2670 [P.O.:1360; I.V.:1060; IV Piggyback:250]  Out: 500 [Urine:300; Stool:200]  ----------------------------------  Vitals:    01/03/18 0947 01/04/18 0700 01/05/18 0900 01/06/18 1043   Weight: 80 kg (176 lb 5.9 oz) 78.9 kg (174 lb) 78.2 kg (172 lb 6.4 oz) 76.4 kg (168 lb 8 oz)    01/07/18 0831   Weight: 77.1 kg (170 lb)     Recent Labs   Lab Test  01/07/18   0630  01/06/18   0613  01/05/18   0651  01/04/18   0429  01/03/18   0358  01/02/18   1625   WBC  3.8*  2.6*  2.8*  2.9*  2.3*  Duplicate request  1.6*   HGB  8.8*  9.2*  9.6*  7.8*  7.7*  Duplicate request  8.1*   PLT  53*  51*  41*  45*  40*  Duplicate request  35*   NA  137  139  140  139  141  138   POTASSIUM  4.9  4.2  4.0  4.0  5.0  5.0   CHLORIDE  109  110*  110*  110*  113*  110*   CO2  14*  19*  18*  16*  15*  18*   ANIONGAP  14  11  12  13  13  10   BUN  29  24  25  27  30  29   CR  4.26*  4.38*  4.49*  4.78*  4.73*  4.38*   PARADISE  11.1*  11.6*  11.4*  10.5*  9.8  10.0   MAG  1.4*  1.6  1.4*  1.4*  1.6  1.5*   PHOS  6.4*  6.8*  6.2*  5.3*  7.1*  7.0*   LDH   --    --    --    --    --   464*   URIC  7.2  7.3*  7.0  6.8  7.6*  7.1   BILITOTAL   --    --   0.4   --    --   0.6   ALKPHOS   --    --   128   --    --   131   ALT   --    --   11   --    --   12   AST   --    --   19   --    --   30   ALBUMIN   --    --   1.7*   --    --   1.6*         ----------------------------------  No results found for this or any previous visit (from the past 24 hour(s)).  -----------------------------    pantoprazole  40 mg Oral QAM AC     diltiazem  60 mg Oral BID     dexamethasone  4 mg Oral Daily     valACYclovir  1,000 mg Oral Q24H     sodium chloride (PF)  20 mL Intracatheter Q8H     heparin lock flush  5-10 mL Intracatheter Q24H     heparin  5 mL Intracatheter Q28 Days     citalopram  (celeXA) tablet 40 mg  40 mg Oral Daily       NaCl       - MEDICATION INSTRUCTIONS -       NaCl       - MEDICATION INSTRUCTIONS -       magnesium sulfate, magnesium sulfate, lidocaine (viscous), LORazepam, famotidine, - MEDICATION INSTRUCTIONS -, methylPREDNISolone, diphenhydrAMINE, meperidine, EPINEPHrine, albuterol, albuterol, NaCl, glucose **OR** dextrose **OR** glucagon, sodium chloride (PF), heparin lock flush, sodium chloride (PF), - MEDICATION INSTRUCTIONS -, prochlorperazine **OR** prochlorperazine, ondansetron **OR** ondansetron **OR** [DISCONTINUED] ondansetron, acetaminophen, naloxone          I have seen, interviewed, and examined the patient independently.  I have reviewed the vital signs and labs.  This note reflects my assessment and plan.    53 yo male with Rel/ref MM IgA s/p auto 2014, multiple regiens of chemo, 11/2017 Betty/Pom/Dex with rapid pancytopenia. Chronic, multiple Fxs,     Now adm here with new ARF with Cr to 4.7 (B/L < 1.0) for work up. Renal U/S (uninformative) and renal consult in progress. Very much appreciate their input.   He did get a bit of IV contrast on 12/27. Also on Zosyn which can be nephrotoxic.       1. Mental status now good, clear  2. Cr not improving much but stable with some Urine output. Will continue to hydrate, Lasix PRN to maintain UOP  3. Tolerated betty well.  4. Zometa given 12/7, due for more bisphophonate, but will hold for a few days until renal issues more stable.   5. Due for IVIG but will hold this until renal issues more stable  6. Pentamadine given 1/5    Flaca Guillen MD/PhD

## 2018-01-07 NOTE — PLAN OF CARE
Problem: Renal Failure/Kidney Injury, Acute (Adult)  Goal: Signs and Symptoms of Listed Potential Problems Will be Absent, Minimized or Managed (Renal Failure/Kidney Injury, Acute)  Signs and symptoms of listed potential problems will be absent, minimized or managed by discharge/transition of care (reference Renal Failure/Kidney Injury, Acute (Adult) CPG).   Outcome: No Change  Afebrile, VSS. Continues to be mildly confused but worse after sleeping. Reports of L flank and chest pain early this morning subsided without intervention. Pain was worse when coughing or deep breathing. Denied nausea, poor/fair appetite. Calorie count sheet updated in room. Up with A1 and walker and ambulating well. Daratumumab infusing to port a cath without complication. Notify MD with status changes and continue with plan of care.

## 2018-01-07 NOTE — PLAN OF CARE
Problem: Patient Care Overview  Goal: Plan of Care/Patient Progress Review  Outcome: Declining  Bed alarm on for safety. Continues to be delirious more frequently; disoriented to time, situation. Oriented to person and place majority of shift. Continue with redirection. AVSS ex trending tachy in low 100's. Afebrile. Lip lesion improved today, no longer bleeding. Tolerating regular diet. Voiding adequately. Up with +1 with walker. Continue monitoring and with POC.

## 2018-01-07 NOTE — PROGRESS NOTES
Calorie Counts  Intake recorded for: 1/6  Kcals: 1115  Protein: 41g  # Meals Recorded: 3 meals (First - 50% fruit loops with 2 milks, orange juice, corn muffin)      (Second - 50% cheeseburger, french fries, corn, milk)      (Third - 50% crumb cod, potato soup, ice cream, lemonade)  # Supplements Recorded: 0

## 2018-01-08 ENCOUNTER — APPOINTMENT (OUTPATIENT)
Dept: OCCUPATIONAL THERAPY | Facility: CLINIC | Age: 53
DRG: 871 | End: 2018-01-08
Attending: INTERNAL MEDICINE
Payer: COMMERCIAL

## 2018-01-08 LAB
ABO + RH BLD: ABNORMAL
ABO + RH BLD: ABNORMAL
ANION GAP SERPL CALCULATED.3IONS-SCNC: 12 MMOL/L (ref 3–14)
ANISOCYTOSIS BLD QL SMEAR: SLIGHT
BACTERIA SPEC CULT: NO GROWTH
BASOPHILS # BLD AUTO: 0 10E9/L (ref 0–0.2)
BASOPHILS NFR BLD AUTO: 0 %
BLD GP AB SCN SERPL QL: ABNORMAL
BLD PROD TYP BPU: NORMAL
BLD UNIT ID BPU: 0
BLOOD BANK CMNT PATIENT-IMP: ABNORMAL
BLOOD BANK CMNT PATIENT-IMP: ABNORMAL
BLOOD BANK CMNT PATIENT-IMP: NORMAL
BLOOD PRODUCT CODE: NORMAL
BPU ID: NORMAL
BUN SERPL-MCNC: 32 MG/DL (ref 7–30)
CA-I SERPL ISE-MCNC: 5.2 MG/DL (ref 4.4–5.2)
CALCIUM SERPL-MCNC: 10.7 MG/DL (ref 8.5–10.1)
CHLORIDE SERPL-SCNC: 100 MMOL/L (ref 94–109)
CO2 SERPL-SCNC: 24 MMOL/L (ref 20–32)
CREAT SERPL-MCNC: 3.88 MG/DL (ref 0.66–1.25)
DIFFERENTIAL METHOD BLD: ABNORMAL
EOSINOPHIL # BLD AUTO: 0 10E9/L (ref 0–0.7)
EOSINOPHIL NFR BLD AUTO: 0 %
ERYTHROCYTE [DISTWIDTH] IN BLOOD BY AUTOMATED COUNT: 17.8 % (ref 10–15)
GFR SERPL CREATININE-BSD FRML MDRD: 16 ML/MIN/1.7M2
GLUCOSE SERPL-MCNC: 113 MG/DL (ref 70–99)
HCT VFR BLD AUTO: 25.6 % (ref 40–53)
HGB BLD-MCNC: 8 G/DL (ref 13.3–17.7)
HSV1 DNA SPEC QL NAA+PROBE: NEGATIVE
HSV2 DNA SPEC QL NAA+PROBE: NEGATIVE
INTERPRETATION ECG - MUSE: NORMAL
KAPPA LC UR-MCNC: 900 MG/DL (ref 0.33–1.94)
KAPPA LC/LAMBDA SER: 1800 {RATIO} (ref 0.26–1.65)
LAMBDA LC SERPL-MCNC: 0.5 MG/DL (ref 0.57–2.63)
LYMPHOCYTES # BLD AUTO: 0.1 10E9/L (ref 0.8–5.3)
LYMPHOCYTES NFR BLD AUTO: 4 %
MAGNESIUM SERPL-MCNC: 2.7 MG/DL (ref 1.6–2.3)
MCH RBC QN AUTO: 29 PG (ref 26.5–33)
MCHC RBC AUTO-ENTMCNC: 31.3 G/DL (ref 31.5–36.5)
MCV RBC AUTO: 93 FL (ref 78–100)
METAMYELOCYTES # BLD: 0 10E9/L
METAMYELOCYTES NFR BLD MANUAL: 1 %
MONOCYTES # BLD AUTO: 0.2 10E9/L (ref 0–1.3)
MONOCYTES NFR BLD AUTO: 6 %
MYELOCYTES # BLD: 0.1 10E9/L
MYELOCYTES NFR BLD MANUAL: 2 %
NEUTROPHILS # BLD AUTO: 2.3 10E9/L (ref 1.6–8.3)
NEUTROPHILS NFR BLD AUTO: 87 %
PHOSPHATE SERPL-MCNC: 6.6 MG/DL (ref 2.5–4.5)
PLATELET # BLD AUTO: 51 10E9/L (ref 150–450)
POTASSIUM SERPL-SCNC: 4 MMOL/L (ref 3.4–5.3)
RBC # BLD AUTO: 2.76 10E12/L (ref 4.4–5.9)
ROULEAUX BLD QL SMEAR: ABNORMAL
SODIUM SERPL-SCNC: 136 MMOL/L (ref 133–144)
SPECIMEN EXP DATE BLD: ABNORMAL
SPECIMEN SOURCE: NORMAL
SPECIMEN SOURCE: NORMAL
TRANSFUSION STATUS PATIENT QL: NORMAL
TRANSFUSION STATUS PATIENT QL: NORMAL
URATE SERPL-MCNC: 6.7 MG/DL (ref 3.5–7.2)
WBC # BLD AUTO: 2.7 10E9/L (ref 4–11)

## 2018-01-08 PROCEDURE — 25000128 H RX IP 250 OP 636: Performed by: INTERNAL MEDICINE

## 2018-01-08 PROCEDURE — 86850 RBC ANTIBODY SCREEN: CPT | Performed by: PHYSICIAN ASSISTANT

## 2018-01-08 PROCEDURE — 84550 ASSAY OF BLOOD/URIC ACID: CPT | Performed by: NURSE PRACTITIONER

## 2018-01-08 PROCEDURE — 36592 COLLECT BLOOD FROM PICC: CPT | Performed by: NURSE PRACTITIONER

## 2018-01-08 PROCEDURE — 86900 BLOOD TYPING SEROLOGIC ABO: CPT | Performed by: NURSE PRACTITIONER

## 2018-01-08 PROCEDURE — 25000132 ZZH RX MED GY IP 250 OP 250 PS 637: Performed by: NURSE PRACTITIONER

## 2018-01-08 PROCEDURE — 82330 ASSAY OF CALCIUM: CPT | Performed by: PHYSICIAN ASSISTANT

## 2018-01-08 PROCEDURE — P9040 RBC LEUKOREDUCED IRRADIATED: HCPCS | Performed by: NURSE PRACTITIONER

## 2018-01-08 PROCEDURE — 86900 BLOOD TYPING SEROLOGIC ABO: CPT | Performed by: PHYSICIAN ASSISTANT

## 2018-01-08 PROCEDURE — 97535 SELF CARE MNGMENT TRAINING: CPT | Mod: GO

## 2018-01-08 PROCEDURE — 83735 ASSAY OF MAGNESIUM: CPT | Performed by: NURSE PRACTITIONER

## 2018-01-08 PROCEDURE — 86850 RBC ANTIBODY SCREEN: CPT | Performed by: NURSE PRACTITIONER

## 2018-01-08 PROCEDURE — 25000132 ZZH RX MED GY IP 250 OP 250 PS 637

## 2018-01-08 PROCEDURE — 25000128 H RX IP 250 OP 636: Performed by: PHYSICIAN ASSISTANT

## 2018-01-08 PROCEDURE — 25000132 ZZH RX MED GY IP 250 OP 250 PS 637: Performed by: PHYSICIAN ASSISTANT

## 2018-01-08 PROCEDURE — 40000133 ZZH STATISTIC OT WARD VISIT

## 2018-01-08 PROCEDURE — 27210995 ZZH RX 272: Performed by: PHYSICIAN ASSISTANT

## 2018-01-08 PROCEDURE — 12000008 ZZH R&B INTERMEDIATE UMMC

## 2018-01-08 PROCEDURE — 86901 BLOOD TYPING SEROLOGIC RH(D): CPT | Performed by: NURSE PRACTITIONER

## 2018-01-08 PROCEDURE — 25000128 H RX IP 250 OP 636: Performed by: NURSE PRACTITIONER

## 2018-01-08 PROCEDURE — 85025 COMPLETE CBC W/AUTO DIFF WBC: CPT | Performed by: NURSE PRACTITIONER

## 2018-01-08 PROCEDURE — 80048 BASIC METABOLIC PNL TOTAL CA: CPT | Performed by: NURSE PRACTITIONER

## 2018-01-08 PROCEDURE — 84100 ASSAY OF PHOSPHORUS: CPT | Performed by: NURSE PRACTITIONER

## 2018-01-08 PROCEDURE — 25000131 ZZH RX MED GY IP 250 OP 636 PS 637: Performed by: INTERNAL MEDICINE

## 2018-01-08 PROCEDURE — 86902 BLOOD TYPE ANTIGEN DONOR EA: CPT | Performed by: NURSE PRACTITIONER

## 2018-01-08 PROCEDURE — 86901 BLOOD TYPING SEROLOGIC RH(D): CPT | Performed by: PHYSICIAN ASSISTANT

## 2018-01-08 PROCEDURE — 36592 COLLECT BLOOD FROM PICC: CPT | Performed by: PHYSICIAN ASSISTANT

## 2018-01-08 PROCEDURE — 99233 SBSQ HOSP IP/OBS HIGH 50: CPT | Performed by: INTERNAL MEDICINE

## 2018-01-08 RX ORDER — DIPHENHYDRAMINE HCL 50 MG
50 CAPSULE ORAL ONCE
Status: COMPLETED | OUTPATIENT
Start: 2018-01-08 | End: 2018-01-08

## 2018-01-08 RX ORDER — ACETAMINOPHEN 325 MG/1
650 TABLET ORAL
Status: ACTIVE | OUTPATIENT
Start: 2018-01-08 | End: 2018-01-09

## 2018-01-08 RX ORDER — DIPHENHYDRAMINE HYDROCHLORIDE 50 MG/ML
50 INJECTION INTRAMUSCULAR; INTRAVENOUS
Status: ACTIVE | OUTPATIENT
Start: 2018-01-08 | End: 2018-01-09

## 2018-01-08 RX ORDER — ACETAMINOPHEN 325 MG/1
650 TABLET ORAL ONCE
Status: COMPLETED | OUTPATIENT
Start: 2018-01-08 | End: 2018-01-08

## 2018-01-08 RX ORDER — ACYCLOVIR 400 MG/1
400 TABLET ORAL 2 TIMES DAILY
Status: DISCONTINUED | OUTPATIENT
Start: 2018-01-08 | End: 2018-01-10

## 2018-01-08 RX ORDER — DIPHENHYDRAMINE HCL 50 MG
50 CAPSULE ORAL
Status: ACTIVE | OUTPATIENT
Start: 2018-01-08 | End: 2018-01-09

## 2018-01-08 RX ORDER — DIPHENHYDRAMINE HYDROCHLORIDE 50 MG/ML
50 INJECTION INTRAMUSCULAR; INTRAVENOUS ONCE
Status: COMPLETED | OUTPATIENT
Start: 2018-01-08 | End: 2018-01-08

## 2018-01-08 RX ORDER — METHYLPREDNISOLONE SODIUM SUCCINATE 125 MG/2ML
125 INJECTION, POWDER, LYOPHILIZED, FOR SOLUTION INTRAMUSCULAR; INTRAVENOUS
Status: ACTIVE | OUTPATIENT
Start: 2018-01-08 | End: 2018-01-09

## 2018-01-08 RX ADMIN — LORAZEPAM 0.5 MG: 2 INJECTION INTRAMUSCULAR; INTRAVENOUS at 21:54

## 2018-01-08 RX ADMIN — IMMUNE GLOBULIN INFUSION (HUMAN) 40 G: 100 INJECTION, SOLUTION INTRAVENOUS; SUBCUTANEOUS at 15:17

## 2018-01-08 RX ADMIN — ACETAMINOPHEN 650 MG: 325 TABLET ORAL at 21:55

## 2018-01-08 RX ADMIN — ACYCLOVIR 400 MG: 400 TABLET ORAL at 19:24

## 2018-01-08 RX ADMIN — SODIUM BICARBONATE: 84 INJECTION, SOLUTION INTRAVENOUS at 14:47

## 2018-01-08 RX ADMIN — PANTOPRAZOLE SODIUM 40 MG: 40 TABLET, DELAYED RELEASE ORAL at 08:21

## 2018-01-08 RX ADMIN — CITALOPRAM HYDROBROMIDE 40 MG: 20 TABLET ORAL at 08:21

## 2018-01-08 RX ADMIN — DIPHENHYDRAMINE HCL 50 MG: 50 CAPSULE ORAL at 14:47

## 2018-01-08 RX ADMIN — DILTIAZEM HYDROCHLORIDE 60 MG: 60 TABLET, FILM COATED ORAL at 08:21

## 2018-01-08 RX ADMIN — SODIUM BICARBONATE: 84 INJECTION, SOLUTION INTRAVENOUS at 08:22

## 2018-01-08 RX ADMIN — ACETAMINOPHEN 650 MG: 325 TABLET, FILM COATED ORAL at 14:47

## 2018-01-08 RX ADMIN — DEXAMETHASONE 4 MG: 4 TABLET ORAL at 08:22

## 2018-01-08 RX ADMIN — DILTIAZEM HYDROCHLORIDE 60 MG: 60 TABLET, FILM COATED ORAL at 19:24

## 2018-01-08 ASSESSMENT — PAIN DESCRIPTION - DESCRIPTORS: DESCRIPTORS: ACHING

## 2018-01-08 NOTE — PLAN OF CARE
Problem: Patient Care Overview  Goal: Plan of Care/Patient Progress Review  Outcome: Declining  Bed alarm on for safety; triggered alarm frequently throughout shift ([id number]). Continues to be delirious; disoriented to place, time, situation. 0.5 mg Ativan given IV x1. Continue with redirection, more difficult to reorient and redirect tonight. Na bicarb IV Cont infusing. AVSS. Afebrile. Denies pain, nausea, SOB, numbness/tingling. Lip lesion no longer bleeding. Calorie Counts; Tolerating regular diet. IV Lasix today, voiding adequately; strict I/O's continued. Up with +1 with walker. Mg replaced today; recheck was scheduled for 2200. Continue monitoring and with POC.

## 2018-01-08 NOTE — PROGRESS NOTES
Calorie Count  Intake recorded for: 1/7  Kcals: 141  Protein: 5g  # Meals Recorded: 50% peaches, ice cream, less than 25% penne pasta with meat sauce   # Supplements Recorded: 0

## 2018-01-08 NOTE — PROGRESS NOTES
[facility]  Transplant Infectious Disease Progress Note     Patient:  Jeffrey Real, Date of birth 1965, Medical record number [id number]  Date of Visit:  01/08/2018    Assessment and Recommendations:     Recommendations:  - Continuing to monitor off antibiotics and antivirals as patient remains afebrile and well appearing    Transplant Infectious Diseases will sign-off. Please call back with any questions or concerns.     Assessment: 51yo man with relapsed IgA kappa multiple myeloma (dx 2013 s/p autologous transplant 8/2014) most recently treated with daratumumab/pomalidomide/dexamethasone (cycle 1 in 11/2017) who was transferred from [facility] to [facility] on 1/2/18 for higher level care in the setting of CHRISTIANE on CKD, sinusitis, pneumonia and altered mental status. Antibiotics were discontinued 1/4 and patient remained clinically well. RVP returned positive for RSV B, but not treated due to contraindications for oral Ribavirin in renal failure. He remains afebrile and saturating well on RA.     Infectious Disease Issues:  1) Non-neutropenic Fever, RSV+ - Afebrile since the morning of 1/4/18. Likely had pneumonia at the time of his second outside hospitalization with improvement in symptoms with treatment. At present he does not have a cough, sputum production, shortness of breath or hypoxemia. His CT chest findings likely represent residual inflammation after his recent PNA. He also does not have symptoms of acute bacterial sinusitis and nasal endoscopy was largely unremarkable. Nasal culture positive for candida albicans/dubliniensis and coagulase negative staph which both likely represent colonization. For these reasons, would continue to observe off therapy. His respiratory viral panel returned positive for RSV and may have been the etiology for his fevers. However, given his improvement off treatment and contraindication of oral Ribavirin with CrCl <50, would not treat.       2) AMS (improved, but continues to wax/wane) - He presented to [facility] with his family on 1/1/18 with AMS, found to have CHRISTIANE, possible ELOY/ATN and hypercalcemia. The AMS is felt likely d/t medications side effects in the setting of renal failure and has improved with holding of meds. He does not have headache or meningismus to suspect a CNS infection, serum cryptococcal ag is negative. No further infectious work-up at this time.     Other Infectious Disease issues include:  - Viral serostatus: CMV-, HSV 1-/2-, VZV+  - Immunization status: due for PCV13  - Gamma globulin status:  on 1/5/18, no need for IVIG from an ID standpoint  - Isolation status: Good hand hygiene.    Katarzyna Youngblood,   Infectious Diseases Fellow  p: [phone]    Attestation:  I have reviewed today's vital signs, medications, labs and imaging.      Floor time: 25 minutes, Face-to-face time: 10 minutes, Total time: 35 minutes  Alex Cheatham,   Pager [phone]  Jeffrey Real was seen in the hospital by Alex Cheatham on Jan 8th with Dr. Youngblood.  I reviewed the history & exam. Assessment and plan were jointly made.  I agree with and have edited the fellow's note and plan of care.  Alex Cheatham MD.     Interval History:     Continues to have waxing and waning mental status with confusion. Experiencing nausea at present, no vomiting. Denies headache, sinus pressure, cough, shortness of breath, diarrhea, dysuria, rashes. Continues to have a sore on the inside of his lower lip which is only mildly bothersome. Last fever was on 1/4 up to 101, since remains afebrile of antibiotics.       Transplants:  Auto-HCT 8/2014 for IgA kappa MM     History of Infectious Disease Illness (copied from initial consult note):      Mr. Real is a 53yo man with relapsed IgA kappa multiple myeloma (dx 2013 s/p autologous transplant 8/2014) most recently treated with daratumumab/pomalidomide/dexamethasone (cycle 1 in 11/2017) who was  transferred from [facility] to [facility] on 1/2/18 for higher level care in the setting of CHRISTIANE on CKD, sinusitis, pneumonia and altered mental status. The patient has had 3 admission in the past month at [facility]: 12/11-12/21 with neutropenic fever, 12/25-12/30 for neutropenic fever, pneumonia, sinusitis after which he was discharged to home on Augmentin BID x5 days, Azithromycin 500mg daily x5 days, then again admitted 1/1-1/2 with progressive weakness. The patient is still confused and unable to give much history. No headache, vision changes, hallucinations, neck pain/stiffness, focal weakness. Per discussions with his wife, daughter and two friends, he had a productive cough and shortness of breath prior to his second admission at [place] [facility]. The symptoms had significantly improved when discharged at that time and have not returned. Denies any facial pain at present, no sinus congestion, rhinorrhea, or pharyngeal pain. He does have an oral ulcer/abrasion on his inferior lip which has been present for 1 week and may be related to accidentally biting the area.      Since being admitted to [facility], Oxycodone, Dilaudid, Ativan and Gabapentin are being held for possible contribution to his AMS, which is now markedly improved. He was switched from Piperacillin-Tazobactam to Meropenem to cover for possible non-responding pneumonia and sinusitis. ENT was consulted and performed a nasal endoscopy and culture 1/3 with mild inflammation of the right nasal cavity without purulent drainage and no findings of necrosis or invasive fungal infection. Infectious Diseases is consulted to assist with management.     Exposure History: Born in [place]. Lives in [place] with his wife, dog and cat. No international travel. 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    Review of Systems:  CONSTITUTIONAL:  No subjective fevers or chills  EYES: negative for icterus or acute vision changes  ENT:   negative for acute hearing loss, sore throat, painful lower lip lesion unchanged  RESPIRATORY:  cough improved, no sputum production or dyspnea  CARDIOVASCULAR:  negative for chest pain, palpitations  GASTROINTESTINAL:  + nausea, no vomiting or diarrhea  GENITOURINARY:  negative for dysuria or hematuria  HEME:  No easy bruising or bleeding  INTEGUMENT:  negative for rash or pruritus  NEURO:  Negative for headache         Current Medications & Allergies:       acetaminophen  650 mg Oral Once     diphenhydrAMINE  50 mg Oral Once    Or     diphenhydrAMINE  50 mg Intravenous Once     immune globulin - sucrose free  40 g Intravenous Once     pantoprazole  40 mg Oral QAM AC     diltiazem  60 mg Oral BID     sodium chloride (PF)  20 mL Intracatheter Q8H     heparin lock flush  5-10 mL Intracatheter Q24H     heparin  5 mL Intracatheter Q28 Days     citalopram (celeXA) tablet 40 mg  40 mg Oral Daily       Infusions/Drips:    IV infusion builder WITH additives       - MEDICATION INSTRUCTIONS -         Allergies   Allergen Reactions     Blood-Group Specific Substance      Other reaction(s): Other - Describe In Comment Field  Patient has a Nonspecific antibody. Blood Product orders may be delayed.  Draw one red top and two purple top tubes for ALL Type and Screen/ Type and Crossmatch orders.     Chlorhexidine      Other reaction(s): Irritation At Patch Site  Itching with Prevantic Swabs     Levofloxacin Rash     Stopped levaquin and rash resolved by 8/8/14            Physical Exam:   Ranges for vital signs:  Temp:  [96.5  F (35.8  C)-97.7  F (36.5  C)] 97.7  F (36.5  C)  Pulse:  [86-95] 95  Heart Rate:  [88-96] 88  Resp:  [18-20] 18  BP: (122-151)/(74-91) 122/75  SpO2:  [95 %-98 %] 97 %  Vitals:    01/06/18 1043 01/07/18 0831 01/08/18 0900   Weight: 76.4 kg (168 lb 8 oz) 77.1 kg (170 lb) 78 kg (172 lb)     Physical Examination:  GENERAL:  Well-developed, well-nourished, laying in bed in no acute distress on RA  HEAD:  Head is  normocephalic, atraumatic.  EYES:  Eyes have anicteric sclerae without conjunctival injection.  ENT:  Oropharynx is moist, right sided ulceration on the mucosa of his lower lip.  NECK:  Supple. No cervical lymphadenopathy.  LUNGS:  faint, diffuse crackles bilaterally  CARDIOVASCULAR:  Regular rate and rhythm with no murmurs, gallops or rubs. ICD in place with no overlaying erythema.  ABDOMEN:  Normal bowel sounds, soft, nontender. No appreciable hepatosplenomegaly.  SKIN:  No acute rashes. R chest port without any surrounding erythema or exudate.  NEUROLOGIC:  Alert and oriented to person and place, slow to date. Memory impaired. Active x4 extremities.         Laboratory Data:       Metabolic Studies       Recent Labs   Lab Test  01/08/18   0629   01/07/18   0630   01/05/18   2043  01/05/18   1146   01/02/18   1830   NA  136   --   137   < >   --    --    < >   --    POTASSIUM  4.0   --   4.9   < >   --    --    < >   --    CHLORIDE  100   --   109   < >   --    --    < >   --    CO2  24   --   14*   < >   --    --    < >   --    ANIONGAP  12   --   14   < >   --    --    < >   --    BUN  32*   --   29   < >   --    --    < >   --    CR  3.88*   --   4.26*   < >   --    --    < >   --    GFRESTIMATED  16*   --   15*   < >   --    --    < >   --    GLC  113*   --   109*   < >   --    --    < >   --    PARADISE  10.7*   --   11.1*   < >   --    --    < >   --    PHOS  6.6*   --   6.4*   < >   --    --    < >   --    MAG  2.7*   < >  1.4*   < >   --    --    < >   --    LACT   --    --   1.1   --   1.5   --    < >   --    PCAL   --    --    --    --    --    --    --   0.80   FGTL   --    --    --    --    --   <31   --    --     < > = values in this interval not displayed.       Hepatic Studies    Recent Labs   Lab Test  01/05/18   0651  01/02/18   1625   BILITOTAL  0.4  0.6   DBIL  0.1   --    ALKPHOS  128  131   PROTTOTAL  11.1*  10.7*   ALBUMIN  1.7*  1.6*   AST  19  30   ALT  11  12   LDH   --   464*       Hematology  Studies      Recent Labs   Lab Test  01/08/18   0629  01/07/18   0630  01/06/18   0613  01/05/18   0651  01/04/18   0429  01/03/18   0358   WBC  2.7*  3.8*  2.6*  2.8*  2.9*  2.3*   ANEU  2.3  3.2  1.9  1.9  1.9  1.6   ALYM  0.1*  0.1*  0.2*  0.2*  0.2*  0.3*   PAM  0.2  0.0  0.2  0.3  0.3  0.4   AEOS  0.0  0.0  0.1  0.2  0.1  0.1   HGB  8.0*  8.8*  9.2*  9.6*  7.8*  7.7*   HCT  25.6*  27.5*  28.9*  29.7*  25.4*  24.5*   PLT  51*  53*  51*  41*  45*  40*       Clotting Studies    Recent Labs   Lab Test  01/02/18   1625   INR  1.38*   PTT  50*       Urine Studies     Recent Labs   Lab Test  01/02/18   1901   URINEPH  6.0   NITRITE  Negative   LEUKEST  Negative   WBCU  1       Microbiology:  Blood cultures  - Portacath 1/2: no growth  - Port & peripheral 1/4: no growth    Sinus Culture 1/3/18 with C.albicans/dubliniensis     Urine culture  - 1/2: no growth     Nasal culture  - 1/3: gram stain negative, culture with light growth CoNS and candida albicans/dubliniensis     Galactomannan Ag - negative  1,3 Beta D glucan - negative  Cryptococcal Ag - negative     Virology:  Respiratory viral panel - positive for RSV B    CMV IgG positive  CMV PCR not detected  Adenovirus PCR not detected  HHV6 PCR not detected  HSV 1/2 PCR plasma not detected     Imaging:  CT Facial Bones w/ IV contrast (from OSH) 12/27/2017  Sinusitis most prominently maxillary and sphenoid sinuses. Facial bones appear intact.     CXR 2 Views (from OSH) 1/1/2018  Diminished lung volumes with bibasilar streaky and patchy opacities.  There is an increase in streaky opacity at the right lung base in the interim.  The previously noted patchy opacity in the right upper lung has resolved.     CT Head w/o Contrast 1/2/2018 Impression:   1. No acute intracranial pathology.  2. Mild chronic small vessel ischemic disease.  3. Layering paranasal sinus fluid and mucosal thickening, which can be  seen in the setting of acute sinusitis.  4. Innumerable lytic lesions  consistent with history of multiple  myeloma.     US Renal 1/3/2018 No hydronephrosis.     CT Chest w/o Contrast 1/4/2018  1. Diffuse mosaic attenuation suggestive of small vessel or small airway disease.   2. Basilar predominant atelectasis without definite consolidation. Please note however that this study is suboptimal due to substantial respiratory motion.  3. Central bronchial wall thickening consistent with infectious or inflammatory bronchitis.  4. Numerous subacute to chronic pathologic rib fractures and compression deformities of the thoracic spine associated lytic lesions consistent with known multiple myeloma.

## 2018-01-08 NOTE — PLAN OF CARE
"Problem: Patient Care Overview  Goal: Plan of Care/Patient Progress Review  OT 7D:  Discharge Planner OT   Patient plan for discharge: did not discuss  Current status: Administered SLUMs with patient scoring 20/30, indicating Dementia-like impairment according to scoring criteria. Pt with impairments most notable in orientation, mental manipulation, attention, and problem solving. Attempted to control environment but score may have slightly been impacted by distractions in room. Pt stating \"I thought it was going to be a lot worse\". Pt educated on implications for safety and need for supervision at home. Pt reminded of spinal precautions both at beginning and end of session, demonstrating poor recall of education despite patient missing only 3/13 possible points related to memory on SLUMs.   Barriers to return to prior living situation: cognition, weakness, need for 24h supervision/assist  Recommendations for discharge: TCU vs home   Rationale for recommendations: Pt reports spouse is available 24/7. Given continued progress with functional transfers and mobility, pt may be appropriate to discharge home. TCU would be beneficial to promote increased strength and endurance for safety.        Entered by: Edda Guan 01/08/2018 12:26 PM           "

## 2018-01-08 NOTE — PROGRESS NOTES
[facility], [place]  Hematology / Oncology Progress Note     Assessment & Plan   Jeffrey Real is a 51 y/o M with complex PMH including refractory IgA Multiple Myeloma s/p autologous transplant 08/2014, most recently on Daralex/Pom/Decadron admitted from OSH r/t CHRISTIANE on CKD, persistent sinusitis, HCAP, concern for sepsis, & altered mental status.      # Encephalopathy. On admit very lethargic/sleepy on exam. Hyperreflexic. Clonus. Likely multifactorial (kidney injury, long acting pain medications, hypercalcemia, infectious-sinusitis/PNA).   - Head CT w/o contrast shows no evidence of acute intracranial pathology but + sinusitis & mild chronic small vessel ischemic disease.  - Hold oxycodone, dilaudid, ativan & gabapentin r/t sedating effects/CHRISTIANE, no withdrawal symptoms at this time.   - Improving over past couple of days. Will ask OT to complete MMSE/cognitive assessment today.  - Consider brain MRI, would need to be able to give contrast.      # High Risk/Resistant Multiple Myeloma IgA kappa. S/P autologous peripheral blood stem cell transplant in 08/2014.   # Multiple lytic lesions 2/2 MM (ribs, T7, T9, T11, T12) s/p XRT to thoracolumbar spine.  # Fracture L ischium.  # Hypogammaglobulinemia s/p IVIG replacement.  S/P multiple lines of therapy including auto tx (RVD, auto+melphalan, Velcade Maintenance, VDT-PACE, followed by PCD) complex cytogenetics (17 p deletion, p53 deletion) most recently on pomalidomide/carfilzomib & dex 20+ cycles (started 02/2017) with progression in November 2017. Most recently switched to daratumumab/pomalidomide/dex.  - Plan was to try to get M-spike < 1 gram% then pursue Allogeneic transplant (saw Dr. Torre in clinic 11/27/17). Has brother as haplo-identical donor.  - 12/26/17 found to have pathologic fractures of T2 spinous process & bilateral T1 transverse process. Destructive lytic lesion involving left scapula (partially imaged).   - S/p dex and  daratumumab on 1/6/18. Given weekly.      # Pancytopenia 2/2 to MM & related therapy. Recommended dose decrease with subsequent cycles.  - Keep HgB > 8, PLT > 10.       # Acute Kidney Injury/Failure 2/2 to ATN. Cr 4.5 at OSH (up from baseline 0.8-1.1). Likely r/t contrast (got facial bone contrast CT on 12/27 at OSH, + underlying MM) vs. worsening MM vs. Sepsis/infection. No evidence of hypotension in OSH records. Unable to see if he was anuric at OSH.  # Hyperkalemia --> RESOLVED  # Hyperphosphatemia.  # Hyperuricemia --> improving  # Chronic kidney disease 2/2 to MM.  # Hx. CHRISTIANE requiring HD from sepsis.   - Avoid nephrotoxins & contrast.  - Nephrology consulted, appreciate their input. Plan to hydrate with 1/2NS and 75mEq bicarb @ 125ml/hr. No Lasix today.  - BMP daily.      # Hypercalcemia on Zometa. Last dose of Zometa was 12/7/17, on monthly schedule.  - Due for Zometa but holding given CHRISTIANE. Ionized calcium lowered to 5.2 today, continue to monitor.      # HCAP & Sinusitis.   # Fever without neutropenia.  # RSV  # Hypogammaglobulinemia.  Streaky R lung base opacity. S/P multiple hospitalizations. Most recently 12/11-12/21, 12/25-12/30, & now 1/1- current (transfer from OSH). CT sinus + for sinusitis. Head CT 1/2/18 showing persistent sinusitis.  - Urine & blood CX NGTD from OSH. Unable to collect sputum culture.   - On Zosyn 1/2-1/4. Abx stopped given positive RSV, and negative cultures.  - Repeat blood culture & UA/UC ordered (NGTD, continue to monitor).  - ID consulted, appreciate their input. No ribavirin given for RSV d/t CHRISTIANE. Infectious workup otherwise negative.  - Give IVIG today (sucrose-free)     # ID PPX. S/p Pentamidine 1/5/17. Acyclovir 400mg BID (would need to be renally adjusted if CrCl drops below 10).      # GERD, hx. Candida esophagitis.  - Protonix 40mg daily.      # Hx. Vtach s/p ICD placement.  # Hx. HTN.  - Diltiazem 60 mg BID (PTA) on hold, monitoring BP for sepsis r/t infection. Restarted  1/5/17.   - Remote history of Metoprolol use but not seen in last 2 hospitalization/clinic notes from December.    # Chronic pain 2/2 to MM. Multiple fractures/lytic lesions.  HOLD PTA MEDS d/t encephalopathy (OxyContin, Dilaudid).   - Consider palliative care consult if any issues with holding meds.  - Denies pain, mentation improving but not at baseline will continue to hold.       # Peripheral neuropathy 2/2 to MM therapy. Hold gabapentin (NEURONTIN) 300 mg capsule Take 1 capsule by mouth 2 times daily.  - Denies pain, mentation improving but not at baseline. Continue to hold.     # Major depression. Continue PTA Celexa.    # Weakness/deconditioning 2/2 to multiple hospitalizations, MM/therapy, & chronic disease.  # Chronic fatigue.  - PT/OT consult.   - TCU vs home with 24h assist. SW aware. Continue to follow recs.     FEN:   - MIVF with 1/2NS and 75mEq bicarb @ 125ml/hr per nephrology  - Follow lytes closely  - Regular diet as tolerated     # Mild-Moderate Malnutrition 2/2 to chronic disease/infections, multiple hospitalizations.  - Regular diet, encourage snacks/supplements.  - Juan Jose counts initiated (1/5).     PPX (DVT/GI): protonix daily, ambulate   LINES/DRAINS: Port.  CONSULTS: Nephrology, ENT, OT, PT, ID.   CODE: Full.  DISPO: Anticipate d/c within next 1-3 days pending need for placement to TCU, improvement in mental status, and SCr trend.    Patient and plan of care discussed with staff attending, Dr. Guillen.  Erna Almeida PA-C  Hematology/Oncology  [phone]    Interval History   David reports feeling pretty good this morning. He is able to tell me the date, year, and the president. He denies having any pain. He states he has been peeing a lot. Eating and drinking ok. No chest pain or SOB. Denies nausea or bowel changes. He is wondering when he might be able to get out of the hospital.    Physical Exam   Temp: 97.7  F (36.5  C) Temp src: Oral BP: 122/75 Pulse: 95 Heart Rate: 88 Resp: 18 SpO2: 97 % O2  Device: None (Room air)    Vitals:    01/06/18 1043 01/07/18 0831 01/08/18 0900   Weight: 76.4 kg (168 lb 8 oz) 77.1 kg (170 lb) 78 kg (172 lb)     Vital Signs with Ranges  Temp:  [96.5  F (35.8  C)-97.7  F (36.5  C)] 97.7  F (36.5  C)  Pulse:  [86-95] 95  Heart Rate:  [88-96] 88  Resp:  [18-20] 18  BP: (122-151)/(74-91) 122/75  SpO2:  [95 %-98 %] 97 %  I/O last 3 completed shifts:  In: 3330 [P.O.:1090; I.V.:2240]  Out: 2150 [Urine:2150]    Constitutional: Awake, alert, cooperative, no apparent distress, and appears stated age. Seen sitting up in chair.  Eyes: Lids and lashes normal, pupils equal, round and reactive to light, extra ocular muscles intact, sclera clear, conjunctiva normal.  ENT: Normocephalic, without obvious abnormality, atraumatic.  Respiratory: No increased work of breathing, good air exchange, clear to auscultation bilaterally, no crackles or wheezing.  Cardiovascular: Regular rate and rhythm, normal S1 and S2, and no murmur noted.  GI: Normal bowel sounds, soft, non-distended, non-tender.  Musculoskeletal: No LE edema bilaterally.  Neuropsychiatric: Calm, normal eye contact, alert, normal affect. Oriented to self, place, time.     Medications     IV infusion builder WITH additives       - MEDICATION INSTRUCTIONS -         acetaminophen  650 mg Oral Once     diphenhydrAMINE  50 mg Oral Once    Or     diphenhydrAMINE  50 mg Intravenous Once     immune globulin - sucrose free  40 g Intravenous Once     acyclovir  400 mg Oral BID     pantoprazole  40 mg Oral QAM AC     diltiazem  60 mg Oral BID     sodium chloride (PF)  20 mL Intracatheter Q8H     heparin lock flush  5-10 mL Intracatheter Q24H     heparin  5 mL Intracatheter Q28 Days     citalopram (celeXA) tablet 40 mg  40 mg Oral Daily       Data   Results for orders placed or performed during the hospital encounter of 01/02/18 (from the past 24 hour(s))   Magnesium   Result Value Ref Range    Magnesium 3.0 (H) 1.6 - 2.3 mg/dL   CBC with platelets  differential   Result Value Ref Range    WBC 2.7 (L) 4.0 - 11.0 10e9/L    RBC Count 2.76 (L) 4.4 - 5.9 10e12/L    Hemoglobin 8.0 (L) 13.3 - 17.7 g/dL    Hematocrit 25.6 (L) 40.0 - 53.0 %    MCV 93 78 - 100 fl    MCH 29.0 26.5 - 33.0 pg    MCHC 31.3 (L) 31.5 - 36.5 g/dL    RDW 17.8 (H) 10.0 - 15.0 %    Platelet Count 51 (L) 150 - 450 10e9/L    Diff Method Manual Differential     % Neutrophils 87.0 %    % Lymphocytes 4.0 %    % Monocytes 6.0 %    % Eosinophils 0.0 %    % Basophils 0.0 %    % Metamyelocytes 1.0 %    % Myelocytes 2.0 %    Absolute Neutrophil 2.3 1.6 - 8.3 10e9/L    Absolute Lymphocytes 0.1 (L) 0.8 - 5.3 10e9/L    Absolute Monocytes 0.2 0.0 - 1.3 10e9/L    Absolute Eosinophils 0.0 0.0 - 0.7 10e9/L    Absolute Basophils 0.0 0.0 - 0.2 10e9/L    Absolute Metamyelocytes 0.0 0 10e9/L    Absolute Myelocytes 0.1 (H) 0 10e9/L    Anisocytosis Slight     Rouleaux Increased    Basic metabolic panel   Result Value Ref Range    Sodium 136 133 - 144 mmol/L    Potassium 4.0 3.4 - 5.3 mmol/L    Chloride 100 94 - 109 mmol/L    Carbon Dioxide 24 20 - 32 mmol/L    Anion Gap 12 3 - 14 mmol/L    Glucose 113 (H) 70 - 99 mg/dL    Urea Nitrogen 32 (H) 7 - 30 mg/dL    Creatinine 3.88 (H) 0.66 - 1.25 mg/dL    GFR Estimate 16 (L) >60 mL/min/1.7m2    GFR Estimate If Black 20 (L) >60 mL/min/1.7m2    Calcium 10.7 (H) 8.5 - 10.1 mg/dL   Uric acid   Result Value Ref Range    Uric Acid 6.7 3.5 - 7.2 mg/dL   Magnesium   Result Value Ref Range    Magnesium 2.7 (H) 1.6 - 2.3 mg/dL   Phosphorus   Result Value Ref Range    Phosphorus 6.6 (H) 2.5 - 4.5 mg/dL   ABO/Rh type and screen   Result Value Ref Range    ABO O     RH(D) Pos     Antibody Screen Pos (A)     Test Valid Only At          [facility]    Specimen Expires 01/11/2018     Blood Bank Comment       Delay in availability of Red Blood Cells called to  DANDY Quintanilla, at 12:10 on 1/8/18.  Sending lab up to collect LULI workup to go to [facility].  1/8/18 SB     Calcium ionized   Result Value Ref Range    Calcium Ionized 5.2 4.4 - 5.2 mg/dL     I have seen, interviewed, and examined the patient independently.  I have reviewed the vital signs and labs.  This note reflects my assessment and plan.    Continues to feel better and is afebrile.    ID: I discussed with Dr. Dale: she is comfortable with work up. Will d/c valtrex, but needs prophy with ACV due to treatment for MM.    Wife is very unhappy that mental status waxes and wanes. He is paranoid at times. I explained that this is consistent with toxic/metabolic encephalopathy (delirium). I do not think MRI brain with contrast is worth the risks at this point, given ARF.    Cr better today. Discussed case with renal. Will follow Ca and consider bisphophanate. He is due for it, but can't give zometa, will likely give pamidronate tomorrow. I discussed situation with renal today. Will likely be ready for d/c in 1-2 days    Will give IVIG today for low IgG    Flaca Guillen MD/PhD

## 2018-01-08 NOTE — PLAN OF CARE
Problem: Patient Care Overview  Goal: Plan of Care/Patient Progress Review  Outcome: No Change  AVSS, afebrile. Reports mild back pain, tolerable, offered Tylenol. Denies nausea. Alert and oriented x1, disoriented to place, situation and time; able to reorient patient. Pt expressed seeing a rare animal outside (unable to elaborate what makes patient thinks there is a rare animal outside), express need to go to a  (reorient patient that it is night time and patient should get some rest), and unable to stay steady with VS regardless of orientation. Delirium checks q4hr. Mild agitation overnight, BA on. Patient only triggered BA twice d/t need to urinate. Pt assisted x1 at bedside to urinal or to toilet. Otherwise, pt slept all night. Calorie counts, didn't eat any foods overnight, no fluids. MIVF infusing via port. Encourage 1106-5756 ml of fluids daily when pt is alert, unable to initiate at night, pt sleeping. No BM. Continue with POC.

## 2018-01-08 NOTE — PLAN OF CARE
Problem: Renal Failure/Kidney Injury, Acute (Adult)  Goal: Signs and Symptoms of Listed Potential Problems Will be Absent, Minimized or Managed (Renal Failure/Kidney Injury, Acute)  Signs and symptoms of listed potential problems will be absent, minimized or managed by discharge/transition of care (reference Renal Failure/Kidney Injury, Acute (Adult) CPG).   Outcome: No Change  Afebrile, VSS. Pt more confused in the morning and was clearer later in the day with his wife and daughter present. Continues to reports L flank muscular pain but improves with repositioning. C/o hiccups and mild nausea improved after prn Zofran. Fair PO intake. IVF started (see EMAR). Good urine output post IV Lasix 20 mg. No acute events. Notify MD with status changes and continue with plan of care.

## 2018-01-08 NOTE — PROGRESS NOTES
Nephrology Progress Note  01/08/2018         Jeffrey Real is a 52 year old male with known relapsed MM on chemo, recent hospital admission for sepsis, discharged to home on 12/30 on a Zpack and readmitted on 1/1/18. Transferred from OSH to [facility] on 1/2/18 for AMS, electrolyte abnormalities and CHRISTIANE.        ASSESSMENT AND RECOMMENDATIONS:       1. CHRISTIANE - Creat declining, down to 3.8 today from 4.2, 4.3. Peak creat 4.78 on 1/4/18. He is non oliguric.   Etiology felt to be ATN given the combination of high protein from Myeloma, hypercalcemia and contrast exposure which likely resulted in precipitation in his tubules causing obstruction/ATN. Michel 109 and Fena 16.1 suggesting ATN.    He does have h/o CHRISTIANE requiring RRT in Feb 2017. He did have a mild CHRISTIANE in Nov with peak creat of 1.6 on 11/25. Unfortunately did not have another creat drawn following the contrast on 12/27/17.   - Will need to be followed closely upon discharge   - Wife and patient agreeable to RRT if renal function deteriorates   - Continue to monitor renal function for recovery with daily chemistry labs   - Avoid nephrotoxins, hypotension   - Renal dose medications      2. Volume status - Appears euvolemic. No edema, hypoxia. Oral intake is marginal. Has been on IVF since admission. Currently on IVF at 125/hr. Blood pressure is 120/. UO 1550 cc + unmeasured over last 24 hrs. Weight today is 78.0 kg. Admission weight 80.0 kg   - Continue IVF at 125/hr   - No need for additional Lasix at this time   - Standing daily weights   - Strict I/O      3. Electrolytes - No acute concerns. K 4.0, Na 136      4. Acid base - Has been acidotic throughout his admission. Bicarb 24 after low of 14 yesterday. Started on IV bicarb yesterday with improvement.     - Will decrease Bicarb to 75 meq today   - Begin 1/2 NS + Bicarb 75 meq at 125 cc/hr today      5. BMD - Hypercalemia is chronic, however, more elevated for several days prior to admission.  Dehydration may have  "been making this worse given improvement with rehydration. Ica 5.1 on 1/2/18. He was taking Ca/D at home, but denies additional Ca. Has known h/o lytic lesions. He receives Zometa monthly. Last dose 12/7/17   - Hypercalcemia can cause CHRISTIANE so will have to monitor closely   - Treatment for hypercalcemia is fluid, so will volume expand with 1/2 NS today     - Recheck ionized Ca in AM ( I have ordered)      6. Anemia - Hgb 8.0. Has known pancytopenia 2/2 MM and chemo. Hgb goal is > 8. Last RBC 1/3   - Transfusions per Oncology      7.  Multiple Myeloma - Per primary team. No h/o Myeloma kidney. Total protein 11.1 on 1/5. Has been in the 10-11 range since 9/17. Albumin however is much lower than usual. Bili and enzymes normal. LD is 464.       9. HTN -Well controlled. /. HR 90's. PTA meds: Diltiazem 60 mg bid.    - Continue Diltiazem w/ hold parameters      10. Sepsis? - Was having fevers. No Source identified. All Cxs NTD. Off all antimicrobials.         Recommendations were communicated to primary team directly      Humaira Crisostomo, NP   [phone]      Seen and discussed with Dr. Vidal      Interval History :       Creat continues to decline  Non oliguric  AMS resolving  Acidosis resolving  Interval progress notes, imaging studies and labs reviewed      Review of Systems:   I reviewed the following systems:  GI: Not eating much. No abdominal pain   Neuro:  Cognition improved and about 75% of baseline per wife  Constitutional:  afebrile  CV: Denies dyspnea, CP or edema.  : Voiding w/o martinez    Physical Exam:   I/O last 3 completed shifts:  In: 3330 [P.O.:1090; I.V.:2240]  Out: 2150 [Urine:2150]   /75  Pulse 95  Temp 97.7  F (36.5  C) (Oral)  Resp 18  Ht 1.778 m (5' 10\")  Wt 78 kg (172 lb)  SpO2 97%  BMI 24.68 kg/m2     GENERAL APPEARANCE: Alert, interactive. EYES: no scleral icterus, pupils equal  Pulmonary: lungs clear to auscultation. Breathing is non labored. Not on supplemental oxygen. "   CV: tachy   - Edema - none  GI: soft, nontender, non distended  MS: no evidence of inflammation in joints, no muscle tenderness  : voiding w/o martinez  SKIN: no rash, warm, dry, no cyanosis  NEURO: Interactive, calm    Labs:   All labs reviewed by me  Electrolytes/Renal -   Recent Labs   Lab Test  01/08/18   0629  01/07/18   2205  01/07/18   0630  01/06/18   0613   NA  136   --   137  139   POTASSIUM  4.0   --   4.9  4.2   CHLORIDE  100   --   109  110*   CO2  24   --   14*  19*   BUN  32*   --   29  24   CR  3.88*   --   4.26*  4.38*   GLC  113*   --   109*  62*   PARADISE  10.7*   --   11.1*  11.6*   MAG  2.7*  3.0*  1.4*  1.6   PHOS  6.6*   --   6.4*  6.8*       CBC -   Recent Labs   Lab Test  01/08/18   0629  01/07/18   0630  01/06/18   0613   WBC  2.7*  3.8*  2.6*   HGB  8.0*  8.8*  9.2*   PLT  51*  53*  51*       LFTs -   Recent Labs   Lab Test  01/05/18   0651  01/02/18   1625   ALKPHOS  128  131   BILITOTAL  0.4  0.6   ALT  11  12   AST  19  30   PROTTOTAL  11.1*  10.7*   ALBUMIN  1.7*  1.6*       Iron Panel - No lab results found.    Current Medications:    acetaminophen  650 mg Oral Once     diphenhydrAMINE  50 mg Oral Once    Or     diphenhydrAMINE  50 mg Intravenous Once     immune globulin - sucrose free  40 g Intravenous Once     pantoprazole  40 mg Oral QAM AC     diltiazem  60 mg Oral BID     sodium chloride (PF)  20 mL Intracatheter Q8H     heparin lock flush  5-10 mL Intracatheter Q24H     heparin  5 mL Intracatheter Q28 Days     citalopram (celeXA) tablet 40 mg  40 mg Oral Daily       IV infusion builder WITH additives 125 mL/hr at 01/08/18 0822     - MEDICATION INSTRUCTIONS -       Humaira Crisostomo NP     Attestation:  This patient has been seen, examined and evaluated by me, Yayo Vidal as a shared visit with the NP/PA above.      In brief:  Jeffrey Real is a 52 year old male with refractory IgA MM s/o autologous txp 8/2014 , currently on Dex + Daratumumab, mulitple lytic lesions (spine /  ribs) , admitted with encephalopathy ( negative CT, multifactorial - pain meds, high calcium, sepsis, CHRISTIANE), CHRISTIANE     Wife reports mentation status is better but still confused. Comfortable. Awake.       Physical exam:  Vitals along with Ins/outs reviewed.    My key findings:  CV: RRR  Edema none  Pulm: clear  bilaterally, no cyanosis or clubbing     Labs -  iCa 5.2   Cr 3.88 < -4.26  Bicarb 24 <- 14    Key management decisions made by me:  Plan:  Continue IVF - change to 1/2 NS + 75 meq Bicarb at 125-150cc/hr. Lasix PRN if signs of volume overload.   Given iCa down to normal range with IVF, no urgency to give bisphosphonate today. If Cr continues to trend down , can consider Pamidronate IV. Resume monthly zometa once CHRISTIANE resolves.     I have reviewed today's vitals and labs.    Yayo Vidal MD   Date of service (date I saw patient) January 8, 2018

## 2018-01-08 NOTE — PLAN OF CARE
Problem: Patient Care Overview  Goal: Plan of Care/Patient Progress Review  7D PT- Cancel, pt busy with RN, getting IV Ig.

## 2018-01-09 ENCOUNTER — APPOINTMENT (OUTPATIENT)
Dept: PHYSICAL THERAPY | Facility: CLINIC | Age: 53
DRG: 871 | End: 2018-01-09
Attending: INTERNAL MEDICINE
Payer: COMMERCIAL

## 2018-01-09 ENCOUNTER — ALLIED HEALTH/NURSE VISIT (OUTPATIENT)
Dept: NEUROLOGY | Facility: CLINIC | Age: 53
DRG: 871 | End: 2018-01-09
Attending: PSYCHIATRY & NEUROLOGY
Payer: COMMERCIAL

## 2018-01-09 DIAGNOSIS — R41.82 ALTERED MENTAL STATUS: Primary | ICD-10-CM

## 2018-01-09 LAB
ALBUMIN SERPL-MCNC: 2 G/DL (ref 3.4–5)
ALP SERPL-CCNC: 122 U/L (ref 40–150)
ALT SERPL W P-5'-P-CCNC: 26 U/L (ref 0–70)
ANION GAP SERPL CALCULATED.3IONS-SCNC: 11 MMOL/L (ref 3–14)
ANISOCYTOSIS BLD QL SMEAR: SLIGHT
AST SERPL W P-5'-P-CCNC: 40 U/L (ref 0–45)
BASOPHILS # BLD AUTO: 0 10E9/L (ref 0–0.2)
BASOPHILS NFR BLD AUTO: 0 %
BILIRUB DIRECT SERPL-MCNC: <0.1 MG/DL (ref 0–0.2)
BILIRUB SERPL-MCNC: 0.4 MG/DL (ref 0.2–1.3)
BUN SERPL-MCNC: 34 MG/DL (ref 7–30)
C DIFF TOX B STL QL: NEGATIVE
CA-I SERPL ISE-MCNC: 5 MG/DL (ref 4.4–5.2)
CALCIUM SERPL-MCNC: 10.1 MG/DL (ref 8.5–10.1)
CHLORIDE SERPL-SCNC: 98 MMOL/L (ref 94–109)
CO2 SERPL-SCNC: 26 MMOL/L (ref 20–32)
CREAT SERPL-MCNC: 3.66 MG/DL (ref 0.66–1.25)
DIFFERENTIAL METHOD BLD: ABNORMAL
EOSINOPHIL # BLD AUTO: 0 10E9/L (ref 0–0.7)
EOSINOPHIL NFR BLD AUTO: 0.9 %
ERYTHROCYTE [DISTWIDTH] IN BLOOD BY AUTOMATED COUNT: 17.1 % (ref 10–15)
GFR SERPL CREATININE-BSD FRML MDRD: 18 ML/MIN/1.7M2
GLUCOSE BLDC GLUCOMTR-MCNC: 80 MG/DL (ref 70–99)
GLUCOSE SERPL-MCNC: 66 MG/DL (ref 70–99)
HCT VFR BLD AUTO: 25.7 % (ref 40–53)
HGB BLD-MCNC: 8.2 G/DL (ref 13.3–17.7)
LYMPHOCYTES # BLD AUTO: 0.1 10E9/L (ref 0.8–5.3)
LYMPHOCYTES NFR BLD AUTO: 3.5 %
MAGNESIUM SERPL-MCNC: 2.2 MG/DL (ref 1.6–2.3)
MCH RBC QN AUTO: 29.6 PG (ref 26.5–33)
MCHC RBC AUTO-ENTMCNC: 31.9 G/DL (ref 31.5–36.5)
MCV RBC AUTO: 93 FL (ref 78–100)
MONOCYTES # BLD AUTO: 0.2 10E9/L (ref 0–1.3)
MONOCYTES NFR BLD AUTO: 7.8 %
MYELOCYTES # BLD: 0 10E9/L
MYELOCYTES NFR BLD MANUAL: 1.7 %
NEUTROPHILS # BLD AUTO: 2 10E9/L (ref 1.6–8.3)
NEUTROPHILS NFR BLD AUTO: 86.1 %
NRBC # BLD AUTO: 0 10*3/UL
NRBC BLD AUTO-RTO: 1 /100
PHOSPHATE SERPL-MCNC: 4.9 MG/DL (ref 2.5–4.5)
PLATELET # BLD AUTO: 46 10E9/L (ref 150–450)
PLATELET # BLD EST: ABNORMAL 10*3/UL
POTASSIUM SERPL-SCNC: 3.9 MMOL/L (ref 3.4–5.3)
PROT SERPL-MCNC: 11.2 G/DL (ref 6.8–8.8)
RBC # BLD AUTO: 2.77 10E12/L (ref 4.4–5.9)
SODIUM SERPL-SCNC: 134 MMOL/L (ref 133–144)
SPECIMEN SOURCE: NORMAL
URATE SERPL-MCNC: 6.5 MG/DL (ref 3.5–7.2)
WBC # BLD AUTO: 2.3 10E9/L (ref 4–11)

## 2018-01-09 PROCEDURE — 99233 SBSQ HOSP IP/OBS HIGH 50: CPT | Performed by: INTERNAL MEDICINE

## 2018-01-09 PROCEDURE — 97116 GAIT TRAINING THERAPY: CPT | Mod: GP

## 2018-01-09 PROCEDURE — 83735 ASSAY OF MAGNESIUM: CPT | Performed by: NURSE PRACTITIONER

## 2018-01-09 PROCEDURE — 93010 ELECTROCARDIOGRAM REPORT: CPT | Performed by: INTERNAL MEDICINE

## 2018-01-09 PROCEDURE — 84550 ASSAY OF BLOOD/URIC ACID: CPT | Performed by: NURSE PRACTITIONER

## 2018-01-09 PROCEDURE — 25000132 ZZH RX MED GY IP 250 OP 250 PS 637

## 2018-01-09 PROCEDURE — 12000003 ZZH R&B CRITICAL UMMC

## 2018-01-09 PROCEDURE — 93005 ELECTROCARDIOGRAM TRACING: CPT

## 2018-01-09 PROCEDURE — 25000132 ZZH RX MED GY IP 250 OP 250 PS 637: Performed by: PHYSICIAN ASSISTANT

## 2018-01-09 PROCEDURE — 40000193 ZZH STATISTIC PT WARD VISIT

## 2018-01-09 PROCEDURE — 85025 COMPLETE CBC W/AUTO DIFF WBC: CPT | Performed by: NURSE PRACTITIONER

## 2018-01-09 PROCEDURE — 87493 C DIFF AMPLIFIED PROBE: CPT | Performed by: PHYSICIAN ASSISTANT

## 2018-01-09 PROCEDURE — 27210995 ZZH RX 272: Performed by: PHYSICIAN ASSISTANT

## 2018-01-09 PROCEDURE — 95951 ZZHC EEG VIDEO < 12 HR: CPT | Mod: 52,ZF

## 2018-01-09 PROCEDURE — 80048 BASIC METABOLIC PNL TOTAL CA: CPT | Performed by: NURSE PRACTITIONER

## 2018-01-09 PROCEDURE — 84100 ASSAY OF PHOSPHORUS: CPT | Performed by: NURSE PRACTITIONER

## 2018-01-09 PROCEDURE — 82330 ASSAY OF CALCIUM: CPT | Performed by: NURSE PRACTITIONER

## 2018-01-09 PROCEDURE — 36592 COLLECT BLOOD FROM PICC: CPT | Performed by: NURSE PRACTITIONER

## 2018-01-09 PROCEDURE — 25000128 H RX IP 250 OP 636: Performed by: INTERNAL MEDICINE

## 2018-01-09 PROCEDURE — 80076 HEPATIC FUNCTION PANEL: CPT | Performed by: NURSE PRACTITIONER

## 2018-01-09 PROCEDURE — 25000132 ZZH RX MED GY IP 250 OP 250 PS 637: Performed by: NURSE PRACTITIONER

## 2018-01-09 PROCEDURE — 25000128 H RX IP 250 OP 636: Performed by: PHYSICIAN ASSISTANT

## 2018-01-09 PROCEDURE — 97530 THERAPEUTIC ACTIVITIES: CPT | Mod: GP

## 2018-01-09 PROCEDURE — 00000146 ZZHCL STATISTIC GLUCOSE BY METER IP

## 2018-01-09 RX ORDER — HALOPERIDOL 5 MG/ML
0.5 INJECTION INTRAMUSCULAR EVERY 6 HOURS PRN
Status: DISCONTINUED | OUTPATIENT
Start: 2018-01-09 | End: 2018-01-10

## 2018-01-09 RX ORDER — LOPERAMIDE HCL 2 MG
2-4 CAPSULE ORAL 4 TIMES DAILY PRN
Status: DISCONTINUED | OUTPATIENT
Start: 2018-01-09 | End: 2018-01-17 | Stop reason: HOSPADM

## 2018-01-09 RX ORDER — OXYCODONE HYDROCHLORIDE 5 MG/1
5-10 TABLET ORAL EVERY 6 HOURS PRN
Status: DISCONTINUED | OUTPATIENT
Start: 2018-01-09 | End: 2018-01-16

## 2018-01-09 RX ORDER — HALOPERIDOL 5 MG/ML
1-2 INJECTION INTRAMUSCULAR ONCE
Status: DISCONTINUED | OUTPATIENT
Start: 2018-01-09 | End: 2018-01-09

## 2018-01-09 RX ORDER — HYDROMORPHONE HYDROCHLORIDE 1 MG/ML
.3-.5 INJECTION, SOLUTION INTRAMUSCULAR; INTRAVENOUS; SUBCUTANEOUS ONCE
Status: DISCONTINUED | OUTPATIENT
Start: 2018-01-09 | End: 2018-01-09

## 2018-01-09 RX ORDER — HALOPERIDOL 5 MG/ML
0.5 INJECTION INTRAMUSCULAR ONCE
Status: COMPLETED | OUTPATIENT
Start: 2018-01-09 | End: 2018-01-09

## 2018-01-09 RX ADMIN — DILTIAZEM HYDROCHLORIDE 60 MG: 60 TABLET, FILM COATED ORAL at 08:38

## 2018-01-09 RX ADMIN — HALOPERIDOL LACTATE 0.5 MG: 5 INJECTION, SOLUTION INTRAMUSCULAR at 18:49

## 2018-01-09 RX ADMIN — HALOPERIDOL LACTATE 0.5 MG: 5 INJECTION, SOLUTION INTRAMUSCULAR at 11:08

## 2018-01-09 RX ADMIN — CITALOPRAM HYDROBROMIDE 40 MG: 20 TABLET ORAL at 08:38

## 2018-01-09 RX ADMIN — SODIUM BICARBONATE: 84 INJECTION, SOLUTION INTRAVENOUS at 06:28

## 2018-01-09 RX ADMIN — ACYCLOVIR 400 MG: 400 TABLET ORAL at 08:38

## 2018-01-09 RX ADMIN — SODIUM BICARBONATE: 84 INJECTION, SOLUTION INTRAVENOUS at 16:33

## 2018-01-09 RX ADMIN — OXYCODONE HYDROCHLORIDE 10 MG: 5 TABLET ORAL at 20:14

## 2018-01-09 RX ADMIN — LORAZEPAM 0.5 MG: 2 INJECTION INTRAMUSCULAR; INTRAVENOUS at 20:42

## 2018-01-09 RX ADMIN — PANTOPRAZOLE SODIUM 40 MG: 40 TABLET, DELAYED RELEASE ORAL at 08:38

## 2018-01-09 RX ADMIN — LORAZEPAM 0.5 MG: 2 INJECTION INTRAMUSCULAR; INTRAVENOUS at 04:48

## 2018-01-09 RX ADMIN — OXYCODONE HYDROCHLORIDE 5 MG: 5 TABLET ORAL at 14:02

## 2018-01-09 RX ADMIN — ACYCLOVIR 400 MG: 400 TABLET ORAL at 20:07

## 2018-01-09 RX ADMIN — DILTIAZEM HYDROCHLORIDE 60 MG: 60 TABLET, FILM COATED ORAL at 20:07

## 2018-01-09 RX ADMIN — OXYCODONE HYDROCHLORIDE 5 MG: 5 TABLET ORAL at 15:37

## 2018-01-09 ASSESSMENT — PAIN DESCRIPTION - DESCRIPTORS
DESCRIPTORS: ACHING;DISCOMFORT

## 2018-01-09 NOTE — PLAN OF CARE
Problem: Patient Care Overview  Goal: Plan of Care/Patient Progress Review  Outcome: No Change  Assumed cares [id number]. Pt alert to self. VSS on RA. Denies pain or nausea, tolerating reg diet. On calorie counts. Encouraging pt to drink more fluids. Pt presents w/delirium, stating there are helicopters around the buildings. Delirium checks q4. Hgb 8.0. plan for blood transfusion this evening. IVIG started at 1520 30mins after premeds, completed at 1840. VS and dose increases noted in MAR/flowsheet. Blood prepare order sent, oncoming RN to release blood tonight. Port-a-cath to R upper chest, site re-accessed and dressing changed today prior to IVIG. Sm amt bleeding noted at site. Wife at bedside. BA when family not present. Pt up SBA w/cane. Voids well, last BM today. Plan to monitor kidney function and delirium status. Continue to monitor and w/POC.       Problem: Renal Failure/Kidney Injury, Acute (Adult)  Goal: Signs and Symptoms of Listed Potential Problems Will be Absent, Minimized or Managed (Renal Failure/Kidney Injury, Acute)  Signs and symptoms of listed potential problems will be absent, minimized or managed by discharge/transition of care (reference Renal Failure/Kidney Injury, Acute (Adult) CPG).   Outcome: No Change   01/08/18 1845   Renal Failure/Kidney Injury, Acute   Problems Assessed (Acute Renal Failure/Kidney Injury) all   Problems Present (ARF/Kidney Injury) situational response

## 2018-01-09 NOTE — PROGRESS NOTES
Calorie Counts    Intake recorded for: 1/8 Kcals: 922 Protein: 40g    # Meals recorded: 3 meals (First: 100% white toast, 75% jelly, 50% hash browns, butter, 25% orange juice, less than 25% breakfast sandwich, peaches)      (Second: 25% dinner roll, hot turkey sandwich w/ gravy, corn, potatoes w/ gravy)      (Third: 100% whole milk, 75% crumb cod, 50% green beans, 25% banana)    # Supplements recorded: 75% Ensure Clear

## 2018-01-09 NOTE — PROGRESS NOTES
Nephrology Progress Note  01/09/2018       Jeffrey Real is a 52 year old male with known relapsed MM on chemo, recent hospital admission for sepsis, discharged to home on 12/30 on a Zpack and readmitted on 1/1/18. Transferred from OSH to [facility] on 1/2/18 for AMS, electrolyte abnormalities and CHRISTIANE.        ASSESSMENT AND RECOMMENDATIONS:       1. CHRISTIANE - Creat declining, down to 3.6 today from 3.8,  4.2, 4.3. Peak creat 4.78 on 1/4/18. He is non oliguric.   Etiology felt to be ATN given the combination of high protein from Myeloma, hypercalcemia and contrast exposure which likely resulted in precipitation in his tubules causing obstruction/ATN. Michel 109 and Fena 16.1 suggesting ATN.    He does have h/o CHRISTIANE requiring RRT in Feb 2017. He did have a mild CHRISTIANE in Nov with peak creat of 1.6 on 11/25. Unfortunately did not have another creat drawn following the contrast on 12/27/17.   - Will need to be followed closely upon discharge   - Wife and patient agreeable to RRT if renal function deteriorates   - Continue to monitor renal function for recovery with daily chemistry labs   - Avoid nephrotoxins, hypotension   - Renal dose medications      2. Volume status - Appears euvolemic. No edema, hypoxia. Oral intake is marginal. Has been on IVF since admission. Currently on IVF at 125/hr. Blood pressure is 130/. UO 2300 cc + unmeasured over last 24 hrs. Had 5+ liquid stools overnight and this morning. Weight today is 75.7 kg. Admission weight 80.0 kg   - Continue IVF at 125/hr. If we stop his Ca will increase   - No need for additional Lasix at this time   - Standing daily weights   - Strict I/O      3. Electrolytes - No acute concerns. K 3.9, Na 134      4. Acid base - Had been acidotic throughout his admission. Bicarb 26 today while getting IV Bicarb. Now he also has diarrhea.      - Continue 1/2 NS + Bicarb 75 meq at 125 cc/hr      5. BMD - Hypercalemia is chronic, however, more elevated for several days prior to admission.   Dehydration may have been making this worse given improvement with rehydration. Ica 5.0 today with ongoing IVF. He was taking Ca/D at home, but denies additional Ca. Has known h/o lytic lesions. He receives Zometa monthly. Last dose 12/7/17   - OK to give one dose of Pamidronate 60 mg before discharge with close f/u, however, if he remains in the hospital would just continue the IVF and hold on the pamidronate until closer to d/c. If the IVF is stopped his calcium is going to rise again which is contributing to is CHRISTIANE.    -  Would not give Zometa until renal function normalizes given long duration of action and risk for jaw osteonecrosis.    - Phos 4.9      6. Anemia - Hgb 8.2. Has known pancytopenia 2/2 MM and chemo. Hgb goal is > 8. Last RBC 1/8   - Transfusions per Oncology      7.  Multiple Myeloma - Per primary team. No h/o Myeloma kidney. Total protein 11.1 on 1/5. Has been in the 10-11 range since 9/17. Albumin however is much lower than usual. Bili and enzymes normal. LD is 464. Uric acid  6.5      9. HTN -Well controlled. /. HR 70-80's. PTA meds: Diltiazem 60 mg bid.    - Continue Diltiazem w/ hold parameters      10. Sepsis? - Was having fevers. No Source identified. All Cxs NTD. Back on Acyclovir 1/8        Recommendations were communicated to primary team via progress note      Humaira Crisostomo, NP   [phone]      Seen and discussed with Dr. Vidal      Interval History :       Creat continues to decline  Non oliguric  Having loose stools overnight  Slept poorly  Wife confirms patient remains confused  Interval progress notes, imaging studies and labs reviewed      Review of Systems:   I reviewed the following systems:  GI: Not eating much. No abdominal pain. Having loose stools  Neuro:  Cognition worse again. Hallucinating and confused  Constitutional:  afebrile  CV: Denies dyspnea, CP or edema.  : Voiding w/o martinez    Physical Exam:   I/O last 3 completed shifts:  In: 2250.5 [P.O.:480;  "I.V.:1770.5]  Out: 2000 [Urine:2000]   /77  Pulse 76  Temp 96.8  F (36  C) (Oral)  Resp 18  Ht 1.778 m (5' 10\")  Wt 75.7 kg (166 lb 14.4 oz)  SpO2 98%  BMI 23.95 kg/m2     GENERAL APPEARANCE: Lethargic, Wife present  EYES: no scleral icterus, pupils equal  Pulmonary: lungs clear to auscultation. Breathing is non labored. Not on supplemental oxygen.   CV: RRR   - Edema - none  GI: soft, nontender, non distended  MS: no evidence of inflammation in joints, no muscle tenderness  : voiding w/o martinez  SKIN: Face looks red and rashy  NEURO: Lethargic, more altered today    Labs:   All labs reviewed by me  Electrolytes/Renal -   Recent Labs   Lab Test  01/09/18   0536  01/08/18   0629  01/07/18   2205  01/07/18   0630   NA  134  136   --   137   POTASSIUM  3.9  4.0   --   4.9   CHLORIDE  98  100   --   109   CO2  26  24   --   14*   BUN  34*  32*   --   29   CR  3.66*  3.88*   --   4.26*   GLC  66*  113*   --   109*   PARADISE  10.1  10.7*   --   11.1*   MAG  2.2  2.7*  3.0*  1.4*   PHOS  4.9*  6.6*   --   6.4*       CBC -   Recent Labs   Lab Test  01/09/18   0536  01/08/18   0629  01/07/18   0630   WBC  2.3*  2.7*  3.8*   HGB  8.2*  8.0*  8.8*   PLT  46*  51*  53*       LFTs -   Recent Labs   Lab Test  01/05/18   0651  01/02/18   1625   ALKPHOS  128  131   BILITOTAL  0.4  0.6   ALT  11  12   AST  19  30   PROTTOTAL  11.1*  10.7*   ALBUMIN  1.7*  1.6*       Iron Panel - No lab results found.    Current Medications:    acyclovir  400 mg Oral BID     pantoprazole  40 mg Oral QAM AC     diltiazem  60 mg Oral BID     sodium chloride (PF)  20 mL Intracatheter Q8H     heparin lock flush  5-10 mL Intracatheter Q24H     heparin  5 mL Intracatheter Q28 Days     citalopram (celeXA) tablet 40 mg  40 mg Oral Daily       IV infusion builder WITH additives 125 mL/hr at 01/09/18 0628     - MEDICATION INSTRUCTIONS -       Humaira Crisostomo, NP  "

## 2018-01-09 NOTE — PLAN OF CARE
Problem: Patient Care Overview  Goal: Plan of Care/Patient Progress Review    OT-7d: Cx- attempted to see pt this PM but pt with another provider.

## 2018-01-09 NOTE — PLAN OF CARE
Problem: Patient Care Overview  Goal: Plan of Care/Patient Progress Review  Outcome: No Change  9098-8605: AVSS on RA. Complaints of lower back/butt pain managed with tylenol x1, encouraged frequent position changes. Received 1 unit PRBCs, no transfusion reaction noted. A&Ox4, continues to have hallucinations of a white truck outside his window. Denies nausea, appetite fair. Wife at bedside, bed alarm off while she is here. Gave 0.5 mg IV ativan x1 per pt request. Up with SBA + cane. Voiding adequately. Continue to encourage PO fluids. Calorie counts through tonight. Continue with POC.

## 2018-01-09 NOTE — PROGRESS NOTES
[facility], [place]  Hematology / Oncology Progress Note     Assessment & Plan   Jeffrey Real is a 53 y/o M with complex PMH including refractory IgA Multiple Myeloma s/p autologous transplant 08/2014, most recently on Daralex/Pom/Decadron (started 11/17), transferred from OSH d/t CHRISTIANE on CKD, persistent sinusitis, HCAP, concern for sepsis, & altered mental status.      # Delirium vs encephalopathy. On admit very lethargic/sleepy on exam. Hyperreflexic. Clonus. Likely multifactorial (kidney injury, long acting pain medications, hypercalcemia, infectious-sinusitis/PNA). Mental status waxing/waning over past few days.  - Head CT w/o contrast shows no evidence of acute intracranial pathology but + sinusitis & mild chronic small vessel ischemic disease.  - Held PTA oxycodone, dilaudid, ativan & gabapentin d/t potential sedating effects/CHRISTIANE, no withdrawal symptoms. Given reports of pain and some agitation today, will add back oxycodone 5-10mg every 4h as needed.  - Waxing and waning. More agitated this AM. D/c PRN Ativan. QTc 455 this AM, gave one-time dose Haldol IV 0.5 mg which helped slightly. Will make available q6h PRN keeping in mind slightly prolonged QT interval.  - SLUMs score 20/30 per OT (abnormal)  - Neurology consulted, appreciate their input and recommendations. Will obtain video EEG to r/o subclinical seizures as etiology.      # High Risk/Resistant Multiple Myeloma IgA kappa. S/P autologous peripheral blood stem cell transplant in 08/2014.   # Multiple lytic lesions 2/2 MM (ribs, T7, T9, T11, T12) s/p XRT to thoracolumbar spine.  # Fracture L ischium.  # Hypogammaglobulinemia s/p IVIG replacement.  S/P multiple lines of therapy including auto tx (RVD, auto+melphalan, Velcade Maintenance, VDT-PACE, followed by PCD) complex cytogenetics (17 p deletion, p53 deletion) most recently on pomalidomide/carfilzomib & dex 20+ cycles (started 02/2017) with progression in November 2017.  Most recently switched to daratumumab/pomalidomide/dex.  - Plan was to try to get M-spike < 1 gram% then pursue Allogeneic transplant (saw Dr. Torre in clinic 11/27/17). Has brother as haplo-identical donor.  - 12/26/17 found to have pathologic fractures of T2 spinous process & bilateral T1 transverse process. Destructive lytic lesion involving left scapula (partially imaged).   - S/p dex and daratumumab on 1/6/18. Given weekly.      # Pancytopenia 2/2 to MM & related therapy. Recommended dose decrease with subsequent cycles.  - Keep HgB > 8, PLT > 10.       # Acute Kidney Injury/Failure 2/2 to ATN. Cr 4.5 at OSH (up from baseline 0.8-1.1). Likely r/t contrast (got facial bone contrast CT on 12/27 at OSH, + underlying MM) vs. worsening MM vs. Sepsis/infection. No evidence of hypotension in OSH records. Unable to see if he was anuric at OSH.  # Hyperkalemia --> RESOLVED  # Hyperphosphatemia --> improving  # Hyperuricemia --> improving  # Chronic kidney disease 2/2 to MM.  # Hx. CHRISTIANE requiring HD from sepsis.  - Avoid nephrotoxins, contrast, renally dose medications.  - Nephrology consulted, appreciate their input. Continue hydration with 1/2NS and 75mEq bicarb @ 125ml/hr. No Lasix unless signs/symptoms of fluid overload.  - BMP daily.      # Hypercalcemia. Last dose of Zometa was 12/7/17, on monthly schedule.  - Due for Zometa but holding given CHRISTIANE. Ionized calcium normalized, 5.0 today. Continue to follow.     #Hypoglycemia.  -Noted intermittently. Reviewed records from OSH through [facility]where, appears to have occurred PTA as well. ? Due to CHRISTIANE. Continue to monitor, hypoglycemia protocol in place. Consider endocrinology consult.    #Prolonged QTc  -Level on admission at 471. Repeat EKG 1/9 is 455. D/c'd Zofran, using Haldol with caution. Past EKGs show interval normal 8/2017, prolonged on 12/25.      # HCAP & Sinusitis.   # Fever without neutropenia.  # RSV  # Hypogammaglobulinemia.  Streaky R lung base  opacity. S/P multiple hospitalizations. Most recently 12/11-12/21, 12/25-12/30, & now 1/1- current (transfer from OSH). CT sinus + for sinusitis. Head CT 1/2/18 showing persistent sinusitis.  - Urine & blood CX NGTD from OSH. Unable to collect sputum culture.   - On Zosyn 1/2-1/4. Abx stopped given positive RSV, and negative cultures.  - Repeat blood culture & UA/UC ordered (NGTD, continue to monitor).  - ID consulted, appreciate their input. No ribavirin given for RSV d/t CHRISTIANE. Infectious workup otherwise negative.  - Given sucrose-free IVIG on 1/8.     # ID PPX. S/p Pentamidine 1/5/17. Acyclovir 400mg BID (would need to be renally adjusted if CrCl drops below 10).      # GERD, hx. Candida esophagitis.  - Protonix 40mg daily.      # Hx. Vtach s/p ICD placement.  # Hx. HTN.  - Diltiazem 60 mg BID initially held due to need for close monitoring of BPs for possible sepsis. Restarted 1/5/17, BPs stable.   - Remote history of Metoprolol use but not seen in last 2 hospitalization/clinic notes from December.    # Chronic pain 2/2 to MM. Multiple fractures/lytic lesions.  Held PTA meds d/t encephalopathy (OxyContin, Dilaudid).   - Concern for pain to legs/back today as above. Started PRN oxycodone per above.      # Peripheral neuropathy 2/2 to MM therapy. Holding PTA gabapentin.  - Continue to hold at this time.    # Major depression. Continue PTA Celexa.    # Weakness/deconditioning 2/2 to multiple hospitalizations, MM/therapy, & chronic disease.  # Chronic fatigue.  - PT/OT consult.   - TCU vs home with 24h assist. SW aware. Continue to follow recs.    # Diarrhea  -Check C.diff     FEN:   - MIVF with 1/2NS and 75mEq bicarb @ 125ml/hr per nephrology  - Follow lytes closely  - Regular diet as tolerated     # Mild-Moderate Malnutrition 2/2 to chronic disease/infections, multiple hospitalizations.  - Regular diet, encourage snacks/supplements.  - Juan Jose counts initiated (1/5).     PPX (DVT/GI): protonix daily, ambulate    LINES/DRAINS: Port.  CONSULTS: Nephrology, ENT, OT, PT, ID.   CODE: Full.  DISPO: Anticipate d/c within next few days pending need for placement to TCU, improvement in mental status, and SCr trend.    Patient and plan of care discussed with staff attending, Dr. Guillen.    Erna Almeida PA-C  Hematology/Oncology  [phone]    Interval History   David seen with wife at bedside. Appears agitated, picking at fingernails and somewhat tremulous. Intermittently somnolent, disoriented. When asked, says he is having pain in his legs and back. Refusing to eat per report.    Physical Exam   Temp: 96.8  F (36  C) Temp src: Oral BP: 138/77 Pulse: 76 Heart Rate: 92 Resp: 18 SpO2: 98 % O2 Device: None (Room air)    Vitals:    01/07/18 0831 01/08/18 0900 01/09/18 0750   Weight: 77.1 kg (170 lb) 78 kg (172 lb) 75.7 kg (166 lb 14.4 oz)     Vital Signs with Ranges  Temp:  [95.3  F (35.2  C)-99.1  F (37.3  C)] 96.8  F (36  C)  Pulse:  [76-99] 76  Heart Rate:  [73-92] 92  Resp:  [16-18] 18  BP: (115-138)/(69-85) 138/77  SpO2:  [94 %-99 %] 98 %  I/O last 3 completed shifts:  In: 2250.5 [P.O.:480; I.V.:1770.5]  Out: 2000 [Urine:2000]    Constitutional: Awake, intermittently somnolent, seen lying in bed.  Eyes: Lids and lashes normal, pupils equal, round and reactive to light, extra ocular muscles intact, sclera clear, conjunctiva normal.  ENT: Normocephalic, without obvious abnormality, atraumatic.  Respiratory: No increased work of breathing, good air exchange, clear to auscultation bilaterally, no crackles or wheezing.  Cardiovascular: Regular rate and rhythm, normal S1 and S2, and no murmur noted.  GI: Normal bowel sounds, soft, non-distended, non-tender.  Musculoskeletal: No LE edema bilaterally.  Neurologic: CN II-XII intact as tested.  strength intact bilaterally. Has difficulty with following commands.  Neuropsychiatric: Calm, normal eye contact, alert, normal affect. Disoriented.    Medications     IV infusion builder WITH  additives 125 mL/hr at 01/09/18 0628     - MEDICATION INSTRUCTIONS -         acyclovir  400 mg Oral BID     pantoprazole  40 mg Oral QAM AC     diltiazem  60 mg Oral BID     sodium chloride (PF)  20 mL Intracatheter Q8H     heparin lock flush  5-10 mL Intracatheter Q24H     heparin  5 mL Intracatheter Q28 Days     citalopram (celeXA) tablet 40 mg  40 mg Oral Daily       Data   Results for orders placed or performed during the hospital encounter of 01/02/18 (from the past 24 hour(s))   CBC with platelets differential   Result Value Ref Range    WBC 2.3 (L) 4.0 - 11.0 10e9/L    RBC Count 2.77 (L) 4.4 - 5.9 10e12/L    Hemoglobin 8.2 (L) 13.3 - 17.7 g/dL    Hematocrit 25.7 (L) 40.0 - 53.0 %    MCV 93 78 - 100 fl    MCH 29.6 26.5 - 33.0 pg    MCHC 31.9 31.5 - 36.5 g/dL    RDW 17.1 (H) 10.0 - 15.0 %    Platelet Count 46 (LL) 150 - 450 10e9/L    Diff Method Manual Differential     % Neutrophils 86.1 %    % Lymphocytes 3.5 %    % Monocytes 7.8 %    % Eosinophils 0.9 %    % Basophils 0.0 %    % Myelocytes 1.7 %    Nucleated RBCs 1 (H) 0 /100    Absolute Neutrophil 2.0 1.6 - 8.3 10e9/L    Absolute Lymphocytes 0.1 (L) 0.8 - 5.3 10e9/L    Absolute Monocytes 0.2 0.0 - 1.3 10e9/L    Absolute Eosinophils 0.0 0.0 - 0.7 10e9/L    Absolute Basophils 0.0 0.0 - 0.2 10e9/L    Absolute Myelocytes 0.0 0 10e9/L    Absolute Nucleated RBC 0.0     Anisocytosis Slight     Platelet Estimate Confirming automated cell count    Basic metabolic panel   Result Value Ref Range    Sodium 134 133 - 144 mmol/L    Potassium 3.9 3.4 - 5.3 mmol/L    Chloride 98 94 - 109 mmol/L    Carbon Dioxide 26 20 - 32 mmol/L    Anion Gap 11 3 - 14 mmol/L    Glucose 66 (L) 70 - 99 mg/dL    Urea Nitrogen 34 (H) 7 - 30 mg/dL    Creatinine 3.66 (H) 0.66 - 1.25 mg/dL    GFR Estimate 18 (L) >60 mL/min/1.7m2    GFR Estimate If Black 21 (L) >60 mL/min/1.7m2    Calcium 10.1 8.5 - 10.1 mg/dL   Uric acid   Result Value Ref Range    Uric Acid 6.5 3.5 - 7.2 mg/dL   Magnesium    Result Value Ref Range    Magnesium 2.2 1.6 - 2.3 mg/dL   Phosphorus   Result Value Ref Range    Phosphorus 4.9 (H) 2.5 - 4.5 mg/dL   Calcium ionized   Result Value Ref Range    Calcium Ionized 5.0 4.4 - 5.2 mg/dL   Hepatic panel   Result Value Ref Range    Bilirubin Direct <0.1 0.0 - 0.2 mg/dL    Bilirubin Total 0.4 0.2 - 1.3 mg/dL    Albumin 2.0 (L) 3.4 - 5.0 g/dL    Protein Total 11.2 (H) 6.8 - 8.8 g/dL    Alkaline Phosphatase 122 40 - 150 U/L    ALT 26 0 - 70 U/L    AST 40 0 - 45 U/L   Glucose by meter   Result Value Ref Range    Glucose 80 70 - 99 mg/dL   EKG 12-lead, complete   Result Value Ref Range    Interpretation ECG Click View Image link to view waveform and result    Clostridium difficile toxin B PCR   Result Value Ref Range    Specimen Description Feces     C Diff Toxin B PCR Negative NEG^Negative     I have seen, interviewed, and examined the patient independently.  I have reviewed the vital signs and labs.  This note reflects my assessment and plan.    Continues to be afebrile, Cr continues to improve. iCalcium in good range. Yet, patient is confused, agitated and paranoid today. It is worse today than it has been. Unclear why. This is unexpected in the setting of improving overall clinical status. Unable to obtain MRI due to defibrillator. Also, not preferable given ARF. Will obtain neuro consult for assistance with work up.    Received Betty+Dex, IVIG    Flaca Guillen MD/PhD

## 2018-01-09 NOTE — PLAN OF CARE
Problem: Patient Care Overview  Goal: Plan of Care/Patient Progress Review  Discharge Planner PT   Patient plan for discharge: Pt would like to go home, Spouse preferring TCU for short stay for pt to get stronger  Current status: Pt amb 300 ft x2 with FWW and CGA, demonstrating slow gait and shuffling steps. Pt required sitting rest break after 300 ft d/t fatigue and BLE weakness. Pt transferred sit<>stand from lower bench surface and required modA d/t weakness. Pt required River to bring BLEs back into bed.  Barriers to return to prior living situation: weakness, poor activity tolerance, requires assist for all mobility  Recommendations for discharge: TCU  Rationale for recommendations:  To improve pts safety and IND with all functional mobility in order to DC home safely.       Entered by: Esthela Ross 01/09/2018 4:22 PM

## 2018-01-09 NOTE — MR AVS SNAPSHOT
After Visit Summary   1/9/2018    Jeffrey Real    MRN: [id number]           Patient Information     Date Of Birth          1965        Visit Information        Provider Department      1/9/2018 4:00 PM [facility] EEG TECH 4 [facility] EEG        Today's Diagnoses     Altered mental status    -  1       Follow-ups after your visit        Your next 10 appointments already scheduled     Jan 11, 2018  7:00 AM CST   [facility] Lab Draw with  [facility] DRAW   [facility] ([facility])    [address] [place] [address]  [address]  [facility] [id number] [phone]            Jan 11, 2018  7:30 AM CST   (Arrive by 7:15 AM)   Return Visit with HERMILO Burnham   [facility] ([facility])    [address] [place] [address]  [address]  [facility] [id number] [phone]            Jan 11, 2018  8:30 AM CST   Infusion 360 with [facility] ONCOLOGY INFUSION, UC 17 ATC   [facility] ([facility])    [address] [place] [address]  [address] [place] [id number] [phone]              Who to contact     Please call your clinic at [phone] to:    Ask questions about your health    Make or cancel appointments    Discuss your medicines    Learn about your test results    Speak to your doctor   If you have compliments or concerns about an experience at your clinic, or if you wish to file a complaint, please contact [facility] Patient Relations at [phone] or email us at [email or web address]         Additional Information About Your Visit        MyChart Information     Nandi [employer] gives you secure access to your electronic health record. If you see a primary care provider, you can also send messages to your care team and make appointments. If you have questions, please call your primary care clinic.  If you do not have a primary care provider, please call [phone] and  they will assist you.      Mc4 is an electronic gateway that provides easy, online access to your medical records. With Mc4, you can request a clinic appointment, read your test results, renew a prescription or communicate with your care team.     To access your existing account, please contact your [facility] or call [phone] for assistance.        Care EveryWhere ID     This is your Care EveryWhere ID. This could be used by other organizations to access your [facility] medical records  [id number]         Blood Pressure from Last 3 Encounters:   01/10/18 138/84   11/27/17 120/79   12/03/15 118/80    Weight from Last 3 Encounters:   01/09/18 75.7 kg (166 lb 14.4 oz)   11/27/17 83.8 kg (184 lb 11.2 oz)   12/03/15 97.3 kg (214 lb 8.1 oz)              Today, you had the following     No orders found for display         Today's Medication Changes      Notice     This visit is during an admission. Changes to the med list made in this visit will be reflected in the After Visit Summary of the admission.             Primary Care Provider Office Phone # Fax #    Hipolito DO Ayo [phone]       [address] [address]        Equal Access to Services     NALINI MCKEON : Hadii matthew santiagoo Socordeliaali, waaxda luqadaha, qaybta kaalmada adeegyada, adiel wynne. So [facility] [phone].    ATENCIÓN: Si habla español, tiene a guardado disposición servicios gratuitos de asistencia lingüística. Llame al [phone].    We comply with applicable federal civil rights laws and [place] laws. We do not discriminate on the basis of race, color, national origin, age, disability, sex, sexual orientation, or gender identity.            Thank you!     Thank you for choosing [facility]  for your care. Our goal is always to provide you with excellent care. Hearing back from our patients is one way we can continue to improve our services.  Please take a few minutes to complete the written survey that you may receive in the mail after your visit with us. Thank you!             Your Updated Medication List - Protect others around you: Learn how to safely use, store and throw away your medicines at www.disposemymeds.org.      Notice     This visit is during an admission. Changes to the med list made in this visit will be reflected in the After Visit Summary of the admission.

## 2018-01-09 NOTE — PROGRESS NOTES
[facility]: [place]  Neurology Consultation    Patient Name:  Jeffrey Real  MRN:  [id number]    :  1965  Date of Admission:  2018  Date of Service:  2018  Primary care provider:  Hipolito Rollins      We were asked to see the patient by Hematology/Oncology to evaluate:  Altered Mental Status    History of Present Illness:   Jeffrey Real is a 52 year old male with PMH of refractory IgA multiple myeloma admitted on 18 for CHRISTIANE with concern for sepsis now with altered mental status. Pt is an unreliable historian. Wife at the bedside reports multiple episodes over the last year of confusion associated with acute illness, specifically his >10 day admission in 2017 to where he was delirious at times.  Other events have been occurring at home in the last 1-2 months, these episodes are normally very brief and generally revolve around saying an odd thing or seeing something that isn't there.  These events have increased in frequency and duration over the last month since the patient was admitted for pneumonia 17.  After discharge on 17, the patient was back to baseline for a brief time but the events continued to worsen and he was seen again until needing to be transferred from [facility] on 2018 for lethargy, CHRISTIANE, PNA, and CKD w/Altered mental status.  While in the hospital, his medications for pain were discontinued on  due to concern that they were leading to his suppressed mental state.  Nursing indicate that the patient didn't sleep most of the night from - and was becoming agitated w/out his pain management.  He required haldol for agitation control the AM of .  Neurology was consulted for worsening agitation.    The patient's wife doesn't report any times of LOC where the patient was shaking or became unconscious.  He has no history of seizure, head trauma, prolonged birth, or febrile seizures.  He remains neutropenic and  has been treated with neupogen & Abx for Neutropenic fevers recently.  ID consulted 1/4/2018 and weren't to concerned for central infection, but did recommend cryptococcal ag testing as well as continued empirical therapy w/Acyclovir for viral infection leading to non-neutropenic fever in the setting of sinusitis and possible PNA.    The patient has a history of Multiple Myeloma IgA kappa s/p Autologous peripheral blood stem cell transplant in 8/2014.  He has multiple rib lesions, and has has XRT to lesions in the spine (T7, T9, T11, T12).  He also has had pathological fractures of T2 spinous process and b/l T1 transverse process in 12/26/2017.  Myeloma management for his has required multiple therapies including (RVD, auto+mlphalan, Velcade, VDT-PACE followed by PCD) and he was most recently on pomailidomide/carfilzomib & dex20+ cycles (started 2/2017) with switch to daratumumab/pomailidomide & dex in 11/2017.      ROS: A 10-point ROS is negative except as noted above.      Past Medical/Surgical History:  CKD (baseline Cr 0.8)  Paroxysmal VT  Multiple Myeloma   Syncope  Peripheral neuropathy due to chemotherapy    Medications:    Current Facility-Administered Medications   Medication     oxyCODONE IR (ROXICODONE) tablet 5-10 mg     diphenhydrAMINE (BENADRYL) capsule 50 mg    Or     diphenhydrAMINE (BENADRYL) injection 50 mg     acetaminophen (TYLENOL) tablet 650 mg     EPINEPHrine (ADRENALIN) kit 0.3 mg     diphenhydrAMINE (BENADRYL) injection 50 mg     methylPREDNISolone sodium succinate (solu-MEDROL) injection 125 mg     ranitidine (ZANTAC) injection 50 mg     NaCl 0.45 % 1,000 mL with sodium bicarbonate 75 mEq/L infusion     acyclovir (ZOVIRAX) tablet 400 mg     pantoprazole (PROTONIX) EC tablet 40 mg     magnesium sulfate 2 g in NS intermittent infusion ([facility] or [facility])     magnesium sulfate 4 g in 100 mL sterile water (premade)     lidocaine (viscous) (XYLOCAINE) 2 % solution 5 mL     diltiazem  "(CARDIZEM) tablet 60 mg     LORazepam (ATIVAN) injection 0.5 mg     glucose 40 % gel 15-30 g    Or     dextrose 50 % injection 25-50 mL    Or     glucagon injection 1 mg     sodium chloride (PF) 0.9% PF flush 20 mL     sodium chloride (PF) 0.9% PF flush 10-20 mL     heparin lock flush 10 UNIT/ML injection 5-10 mL     heparin lock flush 10 UNIT/ML injection 5-10 mL     heparin 100 UNIT/ML injection 5 mL     sodium chloride (PF) 0.9% PF flush 10-20 mL     citalopram (celeXA) tablet 40 mg     Medication Instruction     prochlorperazine (COMPAZINE) tablet 5-10 mg    Or     prochlorperazine (COMPAZINE) injection 5 mg     acetaminophen (TYLENOL) tablet 650 mg     naloxone (NARCAN) injection 0.1-0.4 mg     Allergies:  Allergies   Allergen Reactions     Blood-Group Specific Substance      Other reaction(s): Other - Describe In Comment Field  Patient has a Nonspecific antibody. Blood Product orders may be delayed.  Draw one red top and two purple top tubes for ALL Type and Screen/ Type and Crossmatch orders.     Chlorhexidine      Other reaction(s): Irritation At Patch Site  Itching with Prevantic Swabs     Levofloxacin Rash     Stopped levaquin and rash resolved by 14     Social History:  He has two biological children. He is . He worked as a  with a [employer] company. He is a nonsmoker. He has a drink occasionally. His wife, Adali, also works in the same business.    Family History:    His father  of cancer of unknown primary. It started in his back and spread to his lung at 64. His mother  of complications of diabetes. He has one brother and one sister. His brother has some circulation issues and diabetes.    Neurologic Examination:    Vitals: /77  Pulse 76  Temp 96.8  F (36  C) (Oral)  Resp 18  Ht 1.778 m (5' 10\")  Wt 75.7 kg (166 lb 14.4 oz)  SpO2 98%  BMI 23.95 kg/m2  General: Cooperative, NAD  HEENT: Sclerae anicteric, MMM, neck supple   Neurologic:     Mental Status: Alert to " time, place, situation. Follows complex tasks and word association tasks.  No issues with naming common items, remembering 2 words after three minute delay, and remembering major events in history with clarity.  No major issues with executive dysfunction screen (luria testing w/out error), no frontal release signs.  Patient doesn't confabulate but does indicate seeing things that he believes to be present when they are not (festival outside, coyote in the window).      Cranial Nerves: Visual fields intact. PERRL. EOMI with saccadic intrusion, no diplopia or nystagmus. Facial sensation intact/symmetric. Facial movements symmetric. Hearing intact to conversation. Palate elevation symmetric, uvula midline. No dysarthria. Shoulder shrug strong bilaterally. Tongue protrusion midline.     Motor: Multiple twitch movements that weren't myoclonic in nature due to largeness of motor group. Asymmetric and spontaneous in nature w/no component of facial, neck or truncal involvement.  Asterixis present in minimal form with arms extended. Increased tone bilaterally in the upper and lower extremities altogether. No pronator drift. Strength 5/5 throughout upper and lower extremities.     Deep Tendon Reflexes: 2+/symmetric throughout. No clonus. Toes downgoing.     Sensory: Light touch, pinprick, vibratory and proprioceptive sensation intact/symmetric throughout upper extremity. Limited vibratory and proprioceptive findings in LE b/l, consistent with peripheral neuropathy previously reported.     Coordination: FNF without dysmetria or ataxia. Celeste within normal limits.     Station/Gait: Stands with assistance from a deep seated position. Widened stance.    Pertinent Investigations:   Na 134, K 3.9, BUN 34, Cr 34, GFR 18  Ca 21, Mg 2.2, Phos 4.9  ALT 26, AST 40, Alk Phos 122  Glucose 66  WBC 2.3, Plt 46    C.diff negative PCR    Chest CT w/out contrast 1/4/2018:  1. Diffuse mosaic attenuation suggestive of small vessel or small airway  disease.   2. Basilar predominant atelectasis without definite consolidation.  Please note however that this study is suboptimal due to substantial respiratory motion.  3. Central bronchial wall thickening consistent with infectious or inflammatory bronchitis.  4. Numerous subacute to chronic pathologic rib fractures and compression deformities of the thoracic spine associated lytic lesions consistent with known multiple myeloma    HCT w/out contrast: 1/2/2018  1. No acute intracranial pathology.  2. Mild chronic small vessel ischemic disease.  3. Layering paranasal sinus fluid and mucosal thickening, which can be seen in the setting of acute sinusitis.  4. Innumerable lytic lesions consistent with history of multiple myeloma.    Impression:  51 y/o M with complex PMH including refractory IgA Multiple Myeloma s/p autologous BMT 08/2014, most recently on Daralex/Pom/Decadron admitted from OSH r/t CHRISTIANE on CKD with concern for PNA/sepsis now with encephalopathy vs. dileptic spells.  Symptoms of encephalopathy (disorientation, confusion, agitation) generally occurring with acute illness.  No history concerning for repetitive seizure like events, but considering his ongoing confusion that seems to occur relatively rapidly, seizure is not entirely ruled out as an etiology for his delirium.  Upon mediation review, it is unlikely that any of his MM medications are causing a lowered seizure threshold.  Mostl likely, his ongoing illness with CHRISTIANE is leading to his encephalopathic findings consistent with delirium and we are in agreement with minimizing his previous pain management while his creatinine trends back to baseline.  He should have basic delirium precaution, and an EEG for 24 hours during episodes of delirium.  Hopefully his symptoms will resolve slowly (week) with improvement of renal function.    Recommendations:   -vEEG   -consider MRI if symptoms continue or worsen  - Neurology will continue to follow and comment  on testing with further recs to follow    Thank you for involving neurology in the care of Jeffrey Real.  Please do not hesitate to call with questions/concerns.      Patient was seen and discussed with attending neurologist, Dr. Ayaz MD and he is in agreement with the impression and recommendations.    SHAYY Bojorquez.  PGY4 Neurology    Patient's note, history, and part of physical examination assisted by  Russel Arce MS3

## 2018-01-09 NOTE — PLAN OF CARE
Pt continues to refuse PO intake to increase blood sugar. AM given with apple juice. Breakfast ordered, despite pt refusing food. Will encourage pt to eat when food arrives. Pt currently asymptomatic.

## 2018-01-09 NOTE — PLAN OF CARE
"Problem: Patient Care Overview  Goal: Plan of Care/Patient Progress Review  /82 (BP Location: Left arm)  Pulse 76  Temp 95.3  F (35.2  C) (Axillary)  Resp 18  Ht 1.778 m (5' 10\")  Wt 78 kg (172 lb)  SpO2 95%  BMI 24.68 kg/m2     VSS on RA. C/o lower back/butt pain/discomfort, PRN tylenol given x1 with little relief, pt states the pain keep him up through the night, aqua K pad ordered for pt to place on back. A&Ox4, continues to have hallucinations of a white truck outside his window per report however pt did not have any overnight. Tolerating a regular diet. Wife at bedside, bed alarm off while she is here. Gave 0.5 mg IV ativan x1 per pt request to help pt try and get some rest. Up with SBA + walker. Voiding adequately. BM x1 overnight. Port infusing IVMF of NaCL 0.45% + sodium bicarb @ 125mL/hr. Will cont to monitor and cont with POC, updating team with any concerns.       "

## 2018-01-09 NOTE — PLAN OF CARE
Afebrile, vss. BG 66 this AM, went up to 80 after apple juice then pt refused further intervention. Pt restless most of the day. Haldol given x1, helpful as he was less agitated. 5mg Oxycodone given x1 for low back and rt leg pain. Video EEG to start this michelle. Stool specimen sent, awaiting results. Barrier cream applied to backside for irritation from loose stools. Continue POC.

## 2018-01-10 ENCOUNTER — ALLIED HEALTH/NURSE VISIT (OUTPATIENT)
Dept: NEUROLOGY | Facility: CLINIC | Age: 53
DRG: 871 | End: 2018-01-10
Attending: PSYCHIATRY & NEUROLOGY
Payer: COMMERCIAL

## 2018-01-10 ENCOUNTER — APPOINTMENT (OUTPATIENT)
Dept: PHYSICAL THERAPY | Facility: CLINIC | Age: 53
DRG: 871 | End: 2018-01-10
Attending: INTERNAL MEDICINE
Payer: COMMERCIAL

## 2018-01-10 DIAGNOSIS — C90.02 MULTIPLE MYELOMA IN RELAPSE (H): Primary | ICD-10-CM

## 2018-01-10 LAB
ABO + RH BLD: ABNORMAL
ABO + RH BLD: ABNORMAL
AMMONIA PLAS-SCNC: 65 UMOL/L (ref 10–50)
ANION GAP SERPL CALCULATED.3IONS-SCNC: 11 MMOL/L (ref 3–14)
ANISOCYTOSIS BLD QL SMEAR: SLIGHT
BACTERIA SPEC CULT: NO GROWTH
BACTERIA SPEC CULT: NO GROWTH
BASOPHILS # BLD AUTO: 0 10E9/L (ref 0–0.2)
BASOPHILS NFR BLD AUTO: 0.9 %
BLD GP AB SCN SERPL QL: ABNORMAL
BLD PROD TYP BPU: ABNORMAL
BLOOD BANK CMNT PATIENT-IMP: ABNORMAL
BLOOD BANK CMNT PATIENT-IMP: ABNORMAL
BUN SERPL-MCNC: 30 MG/DL (ref 7–30)
CALCIUM SERPL-MCNC: 10 MG/DL (ref 8.5–10.1)
CHLORIDE SERPL-SCNC: 97 MMOL/L (ref 94–109)
CO2 SERPL-SCNC: 29 MMOL/L (ref 20–32)
CORTIS SERPL-MCNC: 11.1 UG/DL (ref 4–22)
CREAT SERPL-MCNC: 3.49 MG/DL (ref 0.66–1.25)
DIFFERENTIAL METHOD BLD: ABNORMAL
EOSINOPHIL # BLD AUTO: 0 10E9/L (ref 0–0.7)
EOSINOPHIL NFR BLD AUTO: 2.6 %
ERYTHROCYTE [DISTWIDTH] IN BLOOD BY AUTOMATED COUNT: 17.2 % (ref 10–15)
GFR SERPL CREATININE-BSD FRML MDRD: 19 ML/MIN/1.7M2
GLUCOSE BLDC GLUCOMTR-MCNC: 53 MG/DL (ref 70–99)
GLUCOSE BLDC GLUCOMTR-MCNC: 78 MG/DL (ref 70–99)
GLUCOSE BLDC GLUCOMTR-MCNC: 86 MG/DL (ref 70–99)
GLUCOSE BLDC GLUCOMTR-MCNC: 87 MG/DL (ref 70–99)
GLUCOSE BLDC GLUCOMTR-MCNC: 88 MG/DL (ref 70–99)
GLUCOSE BLDC GLUCOMTR-MCNC: 88 MG/DL (ref 70–99)
GLUCOSE SERPL-MCNC: 65 MG/DL (ref 70–99)
HBA1C MFR BLD: 5.8 % (ref 4.3–6)
HCT VFR BLD AUTO: 26.5 % (ref 40–53)
HGB BLD-MCNC: 8.2 G/DL (ref 13.3–17.7)
IGA SERPL-MCNC: 6730 MG/DL (ref 70–380)
IGG SERPL-MCNC: 315 MG/DL (ref 695–1620)
IGM SERPL-MCNC: <4 MG/DL (ref 60–265)
INTERPRETATION ECG - MUSE: NORMAL
INTERPRETATION ECG - MUSE: NORMAL
LYMPHOCYTES # BLD AUTO: 0.1 10E9/L (ref 0.8–5.3)
LYMPHOCYTES NFR BLD AUTO: 4.4 %
MAGNESIUM SERPL-MCNC: 2 MG/DL (ref 1.6–2.3)
MCH RBC QN AUTO: 29.6 PG (ref 26.5–33)
MCHC RBC AUTO-ENTMCNC: 30.9 G/DL (ref 31.5–36.5)
MCV RBC AUTO: 96 FL (ref 78–100)
METAMYELOCYTES # BLD: 0 10E9/L
METAMYELOCYTES NFR BLD MANUAL: 1.8 %
MONOCYTES # BLD AUTO: 0.1 10E9/L (ref 0–1.3)
MONOCYTES NFR BLD AUTO: 7.9 %
MYELOCYTES # BLD: 0.1 10E9/L
MYELOCYTES NFR BLD MANUAL: 4.4 %
NEUTROPHILS # BLD AUTO: 1.2 10E9/L (ref 1.6–8.3)
NEUTROPHILS NFR BLD AUTO: 78 %
NUM BPU REQUESTED: 2
PHOSPHATE SERPL-MCNC: 5.5 MG/DL (ref 2.5–4.5)
PLATELET # BLD AUTO: 50 10E9/L (ref 150–450)
PLATELET # BLD EST: ABNORMAL 10*3/UL
POTASSIUM SERPL-SCNC: 3.3 MMOL/L (ref 3.4–5.3)
PROT PATTERN SERPL IFE-IMP: ABNORMAL
RBC # BLD AUTO: 2.77 10E12/L (ref 4.4–5.9)
ROULEAUX BLD QL SMEAR: ABNORMAL
SODIUM SERPL-SCNC: 137 MMOL/L (ref 133–144)
SPECIMEN EXP DATE BLD: ABNORMAL
SPECIMEN SOURCE: NORMAL
SPECIMEN SOURCE: NORMAL
URATE SERPL-MCNC: 7.4 MG/DL (ref 3.5–7.2)
WBC # BLD AUTO: 1.5 10E9/L (ref 4–11)

## 2018-01-10 PROCEDURE — 27210995 ZZH RX 272: Performed by: PHYSICIAN ASSISTANT

## 2018-01-10 PROCEDURE — 00000146 ZZHCL STATISTIC GLUCOSE BY METER IP

## 2018-01-10 PROCEDURE — 25000132 ZZH RX MED GY IP 250 OP 250 PS 637: Performed by: NURSE PRACTITIONER

## 2018-01-10 PROCEDURE — 25000132 ZZH RX MED GY IP 250 OP 250 PS 637

## 2018-01-10 PROCEDURE — 12000008 ZZH R&B INTERMEDIATE UMMC

## 2018-01-10 PROCEDURE — 82140 ASSAY OF AMMONIA: CPT | Performed by: PHYSICIAN ASSISTANT

## 2018-01-10 PROCEDURE — 87040 BLOOD CULTURE FOR BACTERIA: CPT | Performed by: PHYSICIAN ASSISTANT

## 2018-01-10 PROCEDURE — 95951 ZZHC EEG VIDEO < 12 HR: CPT | Mod: 52,ZF

## 2018-01-10 PROCEDURE — 36592 COLLECT BLOOD FROM PICC: CPT | Performed by: NURSE PRACTITIONER

## 2018-01-10 PROCEDURE — 25000128 H RX IP 250 OP 636: Performed by: PHYSICIAN ASSISTANT

## 2018-01-10 PROCEDURE — 93005 ELECTROCARDIOGRAM TRACING: CPT

## 2018-01-10 PROCEDURE — 83036 HEMOGLOBIN GLYCOSYLATED A1C: CPT | Performed by: NURSE PRACTITIONER

## 2018-01-10 PROCEDURE — 97110 THERAPEUTIC EXERCISES: CPT | Mod: GP | Performed by: PHYSICAL THERAPIST

## 2018-01-10 PROCEDURE — 40000193 ZZH STATISTIC PT WARD VISIT: Performed by: PHYSICAL THERAPIST

## 2018-01-10 PROCEDURE — 93010 ELECTROCARDIOGRAM REPORT: CPT | Performed by: INTERNAL MEDICINE

## 2018-01-10 PROCEDURE — 80048 BASIC METABOLIC PNL TOTAL CA: CPT | Performed by: NURSE PRACTITIONER

## 2018-01-10 PROCEDURE — 85025 COMPLETE CBC W/AUTO DIFF WBC: CPT | Performed by: NURSE PRACTITIONER

## 2018-01-10 PROCEDURE — 25800025 ZZH RX 258: Performed by: PHYSICIAN ASSISTANT

## 2018-01-10 PROCEDURE — 36415 COLL VENOUS BLD VENIPUNCTURE: CPT | Performed by: PHYSICIAN ASSISTANT

## 2018-01-10 PROCEDURE — 84550 ASSAY OF BLOOD/URIC ACID: CPT | Performed by: NURSE PRACTITIONER

## 2018-01-10 PROCEDURE — 83735 ASSAY OF MAGNESIUM: CPT | Performed by: NURSE PRACTITIONER

## 2018-01-10 PROCEDURE — 82533 TOTAL CORTISOL: CPT | Performed by: PHYSICIAN ASSISTANT

## 2018-01-10 PROCEDURE — 87086 URINE CULTURE/COLONY COUNT: CPT | Performed by: PHYSICIAN ASSISTANT

## 2018-01-10 PROCEDURE — 84100 ASSAY OF PHOSPHORUS: CPT | Performed by: NURSE PRACTITIONER

## 2018-01-10 PROCEDURE — 25000132 ZZH RX MED GY IP 250 OP 250 PS 637: Performed by: PHYSICIAN ASSISTANT

## 2018-01-10 RX ORDER — POTASSIUM CHLORIDE 750 MG/1
20 TABLET, EXTENDED RELEASE ORAL ONCE
Status: COMPLETED | OUTPATIENT
Start: 2018-01-10 | End: 2018-01-10

## 2018-01-10 RX ORDER — HALOPERIDOL 5 MG/ML
.5-1 INJECTION INTRAMUSCULAR EVERY 6 HOURS PRN
Status: DISCONTINUED | OUTPATIENT
Start: 2018-01-10 | End: 2018-01-15

## 2018-01-10 RX ADMIN — DEXTROSE AND SODIUM CHLORIDE: 5; 450 INJECTION, SOLUTION INTRAVENOUS at 12:14

## 2018-01-10 RX ADMIN — POTASSIUM CHLORIDE 20 MEQ: 750 TABLET, EXTENDED RELEASE ORAL at 13:21

## 2018-01-10 RX ADMIN — DILTIAZEM HYDROCHLORIDE 60 MG: 60 TABLET, FILM COATED ORAL at 09:08

## 2018-01-10 RX ADMIN — OXYCODONE HYDROCHLORIDE 10 MG: 5 TABLET ORAL at 13:13

## 2018-01-10 RX ADMIN — ACYCLOVIR 400 MG: 400 TABLET ORAL at 09:08

## 2018-01-10 RX ADMIN — DILTIAZEM HYDROCHLORIDE 60 MG: 60 TABLET, FILM COATED ORAL at 19:59

## 2018-01-10 RX ADMIN — CITALOPRAM HYDROBROMIDE 40 MG: 20 TABLET ORAL at 09:08

## 2018-01-10 RX ADMIN — DEXTROSE AND SODIUM CHLORIDE: 5; 450 INJECTION, SOLUTION INTRAVENOUS at 21:01

## 2018-01-10 RX ADMIN — PANTOPRAZOLE SODIUM 40 MG: 40 TABLET, DELAYED RELEASE ORAL at 09:07

## 2018-01-10 RX ADMIN — OXYCODONE HYDROCHLORIDE 10 MG: 5 TABLET ORAL at 21:01

## 2018-01-10 RX ADMIN — SODIUM BICARBONATE: 84 INJECTION, SOLUTION INTRAVENOUS at 00:39

## 2018-01-10 ASSESSMENT — PAIN DESCRIPTION - DESCRIPTORS
DESCRIPTORS: DISCOMFORT
DESCRIPTORS: DISCOMFORT

## 2018-01-10 NOTE — PROGRESS NOTES
[facility] [place]  Hematology / Oncology Progress Note     Assessment & Plan   Jeffrey Real is a 53 y/o M with complex PMH including refractory IgA Multiple Myeloma s/p autologous transplant 08/2014, most recently on Daralex/Pom/Decadron (started 11/17), transferred from OSH d/t CHRISTIANE on CKD, persistent sinusitis, HCAP, concern for sepsis, & altered mental status.      # Delirium vs encephalopathy. On admit very lethargic/sleepy on exam. Hyperreflexic. Clonus. Likely multifactorial (kidney injury, long acting pain medications, hypercalcemia, infectious-sinusitis/PNA). Mental status waxing/waning over past few days.  - Head CT w/o contrast shows no evidence of acute intracranial pathology but + sinusitis & mild chronic small vessel ischemic disease.  - Held PTA oxycodone, dilaudid, ativan & gabapentin d/t potential sedating effects/CHRISTIANE, no withdrawal symptoms. Given reports of pain, added back oxycodone 5-10mg every 4h as needed.  - Waxing and waning, but progressively more agitated & confused in past 2 days. D/c'd PRN Ativan. QTc 456 this AM, using Haldol cautiously (0.5-1mg every 6 hours PRN).   - SLUMs score 20/30 per OT (abnormal)  - Neurology consulted, appreciate their input and recommendations. Video EEG obtained to r/o subclinical seizures. Difficult study given agitation, but indicates mild-to-moderate encephalopathy with no evidence of seizures seen.  - Hyperammonemic on admission, with normal liver function, so not routinely trended thereafter (70-->92-->95). Level re-checked today, slightly elevated at 65.  - Surveillance blood cultures drawn, urine culture recollected (negative when done on 1/2).   - May need LP to r/o meningeal involvement of myeloma. Unable to obtain brain MRI secondary to CHRISTIANE.  - Will hold acyclovir in the event it is exacerbating/contributing to AMS given impaired renal function.      # High Risk/Resistant Multiple Myeloma IgA kappa. S/P autologous  peripheral blood stem cell transplant in 08/2014.   # Multiple lytic lesions 2/2 MM (ribs, T7, T9, T11, T12) s/p XRT to thoracolumbar spine.  # Fracture L ischium.  # Hypogammaglobulinemia s/p IVIG replacement.  S/P multiple lines of therapy including auto tx (RVD, auto+melphalan, Velcade Maintenance, VDT-PACE, followed by PCD) complex cytogenetics (17 p deletion, p53 deletion) most recently on pomalidomide/carfilzomib & dex 20+ cycles (started 02/2017) with progression in November 2017. Most recently switched to daratumumab/pomalidomide/dex.  - Plan was to try to get M-spike < 1 gram% then pursue Allogeneic transplant (saw Dr. Torre in clinic 11/27/17). Has brother as haplo-identical donor.  - 12/26/17 found to have pathologic fractures of T2 spinous process & bilateral T1 transverse process. Destructive lytic lesion involving left scapula (partially imaged).   - S/p dex and daratumumab on 1/6/18, holding Pomalyst. Per wife, this was his 3rd dose of daratumumab since initiating in November 2017. Myeloma labs from this admission compared to prior labs from Nov 2017 at OSH, ? Slightly worse.      # Pancytopenia 2/2 to MM & related therapy. Recommended dose decrease with subsequent cycles.  - Keep HgB > 8, PLT > 10.       # Acute Kidney Injury/Failure 2/2 to ATN. Cr 4.5 at OSH (up from baseline 0.8-1.1). Likely r/t contrast (got facial bone contrast CT on 12/27 at OSH, + underlying MM) vs. worsening MM vs. Sepsis/infection. No evidence of hypotension in OSH records. Unable to see if he was anuric at OSH.  # Hyperkalemia --> RESOLVED  # Hyperphosphatemia --> improving  # Hyperuricemia --> improving  # Chronic kidney disease 2/2 to MM.  # Hx. CHRISTIANE requiring HD from sepsis.  - Avoid nephrotoxins, contrast, renally dose medications.  - Nephrology consulted, appreciate their input and assistance. Continue hydration with D5+ 1/2NS @ 125ml/hr. No Lasix unless signs/symptoms of fluid overload.  - BMP daily. Cr improved  to 3.49 today.      # Hypercalcemia. Last dose of Zometa was 12/7/17, on monthly schedule.  - Due for Zometa but holding given CHRISTIANE. Ionized calcium normalized, 5.0 on 1/9. Continue to follow. No plans to give Zometa or pamidronate currently (may consider giving pamidronate prior to discharge).    #Hypoglycemia.  -Noted intermittently. Reviewed records from OSH through [facility], appears to have occurred PTA as well. ? Due to CHRISTIANE. Continue to monitor, hypoglycemia protocol in place. Added D5 back to IV fluids. Consider endocrinology consult.    #Prolonged QTc  -Level on admission at 471. Repeat EKG 1/9 is 455. D/c'd Zofran, using Haldol with caution as above. Past EKGs show interval normal 8/2017, first prolonged on 12/25.      # HCAP & Sinusitis.   # Fever without neutropenia.  # RSV  # Hypogammaglobulinemia.  Streaky R lung base opacity. S/P multiple hospitalizations. Most recently 12/11-12/21, 12/25-12/30, & now 1/1- current (transfer from OSH). CT sinus + for sinusitis. Head CT 1/2/18 showing persistent sinusitis.  - Urine & blood CX NGTD from OSH. Unable to collect sputum culture.   - On Zosyn 1/2-1/4. Abx stopped given positive RSV, and negative cultures.  - Repeat blood culture & UA/UC ordered (NGTD, continue to monitor).  - ID consulted, appreciate their input. No ribavirin given for RSV d/t CHRISTIANE. Infectious workup otherwise negative.  - Given sucrose-free IVIG on 1/8.     # ID PPX. S/p Pentamidine 1/5/17. Acyclovir on hold per above (per guidelines, dose should be reduced from 400mg BID to 200mg BID if CrCl <10, but holding at this time to see if delirium improves).      # GERD, hx. Candida esophagitis.  - Protonix 40mg daily.      # Hx. Vtach s/p ICD placement.  # Hx. HTN.  - Diltiazem 60 mg BID initially held due to need for close monitoring of BPs for possible sepsis. Restarted 1/5/17, BPs stable.   - Remote history of Metoprolol use but not seen in last 2 hospitalization/clinic notes from  "December.    # Chronic pain 2/2 to MM. Multiple fractures/lytic lesions.  Held PTA meds d/t encephalopathy (OxyContin, Dilaudid).   - Concern for pain to legs/back as above. Started PRN oxycodone per above.      # Peripheral neuropathy 2/2 to MM therapy. Holding PTA gabapentin.  - Continue to hold at this time.    # Major depression. Continue PTA Celexa.    # Weakness/deconditioning 2/2 to multiple hospitalizations, MM/therapy, & chronic disease.  # Chronic fatigue.  - PT/OT consult.   - TCU vs home with 24h assist. SW aware. Continue to follow recs.    # Diarrhea  -C.diff negative.    # Mild-Moderate Malnutrition 2/2 to chronic disease/infections, multiple hospitalizations.  - Regular diet, encourage snacks/supplements.  - Juan Jose counts initiated (1/5).     FEN:   - MIVF with D5 + 1/2NS at 125ml/hr per nephrology team.  - Follow lytes closely, replace PRN  - Regular diet as tolerated     PPX (DVT/GI): protonix daily, ambulate   LINES/DRAINS: Port.  CONSULTS: Nephrology, ENT, OT, PT, ID.   CODE: Full.  DISPO: Discharge pending need for placement to TCU, improvement in mental status, and SCr trend.    Patient and plan of care discussed with staff attending, Dr. Guevara.    IVETTE YousifC  Hematology/Oncology  [phone]    Interval History   David seen with wife at bedside. Sleeping, did not sleep at all last night per reports, so history obtained from wife. She has noticed decline in mental status over past few days. He called her at 4AM very agitated. She reports he has been \"mean\" intermittently which does not fit with his usual personality traits. Not wanting to eat or drink.     Physical Exam   Temp: 95.5  F (35.3  C) Temp src: Axillary BP: 138/84 Pulse: 93 Heart Rate: 93 Resp: 18 SpO2: 96 % O2 Device: None (Room air) Oxygen Delivery: 1 LPM  Vitals:    01/07/18 0831 01/08/18 0900 01/09/18 0750   Weight: 77.1 kg (170 lb) 78 kg (172 lb) 75.7 kg (166 lb 14.4 oz)     Vital Signs with Ranges  Temp:  [95.5  F (35.3 "  C)-98.1  F (36.7  C)] 95.5  F (35.3  C)  Pulse:  [83-98] 93  Heart Rate:  [93-98] 93  Resp:  [16-20] 18  BP: (132-151)/(75-92) 138/84  SpO2:  [90 %-96 %] 96 %  I/O last 3 completed shifts:  In: 3787 [P.O.:830; I.V.:2957]  Out: 2100 [Urine:2100]    Constitutional: Seen lying in bed, somnolent.  Respiratory: No increased work of breathing, good air exchange.  Cardiovascular: Regular rate and rhythm.  Musculoskeletal: No LE edema bilaterally.  Neuropsychiatric: Somnolent, will open eyes and say hello, then goes back to sleep. No new focal neurologic signs.    Medications     dextrose 5% and 0.45% NaCl 125 mL/hr at 01/10/18 1214     - MEDICATION INSTRUCTIONS -         pantoprazole  40 mg Oral QAM AC     diltiazem  60 mg Oral BID     sodium chloride (PF)  20 mL Intracatheter Q8H     heparin lock flush  5-10 mL Intracatheter Q24H     heparin  5 mL Intracatheter Q28 Days     citalopram (celeXA) tablet 40 mg  40 mg Oral Daily       Data   Results for orders placed or performed during the hospital encounter of 01/02/18 (from the past 24 hour(s))   Glucose by meter   Result Value Ref Range    Glucose 53 (L) 70 - 99 mg/dL   Glucose by meter   Result Value Ref Range    Glucose 86 70 - 99 mg/dL   CBC with platelets differential   Result Value Ref Range    WBC 1.5 (L) 4.0 - 11.0 10e9/L    RBC Count 2.77 (L) 4.4 - 5.9 10e12/L    Hemoglobin 8.2 (L) 13.3 - 17.7 g/dL    Hematocrit 26.5 (L) 40.0 - 53.0 %    MCV 96 78 - 100 fl    MCH 29.6 26.5 - 33.0 pg    MCHC 30.9 (L) 31.5 - 36.5 g/dL    RDW 17.2 (H) 10.0 - 15.0 %    Platelet Count 50 (L) 150 - 450 10e9/L    Diff Method Manual Differential     % Neutrophils 78.0 %    % Lymphocytes 4.4 %    % Monocytes 7.9 %    % Eosinophils 2.6 %    % Basophils 0.9 %    % Metamyelocytes 1.8 %    % Myelocytes 4.4 %    Absolute Neutrophil 1.2 (L) 1.6 - 8.3 10e9/L    Absolute Lymphocytes 0.1 (L) 0.8 - 5.3 10e9/L    Absolute Monocytes 0.1 0.0 - 1.3 10e9/L    Absolute Eosinophils 0.0 0.0 - 0.7 10e9/L     Absolute Basophils 0.0 0.0 - 0.2 10e9/L    Absolute Metamyelocytes 0.0 0 10e9/L    Absolute Myelocytes 0.1 (H) 0 10e9/L    Anisocytosis Slight     Rouleaux Increased     Platelet Estimate Confirming automated cell count    Basic metabolic panel   Result Value Ref Range    Sodium 137 133 - 144 mmol/L    Potassium 3.3 (L) 3.4 - 5.3 mmol/L    Chloride 97 94 - 109 mmol/L    Carbon Dioxide 29 20 - 32 mmol/L    Anion Gap 11 3 - 14 mmol/L    Glucose 65 (L) 70 - 99 mg/dL    Urea Nitrogen 30 7 - 30 mg/dL    Creatinine 3.49 (H) 0.66 - 1.25 mg/dL    GFR Estimate 19 (L) >60 mL/min/1.7m2    GFR Estimate If Black 22 (L) >60 mL/min/1.7m2    Calcium 10.0 8.5 - 10.1 mg/dL   Uric acid   Result Value Ref Range    Uric Acid 7.4 (H) 3.5 - 7.2 mg/dL   Magnesium   Result Value Ref Range    Magnesium 2.0 1.6 - 2.3 mg/dL   Phosphorus   Result Value Ref Range    Phosphorus 5.5 (H) 2.5 - 4.5 mg/dL   Cortisol   Result Value Ref Range    Cortisol Serum 11.1 4 - 22 ug/dL   Hemoglobin A1c   Result Value Ref Range    Hemoglobin A1C 5.8 4.3 - 6.0 %   EKG 12-lead, complete   Result Value Ref Range    Interpretation ECG Click View Image link to view waveform and result    Ammonia   Result Value Ref Range    Ammonia 65 (H) 10 - 50 umol/L   Blood culture   Result Value Ref Range    Specimen Description Blood Portacath     Culture Micro PENDING    Blood culture   Result Value Ref Range    Specimen Description Blood Left Arm     Culture Micro PENDING        Staff Addendum: Patient was seen and examined by me. All labs, VS and pertinent images were reviewed with the team on rounds. Plans for care were mutually developed and outlined above with my direct input.    Interval Events/ROS: Remain without CNS improvement on my exam today. Delirious and not responding appropriately to really any questions or commands. Wife at bedside. NO obvious localizing s/s.  Mostly somnolent today.     Exam: NAD/doesn't answer questions at all for me, not cooperative with  exam. OP unable to be examined, lungs were clear, HRRR, Abd soft/NT/ND with slight guard one time, no HSM or masses, No LE edema, Nro grossly non-focal but this was a very limited exam, Skin exam was without rash.    A/P: 51yo M with IgA MM, relapsed after auto in 2014, here with new onset CHRISTIANE and change in mental status/delirium of uncertain etiology  - Will await EEG results today. Though rare, MM could go to brain so if negative EEG, may need to consider LP to r/o CNS spread somehow or infection though not high on my differential. Will also try holding ACV.   - Will continue to monitor renal function for now though if we really thought the MS changes were d/t his CHRISTIANE, I think some sort of dialysis would be indicated.  - Check Ammonia level; if higher would give lactulose.   - WIll check BCx to r/o occult blood borne illness.    Tony Guevara, DO

## 2018-01-10 NOTE — PROGRESS NOTES
Short Neurology Follow Up Note: 1/10/2018    S: patient with agitation overnight, day-night flipped.  BS low overnight (53). Ripped off all EEG leads after a few hours of recording.  No re-hooked up due to agitation.    O: Patient somnolent this AM. Answers questions appropriately but still having hallucinations.  Pupils equate and reactive, conjugate eyes movements with saccadic intrusions, no facial droop, no tongue deviations. Paratonia throughout (likely agitation). No clonus, no hyper-reflexia. Some action tremor that is high hertz, asymmetric and associated with tactile.      Data:   Na 137, K 3.3, Cr 3.49  Uric 7.4  Glucose 65  WBC 1.5, Plt 50    EE/10/2018  - Prelim read to follow with official read after.  Initial impression was for encephalopathy prior to EEG leads being removed.  Please await full read.    Impression and Recommendations:  52M with MM on Daralex and chronic steroids who had CHRISTIANE and encephalopathy upon admission.  Initial EEG was shortened due to agitation by patient and eventual ripping off of leads, but a few hours were recorded and initial impressions were of encephalopathy.  Full report to follow, but at this point in the patient's hospital course he is having acute agitation events w/continued confusion that worsen at night, no seizure like events or NCSE reported, ongoing CHRISTIANE, all of which support a diagnosis for delirium due to ongoing infection.  Central source of infection hasn't been completely ruled out, but we agree with ID that if there was a bacterial infection we would see a differing time course for symptoms.  He is already empirically covered for viral infection with acyclovir at this time.  Upon literature review, his monoclonal megan treatment doesn't have any associated reports of seizure and we wouldn't think this to be his etiology.    - Delirium likely due to CHRISTIANE and ongoing prolonged hospitalizations   - Neurology will follow peripherally with further recs to  follow should the patient worsen in mental status while improving in renal function    Patient seen and discussed with General neurology attending, MD Maryellen Gao D.O.  PGY4 Neurology

## 2018-01-10 NOTE — PROGRESS NOTES
CLINICAL NUTRITION SERVICES - REASSESSMENT NOTE     Nutrition Prescription    RECOMMENDATIONS FOR MDs/PROVIDERS TO ORDER:  - Recommend starting pt on an appetite stimulant to help prompt pt to eat.  - Would consider starting pt on phosphate binder to help with management of hyperphosphatemia.  - Recommend placing post-pyloric FT for initiation of TF in order to optimize pt's nutritional intakes to prevent further wt loss. Please order RD consult for TF Assess and Order in order to RD to manage pt's TF.    Malnutrition Status:    Severe malnutrition in the context of acute illness      Recommendations already ordered by Registered Dietitian (RD):  None    Future/Additional Recommendations:  If FT placed, recommend initiating TF with TwoCal HN @  10 ml/hr x 8 hrs. If tolerated, increase rate by 10 ml every 8 hrs to goal of 50 mL/hr (1200 mL/day) to provide 2400 kcals (32 kcal/kg/day), 101 g PRO (1.3 g/kg/day), 840 mL H2O, 263 g CHO and 6 g Fiber daily.       EVALUATION OF THE PROGRESS TOWARD GOALS   Diet: Regular with Ensure Plus supplement at 2 pm daily    Intake: Ave po intakes per Calorie Counts x 3 days (1/6-8) = 726 kcals and 29 g PRO which is meeting approximatley 40% of pt's estimated needs.  - Per RN report (pt sleeping and pt's wife didn't want to wake him), informed pt only taking bites of food yesterday and refusing po this morning, despite much encouragement and prompting from nursing. Lacks appetite and motivation to eat.    - Per pt's spouse, pt remains confused.     NEW FINDINGS   Weight: 75.7 kg (1/9) - lowest wt this admission, 80 kg (on 1/3); Wt loss of 4.3 kg (5% loss) x past week.     Labs:  BS 65 (low) today, possibly r/t poor po intakes   PO4 remains elevated, but trending downward  K+ 3.3. (today); low, question d/t increased losses secondary to receiving lasix on 1/7 in addition to poor po intakes over the past week.    ASSESSED NUTRITION NEEDS: (dosing wt changed to 76 kg - lowest wt this  admit)  Estimated Energy Needs: 6352-0739 kcals/day (25 - 30 kcals/kg)  Justification: Maintenance  Estimated Protein Needs:  grams protein/day (1.2- 1.5 grams of pro/kg)  Justification: Increased needs for repletion    MALNUTRITION  % Intake: </= 50% for >/= 5 days (severe)  % Weight Loss: > 5% in 1 month (severe) - Ongoing  Subcutaneous Fat Loss: Facial region: Mild  Muscle Loss: Facial & jaw region:, Upper arm (bicep, tricep): Mild and Upper leg (quadricep, hamstring): Mild/Moderate  Fluid Accumulation/Edema: None noted  Malnutrition Diagnosis: Severe malnutrition in the context of acute illness    Previous Goals   1. Wt not to decline < 80 kg    Evaluation: Not met    2. Patient to consume  >75% of nutritionally adequate meal trays TID, or the equivalent with supplements/snacks.    Evaluation: Not met    Previous Nutrition Diagnosis  Unintended weight loss related to suspect inconsistent nutritional intakes d/t altered mentation/lethary as evidenced by Wt loss of 17 lbs (9% loss) x past 4 weeks.    Evaluation: No change    CURRENT NUTRITION DIAGNOSIS  Unintended weight loss related to inadequate nutriitonal intakes likely d.t ongoing altered mentation/lethargy, lack of motivation/appetite to eat as evidenced by Wt loss of 17 lbs (9% loss) x past 4 weeks PTA and now with addition loss of 4.3 kg (5% loss) x past week,  ave po intakes per Calorie Counts x 3 days (1/6-8) meeting approximatley 40% of pt's estimated needs and minimal po intakes yesterday and today.      INTERVENTIONS  Implementation  Reviewed results of calorie count collection with pt's spouse and ongoing wt loss. Emphasized need to encourage pt to eat more consistently vs need for enteral nutrition.     Goals  Initiation of nutrition support within 2-3 days.    Monitoring/Evaluation  Progress toward goals will be monitored and evaluated per protocol.    Shanice Benz RD,LD  Pager [phone]

## 2018-01-10 NOTE — PROGRESS NOTES
[facility]; [id number]  [facility]-started after Noon  MD: Constantine

## 2018-01-10 NOTE — PLAN OF CARE
Problem: Patient Care Overview  Goal: Plan of Care/Patient Progress Review  PT 7D  Discharge Planner PT   Patient plan for discharge: home with assist  Current status: pt declined walking. Pt very lethargic this pm. Pt started performing LE exercises on LLE. Pt fell asleep and PT could not wake him up to do exercises with RLE.  Barriers to return to prior living situation: pt is weak and not moving independently. Pt lethargic at times and just sleeping  Recommendations for discharge: TCU  Rationale for recommendations: pt will benefit from getting stronger and walking long distances and being Indep in transfers to go home.       Entered by: Neris Gutierrez 01/10/2018 4:54 PM

## 2018-01-10 NOTE — PROGRESS NOTES
Nephrology Chart Review:   Creat continues to slowly decline.   Remains non oliguric  -140/ Afebrile  K 3.3, Ca 10.  Remains on IVF  Remains encephalopathic    - Continue IVF until closer to d/c then OK to give one dose of Pamidronate 60 mg po.     - Would not restart Zometa until renal function normalizes

## 2018-01-10 NOTE — PLAN OF CARE
Afebrile, vss. BG 65 this AM pt refusing PO intake, IVMF changed to include D5W. Pt is still forgetful at times, Aox4 otherwise. Pt still needs UC done. One time K given for 3.3, recheck in AM. Per CRIS no need for mag replacement today. Poor appetite continues, had 1/2 ensure shake and 1/2 peanut butter sandwich today. Spouse at bedside. Continue with POC.

## 2018-01-10 NOTE — PROCEDURES
EEG#:  -1      DAY #1 OF VIDEO EEG MONITORING      DATE OF RECORDIN2018.      SOURCE FILE DURATION:  6 hours 28 minutes and 2 seconds.      CLINICAL SUMMARY:  Mr. Jeffrey Real is a 52-year-old with multiple myeloma, acute kidney injury and encephalopathy.  He has recently had unexplained episodes of delirium. This EEG is being done in evaluation of seizures.      MEDICATIONS:  citalopram, lorazepam.     TECHNICAL SUMMARY:  The continuous EEG monitoring procedure was performed with 23 scalp electrodes in the 10-20 system placement.  Additional scalp, precordial and other surface electrodes were used for electrical referencing and artifact detection.  Video monitoring was utilized and periodically reviewed by the EEG technologist and the physician for electroclinical correlation.      BACKGROUND:  During maximal waking, the patient has a poorly sustained symmetric 9 Hz posterior dominant rhythm.  This appeared to be reactive to eye opening.  Faster activities were present primarily in the anterior leads.  There is an excessive amount of theta frequency slowing and occasional delta during waking.  At times the study was difficult to interpret due to myogenic artifact.  Drowsiness is manifested by dropout of the posterior dominant rhythm.  Sleep was not recorded on this day of monitoring.      INTERICTAL EPILEPTIFORM DISCHARGES:  None.      ACTIVATING PROCEDURES:  None.      ICTAL RECORDINGS:  No electrographic seizures or paroxysmal behavioral events were recorded on this day of monitoring.      IMPRESSION:  This EEG is abnormal due to the presence of excessive theta and occasional delta slowing during waking.  This is consistent with a diagnosis of mild to moderate encephalopathy.  There is no evidence of electrographic seizures and the study rules out nonconvulsive status epilepticus.  Clinical correlation is advised.        I personally reviewed the video EEG and agree with the reported findings.      YOANNA MÉNDEZ MD       As dictated by KANIKA IBRAHIM MD   Clinical Neurophysiology Fellow             D: 01/10/2018 12:30   T: 01/10/2018 13:19   MT: [id number]      Name:     LIO BHATIA   MRN:      [id number]        Account:        [id number]   :      1965           Procedure Date: 2018      Document: [id number]

## 2018-01-10 NOTE — PROCEDURES
EEG#:  -2       Day 2 of Video EEG Monitoring      DATE OF RECORDIN/10/2018       SOURCE FILE DURATION:  5 hours, 57 minutes and 56 seconds.      CLINICAL SUMMARY:  Mr. Jeffrey Real is a 52-year-old male with history of multiple myeloma and acute kidney injury and encephalopathy. He had recent unexplained delirium. This video EEG is being done to evaluate for seizures.      MEDICATIONS:  citalopram and lorazepam.       TECHNICAL SUMMARY:  The continuous EEG monitoring procedure was performed with 23 scalp electrodes in the 10-20 system placement.  Additional scalp, precordial and other surface electrodes were used for electrical referencing and artifact detection.  Video monitoring was utilized and periodically reviewed by the EEG technologist and the physician for electroclinical correlation.      BACKGROUND:  There is predominance of theta frequency activity and occasional delta.  The patient appears to be drowsy.  The patient does not achieve sleep on this day of monitoring.      INTERICTAL EPILEPTIFORM DISCHARGES:  None.      ACTIVATING PROCEDURES:  None.      ICTAL RECORDING:  No electrographic seizures or paroxysmal behavioral events on the day of recording.      IMPRESSION:  There is diffuse theta and occasional delta slowing, however the patient seems to be drowsy.  Therefore slowing is perhaps due to drowsiness.  No epileptiform discharges were present.  Notably, there were no electrographic seizures.  Clinical correlation is advised.      SUMMARY OF 2 DAYS OF VIDEO EEG MONITORING:  Video EEG was abnormal, on day 1 there was excessive theta and some delta frequency slowing, consistent with mild to moderate diffuse nonspecific encephalopathy.  No epileptiform discharges were present.  The patient was awake and drowsy during the majority of the study, but did not achieve sleep. No electrographic seizures or paroxysmal behavioral events were recorded on these 2 days of the recording.  Clinical  correlation is advised.        I personally reviewed the video EEG and agree with the reported findings.     YOANNA MÉNDEZ MD       As dictated by KANIKA IBRAHIM MD   Clinical Neurophysiology Fellow             D: 01/10/2018 12:33   T: 01/10/2018 13:08   MT: ZACH      Name:     LIO BHATIA   MRN:      [id number]        Account:        [id number]   :      1965           Procedure Date: 01/10/2018      Document: [id number]

## 2018-01-10 NOTE — PLAN OF CARE
Problem: Patient Care Overview  Goal: Plan of Care/Patient Progress Review  Outcome: No Change  BP max 151/92; pt was restless and agitated at this time. Other VSS on RA. Complaints of lower back and knee pain managed with 10 mg PO oxy x1 (additional 5 mg given at change of shift due to no change in pain). Disoriented to time and place, continues to have hallucinations. 0.5 mg IV haldol and 0.5 mg IV ativan given x1 for restlessness and agitation. Agitation began after placement of vEEG electrodes, pt pulled off 7 electrodes despite constant reminders to leave them in place. Initiated bedside attendant per neurology resident, as pt was not redirectable even with staff in the room (so VPM would have been unsuccessful). C diff negative. No BM this shift. Voiding adequately. Bed alarm remains on for safety, along with 1:1 attendant. Up with 1 assist + walker. Continue with POC.

## 2018-01-10 NOTE — PLAN OF CARE
Problem: Patient Care Overview  Goal: Plan of Care/Patient Progress Review  OT 7D: Attempted this AM but unable to arouse patient despite touching shoulder and calling out name multiple times. Per spouse, pt with increased lethargy today. Will reschedule for tomorrow.

## 2018-01-10 NOTE — PLAN OF CARE
Problem: Renal Failure/Kidney Injury, Acute (Adult)  Goal: Signs and Symptoms of Listed Potential Problems Will be Absent, Minimized or Managed (Renal Failure/Kidney Injury, Acute)  Signs and symptoms of listed potential problems will be absent, minimized or managed by discharge/transition of care (reference Renal Failure/Kidney Injury, Acute (Adult) CPG).     VSS, afebrile. Disorientated to time & place. Overnight moonlighter paged about agitation, blood sugars, & chest pain/EKG. In the beginning of the shift patient was agitated & uncomfortable w/ EEG monitoring, pt needing a lot of redirection & interventions. Tried hand mitts, but seemed to make patients agitation worse. From shift change report, when neurology had to replace the EEG monitoring on evening, they promoted that the EEG monitoring wasn't the greatest priority if pt were to remove leads overnight. Patient was easily redirected when presenting oneself in a calm manner & compromising w/ patient; while keeping pt's safety first. But eventually patient did start taking the EEG setup off his head. At one point patient was adamant about washing his head/showering and would not wait for morning. EEG was already compromised, so took off the rest of the connection using adhesive remover. BG check was 53, so started hypoglycemia coverage. Pt was able to shower after EEG was removed and patient was drastically more calm afterwards. Pt drank a total of around 4 apple juice containers, but eventually declined eating or drinking anything further; BG was 88 @ that time. Pt had 2 BMs overnight. This morning while on the toilet, pt started to experience chest pain. Declined SOB and other symptoms besides discomfort. Pt still had the chest pain after getting settled into bed rating it 9 out of 10. Stat EKG done. Shortly after EKG, patient stated he had no pain. Didn't feel ativan/haldol was necessary overnight. Continue to monitor & w/ POC.

## 2018-01-10 NOTE — MR AVS SNAPSHOT
After Visit Summary   1/10/2018    Jeffrey Real    MRN: [id number]           Patient Information     Date Of Birth          1965        Visit Information        Provider Department      1/10/2018 7:00 AM [facility] EEG TECH 4 [facility] EEG        Today's Diagnoses     Multiple myeloma in relapse (H)    -  1       Follow-ups after your visit        Who to contact     Please call your clinic at [phone] to:    Ask questions about your health    Make or cancel appointments    Discuss your medicines    Learn about your test results    Speak to your doctor   If you have compliments or concerns about an experience at your clinic, or if you wish to file a complaint, please contact [facility] Patient Relations at [phone] or email us at [email or web address]         Additional Information About Your Visit        MyChart Information     [employer] gives you secure access to your electronic health record. If you see a primary care provider, you can also send messages to your care team and make appointments. If you have questions, please call your primary care clinic.  If you do not have a primary care provider, please call [phone] and they will assist you.      Profile[employer]er is an electronic gateway that provides easy, online access to your medical records. With Profile[employer]er, you can request a clinic appointment, read your test results, renew a prescription or communicate with your care team.     To access your existing account, please contact your [facility] or call [phone] for assistance.        Care EveryWhere ID     This is your Care EveryWhere ID. This could be used by other organizations to access your [place] medical records  [id number]         Blood Pressure from Last 3 Encounters:   01/10/18 138/84   11/27/17 120/79   12/03/15 118/80    Weight from Last 3 Encounters:   01/09/18 75.7 kg (166 lb 14.4 oz)   11/27/17 83.8 kg (184 lb 11.2  oz)   12/03/15 97.3 kg (214 lb 8.1 oz)              We Performed the Following     EEG     Glucose by meter     Glucose by meter     Glucose by meter          Today's Medication Changes      Notice     This visit is during an admission. Changes to the med list made in this visit will be reflected in the After Visit Summary of the admission.             Primary Care Provider Office Phone # Fax #    Hipolito Rollins [phone]       [facility] [address]  [facility] [address]        Equal Access to Services     NALINI MCKEON : Hadii aad ku hadasho Soomaali, waaxda luqadaha, qaybta kaalmada adeegyada, waxay idiin hayaan adeeg estrella crawley . So [facility] [phone].    ATENCIÓN: Si habla español, tiene a guardado disposición servicios gratuitos de asistencia lingüística. [facility] [phone].    We comply with applicable federal civil rights laws and [place] laws. We do not discriminate on the basis of race, color, national origin, age, disability, sex, sexual orientation, or gender identity.            Thank you!     Thank you for choosing [facility]  for your care. Our goal is always to provide you with excellent care. Hearing back from our patients is one way we can continue to improve our services. Please take a few minutes to complete the written survey that you may receive in the mail after your visit with us. Thank you!             Your Updated Medication List - Protect others around you: Learn how to safely use, store and throw away your medicines at www.disposemymeds.org.      Notice     This visit is during an admission. Changes to the med list made in this visit will be reflected in the After Visit Summary of the admission.

## 2018-01-11 ENCOUNTER — APPOINTMENT (OUTPATIENT)
Dept: OCCUPATIONAL THERAPY | Facility: CLINIC | Age: 53
DRG: 871 | End: 2018-01-11
Attending: INTERNAL MEDICINE
Payer: COMMERCIAL

## 2018-01-11 LAB
ANION GAP SERPL CALCULATED.3IONS-SCNC: 11 MMOL/L (ref 3–14)
ANISOCYTOSIS BLD QL SMEAR: SLIGHT
BACTERIA SPEC CULT: NORMAL
BASOPHILS # BLD AUTO: 0 10E9/L (ref 0–0.2)
BASOPHILS NFR BLD AUTO: 0 %
BUN SERPL-MCNC: 25 MG/DL (ref 7–30)
CALCIUM SERPL-MCNC: 9.7 MG/DL (ref 8.5–10.1)
CHLORIDE SERPL-SCNC: 100 MMOL/L (ref 94–109)
CO2 SERPL-SCNC: 27 MMOL/L (ref 20–32)
CREAT SERPL-MCNC: 3.17 MG/DL (ref 0.66–1.25)
DIFFERENTIAL METHOD BLD: ABNORMAL
EOSINOPHIL # BLD AUTO: 0 10E9/L (ref 0–0.7)
EOSINOPHIL NFR BLD AUTO: 0.9 %
ERYTHROCYTE [DISTWIDTH] IN BLOOD BY AUTOMATED COUNT: 17.2 % (ref 10–15)
GFR SERPL CREATININE-BSD FRML MDRD: 21 ML/MIN/1.7M2
GLUCOSE SERPL-MCNC: 94 MG/DL (ref 70–99)
HCT VFR BLD AUTO: 25.9 % (ref 40–53)
HGB BLD-MCNC: 8.3 G/DL (ref 13.3–17.7)
LYMPHOCYTES # BLD AUTO: 0.1 10E9/L (ref 0.8–5.3)
LYMPHOCYTES NFR BLD AUTO: 3.6 %
MACROCYTES BLD QL SMEAR: PRESENT
MAGNESIUM SERPL-MCNC: 1.8 MG/DL (ref 1.6–2.3)
MCH RBC QN AUTO: 30.5 PG (ref 26.5–33)
MCHC RBC AUTO-ENTMCNC: 32 G/DL (ref 31.5–36.5)
MCV RBC AUTO: 95 FL (ref 78–100)
MONOCYTES # BLD AUTO: 0 10E9/L (ref 0–1.3)
MONOCYTES NFR BLD AUTO: 0.9 %
MYELOCYTES # BLD: 0 10E9/L
MYELOCYTES NFR BLD MANUAL: 1.8 %
NEUTROPHILS # BLD AUTO: 1.5 10E9/L (ref 1.6–8.3)
NEUTROPHILS NFR BLD AUTO: 92.8 %
NRBC # BLD AUTO: 0 10*3/UL
NRBC BLD AUTO-RTO: 1 /100
PHOSPHATE SERPL-MCNC: 4.8 MG/DL (ref 2.5–4.5)
PLATELET # BLD AUTO: 52 10E9/L (ref 150–450)
PLATELET # BLD EST: ABNORMAL 10*3/UL
POLYCHROMASIA BLD QL SMEAR: SLIGHT
POTASSIUM SERPL-SCNC: 3.4 MMOL/L (ref 3.4–5.3)
RBC # BLD AUTO: 2.72 10E12/L (ref 4.4–5.9)
ROULEAUX BLD QL SMEAR: ABNORMAL
SODIUM SERPL-SCNC: 138 MMOL/L (ref 133–144)
SPECIMEN SOURCE: NORMAL
URATE SERPL-MCNC: 7.5 MG/DL (ref 3.5–7.2)
WBC # BLD AUTO: 1.6 10E9/L (ref 4–11)

## 2018-01-11 PROCEDURE — 83735 ASSAY OF MAGNESIUM: CPT | Performed by: NURSE PRACTITIONER

## 2018-01-11 PROCEDURE — 25800025 ZZH RX 258: Performed by: PHYSICIAN ASSISTANT

## 2018-01-11 PROCEDURE — 12000008 ZZH R&B INTERMEDIATE UMMC

## 2018-01-11 PROCEDURE — 84550 ASSAY OF BLOOD/URIC ACID: CPT | Performed by: NURSE PRACTITIONER

## 2018-01-11 PROCEDURE — 25000132 ZZH RX MED GY IP 250 OP 250 PS 637: Performed by: NURSE PRACTITIONER

## 2018-01-11 PROCEDURE — 80048 BASIC METABOLIC PNL TOTAL CA: CPT | Performed by: NURSE PRACTITIONER

## 2018-01-11 PROCEDURE — 25000125 ZZHC RX 250: Performed by: NURSE PRACTITIONER

## 2018-01-11 PROCEDURE — 97535 SELF CARE MNGMENT TRAINING: CPT | Mod: GO | Performed by: OCCUPATIONAL THERAPIST

## 2018-01-11 PROCEDURE — 40000133 ZZH STATISTIC OT WARD VISIT: Performed by: OCCUPATIONAL THERAPIST

## 2018-01-11 PROCEDURE — 84100 ASSAY OF PHOSPHORUS: CPT | Performed by: NURSE PRACTITIONER

## 2018-01-11 PROCEDURE — 85025 COMPLETE CBC W/AUTO DIFF WBC: CPT | Performed by: NURSE PRACTITIONER

## 2018-01-11 PROCEDURE — 25000132 ZZH RX MED GY IP 250 OP 250 PS 637

## 2018-01-11 RX ADMIN — DILTIAZEM HYDROCHLORIDE 60 MG: 60 TABLET, FILM COATED ORAL at 19:44

## 2018-01-11 RX ADMIN — DEXTROSE AND SODIUM CHLORIDE: 5; 450 INJECTION, SOLUTION INTRAVENOUS at 23:52

## 2018-01-11 RX ADMIN — DILTIAZEM HYDROCHLORIDE 60 MG: 60 TABLET, FILM COATED ORAL at 08:21

## 2018-01-11 RX ADMIN — CITALOPRAM HYDROBROMIDE 40 MG: 20 TABLET ORAL at 08:21

## 2018-01-11 RX ADMIN — Medication 2 G: at 13:07

## 2018-01-11 RX ADMIN — PROCHLORPERAZINE MALEATE 10 MG: 5 TABLET, FILM COATED ORAL at 13:10

## 2018-01-11 RX ADMIN — DEXTROSE AND SODIUM CHLORIDE: 5; 450 INJECTION, SOLUTION INTRAVENOUS at 04:57

## 2018-01-11 RX ADMIN — PANTOPRAZOLE SODIUM 40 MG: 40 TABLET, DELAYED RELEASE ORAL at 08:21

## 2018-01-11 ASSESSMENT — PAIN DESCRIPTION - DESCRIPTORS: DESCRIPTORS: DISCOMFORT

## 2018-01-11 NOTE — PLAN OF CARE
Problem: Patient Care Overview  Goal: Plan of Care/Patient Progress Review  Outcome: No Change    AVSS, afebrile. Denies pain. Pt disoriented to place, time and situation; no agitation overnight. No hallucinations noted overnight. Pt able to sleep between cares. Bed alarm on for safety, pt not using call light. Voiding well. Wife at bedside. Cont POC.

## 2018-01-11 NOTE — PROGRESS NOTES
[facility], [place]  Hematology / Oncology Progress Note     Assessment & Plan   Jeffrey Real is a 53 y/o M with complex PMH including refractory IgA Multiple Myeloma s/p autologous transplant 08/2014, most recently on Daralex/Pom/Decadron (started 11/17), transferred from OSH d/t CHRISTIANE on CKD, persistent sinusitis, HCAP, concern for sepsis, & altered mental status.      # Delirium vs encephalopathy. On admit very lethargic/sleepy on exam. Hyperreflexic. Clonus. Likely multifactorial (kidney injury, long acting pain medications, hypercalcemia, infectious-sinusitis/PNA). Mental status waxing/waning over past few days.  - Head CT w/o contrast shows no evidence of acute intracranial pathology but + sinusitis & mild chronic small vessel ischemic disease.  - Held PTA oxycodone, dilaudid, ativan & gabapentin d/t potential sedating effects/CHRISTIANE, no withdrawal symptoms. Given reports of pain, added back oxycodone 5-10mg every 4h as needed.  - Waxing and waning, slightly improved today. Continue with video patient monitoring when wife not at bedside.  - D/c'd PRN Ativan. QTc 456, using Haldol cautiously (0.5-1mg every 6 hours PRN).   - SLUMs score 20/30 per OT (abnormal)  - Neurology consulted, appreciate their input and recommendations. Video EEG obtained to r/o subclinical seizures. Difficult study given agitation, but indicates mild-to-moderate encephalopathy with no evidence of seizures seen.  - Hyperammonemic on admission, with normal liver function, so not routinely trended thereafter (70-->92-->95). Level re-checked on 1/11, slightly elevated at 65.  - Surveillance blood cultures drawn, urine culture recollected (negative when done on 1/2).   - May need LP to r/o meningeal involvement of myeloma. Unable to obtain brain MRI secondary to CHRISTIANE.  - Continue to hold acyclovir in the event it is exacerbating/contributing to AMS given impaired renal function.  - Continue aggressive IV hydration per  nephrology recs and continue to follow SCr trend.      # High Risk/Resistant Multiple Myeloma IgA kappa. S/P autologous peripheral blood stem cell transplant in 08/2014.   # Multiple lytic lesions 2/2 MM (ribs, T7, T9, T11, T12) s/p XRT to thoracolumbar spine.  # Fracture L ischium.  # Hypogammaglobulinemia s/p IVIG replacement.  S/P multiple lines of therapy including auto tx (RVD, auto+melphalan, Velcade Maintenance, VDT-PACE, followed by PCD) complex cytogenetics (17 p deletion, p53 deletion) most recently on pomalidomide/carfilzomib & dex 20+ cycles (started 02/2017) with progression in November 2017. Most recently switched to daratumumab/pomalidomide/dex.  - Plan was to try to get M-spike < 1 gram% then pursue Allogeneic transplant (saw Dr. Torre in clinic 11/27/17). Has brother as haplo-identical donor.  - 12/26/17 found to have pathologic fractures of T2 spinous process & bilateral T1 transverse process. Destructive lytic lesion involving left scapula (partially imaged).   - S/p dex and daratumumab on 1/6/18, holding Pomalyst. Per wife, this was his 3rd dose of daratumumab since initiating in November 2017. Myeloma labs from this admission compared to prior labs from Nov 2017 at OSH, ? Slightly worse.      # Pancytopenia 2/2 to MM & related therapy. Recommended dose decrease with subsequent cycles.  - Keep HgB > 8, PLT > 10.       # Acute Kidney Injury/Failure 2/2 to ATN. Cr 4.5 at OSH (up from baseline 0.8-1.1). Likely r/t contrast (got facial bone contrast CT on 12/27 at OSH, + underlying MM) vs. worsening MM vs. Sepsis/infection. No evidence of hypotension in OSH records. Unable to see if he was anuric at OSH.  # Hyperkalemia --> RESOLVED  # Hyperphosphatemia --> improving  # Hyperuricemia --> improving  # Chronic kidney disease 2/2 to MM.  # Hx. CHRISTIANE requiring HD from sepsis.  - Avoid nephrotoxins, contrast, renally dose medications.  - Nephrology consulted, appreciate their input and assistance.  Continue hydration with D5+ 1/2NS @ 125ml/hr. No Lasix unless signs/symptoms of fluid overload.  - BMP daily. Cr improved to 3.17 today.      # Hypercalcemia. Last dose of Zometa was 12/7/17, on monthly schedule.  - Due for Zometa but holding given CHRISTIANE. Ionized calcium normalized, 5.0 on 1/9. Continue to follow. No plans to give Zometa or pamidronate currently (may consider giving pamidronate prior to discharge).    #Hypoglycemia.  -Noted intermittently. Reviewed records from OSH through CareEverywhere, appears to have occurred PTA as well. ? Due to CHRISTIANE. Continue to monitor, hypoglycemia protocol in place. Added D5 back to IV fluids with noted improvement. Consider endocrinology consult.    #Prolonged QTc  -Level on admission at 471. Repeat EKG 1/9 is 455. D/c'd Zofran, using Haldol with caution as above. Past EKGs show interval normal 8/2017, first prolonged on 12/25.      # HCAP & Sinusitis.   # Fever without neutropenia.  # RSV  # Hypogammaglobulinemia.  Streaky R lung base opacity. S/P multiple hospitalizations. Most recently 12/11-12/21, 12/25-12/30, & now 1/1- current (transfer from OSH). CT sinus + for sinusitis. Head CT 1/2/18 showing persistent sinusitis.  - Urine & blood CX NGTD from OSH. Unable to collect sputum culture.   - On Zosyn 1/2-1/4. Abx stopped given positive RSV, and negative cultures.  - Repeat blood culture & UA/UC ordered (NGTD, continue to monitor).  - ID consulted, appreciate their input. No ribavirin given for RSV d/t CHRISTIANE. Infectious workup otherwise negative.  - Given sucrose-free IVIG on 1/8.     # ID PPX. S/p Pentamidine 1/5/17. Acyclovir on hold per above (per guidelines, dose should be reduced from 400mg BID to 200mg BID if CrCl <10, but holding at this time to see if delirium improves).      # GERD, hx. Candida esophagitis.  - Protonix 40mg daily.      # Hx. Vtach s/p ICD placement.  # Hx. HTN.  - Diltiazem 60 mg BID initially held due to need for close monitoring of BPs for  possible sepsis. Restarted 1/5/17, BPs stable.   - Remote history of Metoprolol use but not seen in last 2 hospitalization/clinic notes from December.    # Chronic pain 2/2 to MM. Multiple fractures/lytic lesions.  Held PTA meds d/t encephalopathy (OxyContin, Dilaudid).   - Concern for pain to legs/back as above. Started PRN oxycodone per above.      # Peripheral neuropathy 2/2 to MM therapy. Holding PTA gabapentin.  - Continue to hold at this time.    # Major depression. Continue PTA Celexa.    # Weakness/deconditioning 2/2 to multiple hospitalizations, MM/therapy, & chronic disease.  # Chronic fatigue.  - PT/OT consult.   - TCU vs home with 24h assist. SW aware. Continue to follow recs.    # Diarrhea  -C.diff negative.    # Mild-Moderate Malnutrition 2/2 to chronic disease/infections, multiple hospitalizations.  - Regular diet, encourage snacks/supplements.  - Juan Jose counts initiated (1/5).     FEN:   - MIVF with D5 + 1/2NS at 125ml/hr per nephrology team.  - Follow lytes closely, replace PRN  - Regular diet as tolerated - consider repeating calorie counts with improvement in mental status.     PPX (DVT/GI): protonix daily, ambulate   LINES/DRAINS: Port.  CONSULTS: Nephrology, ENT, OT, PT, ID.   CODE: Full.  DISPO: Discharge pending ability to place at TCU, continued improvement in mental status, and SCr trend.    Patient and plan of care discussed with staff attending, Dr. Guevara.    IVETTE YousifC  Hematology/Oncology  [phone]    Interval History   David seen with wife at bedside. Wife reports David's night went much better. David is more alert today, still confused but able to interact and answer questions. Still with poor PO intake. Pain minimal, last dose of oxycodone was last night.     Physical Exam   Temp: 96.6  F (35.9  C) Temp src: Oral BP: 149/86 Pulse: 92 Heart Rate: 94 Resp: 18 SpO2: 98 % O2 Device: None (Room air)    Vitals:    01/08/18 0900 01/09/18 0750 01/11/18 1159   Weight: 78 kg (172 lb)  75.7 kg (166 lb 14.4 oz) 77.2 kg (170 lb 3.2 oz)     Vital Signs with Ranges  Temp:  [95.6  F (35.3  C)-97.9  F (36.6  C)] 96.6  F (35.9  C)  Pulse:  [92] 92  Heart Rate:  [] 94  Resp:  [18] 18  BP: (133-149)/(81-87) 149/86  SpO2:  [92 %-98 %] 98 %  I/O last 3 completed shifts:  In: 3130 [P.O.:110; I.V.:3020]  Out: 3100 [Urine:3100]    Constitutional: Seen lying in bed, somnolent.  Respiratory: No increased work of breathing, good air exchange.  Cardiovascular: Regular rate and rhythm. No murmur.  Abdominal: + bowel sounds, soft, non-tender, non-distended.  Musculoskeletal: No LE edema bilaterally.  Neuropsychiatric: Alert, awake. Oriented to self, wife. Not oriented to place or time, but knows he is not at home. No focal neurologic deficits, cranial nerves intact as tested.    Medications     dextrose 5% and 0.45% NaCl 125 mL/hr at 01/11/18 0457     - MEDICATION INSTRUCTIONS -         pantoprazole  40 mg Oral QAM AC     diltiazem  60 mg Oral BID     sodium chloride (PF)  20 mL Intracatheter Q8H     heparin lock flush  5-10 mL Intracatheter Q24H     heparin  5 mL Intracatheter Q28 Days     citalopram (celeXA) tablet 40 mg  40 mg Oral Daily       Data   Results for orders placed or performed during the hospital encounter of 01/02/18 (from the past 24 hour(s))   CBC with platelets differential   Result Value Ref Range    WBC 1.6 (L) 4.0 - 11.0 10e9/L    RBC Count 2.72 (L) 4.4 - 5.9 10e12/L    Hemoglobin 8.3 (L) 13.3 - 17.7 g/dL    Hematocrit 25.9 (L) 40.0 - 53.0 %    MCV 95 78 - 100 fl    MCH 30.5 26.5 - 33.0 pg    MCHC 32.0 31.5 - 36.5 g/dL    RDW 17.2 (H) 10.0 - 15.0 %    Platelet Count 52 (L) 150 - 450 10e9/L    Diff Method Manual Differential     % Neutrophils 92.8 %    % Lymphocytes 3.6 %    % Monocytes 0.9 %    % Eosinophils 0.9 %    % Basophils 0.0 %    % Myelocytes 1.8 %    Nucleated RBCs 1 (H) 0 /100    Absolute Neutrophil 1.5 (L) 1.6 - 8.3 10e9/L    Absolute Lymphocytes 0.1 (L) 0.8 - 5.3 10e9/L     Absolute Monocytes 0.0 0.0 - 1.3 10e9/L    Absolute Eosinophils 0.0 0.0 - 0.7 10e9/L    Absolute Basophils 0.0 0.0 - 0.2 10e9/L    Absolute Myelocytes 0.0 0 10e9/L    Absolute Nucleated RBC 0.0     Anisocytosis Slight     Polychromasia Slight     Macrocytes Present     Rouleaux Increased     Platelet Estimate Confirming automated cell count    Basic metabolic panel   Result Value Ref Range    Sodium 138 133 - 144 mmol/L    Potassium 3.4 3.4 - 5.3 mmol/L    Chloride 100 94 - 109 mmol/L    Carbon Dioxide 27 20 - 32 mmol/L    Anion Gap 11 3 - 14 mmol/L    Glucose 94 70 - 99 mg/dL    Urea Nitrogen 25 7 - 30 mg/dL    Creatinine 3.17 (H) 0.66 - 1.25 mg/dL    GFR Estimate 21 (L) >60 mL/min/1.7m2    GFR Estimate If Black 25 (L) >60 mL/min/1.7m2    Calcium 9.7 8.5 - 10.1 mg/dL   Uric acid   Result Value Ref Range    Uric Acid 7.5 (H) 3.5 - 7.2 mg/dL   Magnesium   Result Value Ref Range    Magnesium 1.8 1.6 - 2.3 mg/dL   Phosphorus   Result Value Ref Range    Phosphorus 4.8 (H) 2.5 - 4.5 mg/dL       Staff Addendum: Patient was seen and examined by me. All labs, VS and pertinent images were reviewed with the team on rounds. Plans for care were mutually developed and outlined above with my direct input.    Interval Events/ROS: Much more alert today. Answering questions though not always correctly. Following most commands well. No obvious localizing s/s or asymmetry on neuro exam. Poor po intake.      Exam: Knew name, that he was at [facility] , his wife. Not date. NAD, cooperative with exam. OP dry today. Lungs were clear, HRRR, Abd soft/NT/ND, no HSM or masses, No LE edema, Nro grossly non-focal, Skin exam was without rash.    A/P: 53yo M with IgA MM, relapsed after auto in 2014, here with new onset CHRISTIANE and waxing/waning MS.  - MOntor given that he is better today. We stopped ACV yesterday and we'll monitor pt off that medication today.   - Will need to monitor PO intake given that he's not eating well.   - Await renal and neuro  recs. Will hold off LP for now.   - Rest as above.     Tony Guevara, DO

## 2018-01-11 NOTE — PLAN OF CARE
Problem: Patient Care Overview  Goal: Plan of Care/Patient Progress Review  Discharge Planner OT   Patient plan for discharge: Pt would like to discharge home; spouse would like pt to complete rehab stay  Current status: Pt able to complete full shower with min A and significant cuing for sequencing and performing tasks thoroughly; completed total body dressing with CGA-min A.   Barriers to return to prior living situation: weakness, impaired cognition, decreased balance  Recommendations for discharge: TCU - if pt refuses will need 24/7 assist for all transfers/mobility/ADLs and home OT/PT  Rationale for recommendations: to increase pt's (I) with ADL/IADLs       Entered by: Mai Foster 01/11/2018 3:48 PM

## 2018-01-11 NOTE — PROGRESS NOTES
Nephrology Progress Note  01/11/2018       Jeffrey Real is a 52 year old male with known relapsed MM on chemo, recent hospital admission for sepsis, discharged to home on 12/30 on a Zpack and readmitted on 1/1/18. Transferred from OSH to [facility] on 1/2/18 for AMS, electrolyte abnormalities and CHRISTIANE.        ASSESSMENT AND RECOMMENDATIONS:       1. CHRISTIANE - Creat declining, down to 3.1 today from peak of 4.78 on 1/4/18. He is non oliguric.   Etiology felt to be ATN given the combination of high protein from Myeloma, hypercalcemia and contrast exposure which likely resulted in precipitation in his tubules causing obstruction/ATN. Michel 109 and Fena 16.1 suggesting ATN.    He does have h/o CHRISTIANE requiring RRT in Feb 2017. He did have a mild CHRISTIANE in Nov with peak creat of 1.6 on 11/25. Unfortunately did not have another creat drawn following the contrast on 12/27/17.   - Will need to be followed closely upon discharge   - Wife and patient agreeable to RRT if renal function were to deteriorate   - Continue to monitor renal function for recovery with daily chemistry labs   - Avoid nephrotoxins, hypotension   - Renal dose medications      2. Volume status - Appears euvolemic. No edema, hypoxia. Oral intake is marginal. Has been on IVF since admission. Currently on IVF at 125/hr. Blood pressure is 130-140/. UO 3350 cc over last 24 hrs. Weight today is 77.2 kg. Admission weight 80.0 kg   - Continue IVF at 125/hr. If we stop his Ca will increase   - No need for additional Lasix at this time   - Standing daily weights   - Strict I/O      3. Electrolytes - No acute concerns. K 3.4, Na 138      4. Acid base - Had been acidotic throughout his admission. Bicarb 27 today despite discontinuing IV Bicarb yesterday.       - Continue to monitor bicarb      5. BMD - Hypercalemia is chronic, however, more elevated for several days prior to admission.  Dehydration may have been making this worse given improvement with rehydration. Ica 5.0 1/10  with ongoing IVF. He was taking Ca/D at home, but denies additional Ca. Has known h/o lytic lesions. He receives Zometa monthly. Last dose 12/7/17   - OK to give one dose of Pamidronate 60 mg before discharge with close f/u, however, if he remains in the hospital would just continue the IVF and hold on the pamidronate until closer to d/c. If the IVF is stopped his calcium is going to rise again which is contributing to is CHRISTIANE.    -  Would not give Zometa until renal function normalizes given long duration of action and risk for jaw osteonecrosis.    - Phos 4.8      6. Anemia - Hgb 8.3. Has known pancytopenia 2/2 MM and chemo. Hgb goal is > 8. Last RBC 1/8   - Transfusions per Oncology      7.  Multiple Myeloma - Per primary team. No h/o Myeloma kidney. Total protein 11.2 on 1/9. Has been in the 10-11 range since 9/17. Albumin however is much lower than usual. Bili and enzymes normal. LD is 464. Uric acid up to 7.5.       9. HTN -Well controlled. -140/. HR 80-90's. PTA meds: Diltiazem 60 mg bid.    - Continue Diltiazem w/ hold parameters      10. Sepsis? - Was having fevers. No Source identified. All Cxs NTD. Acyclovir on hold to r/o as cause for ongoing AMS.     11. Disposition - Nephrology will sign off given ongoing improvement in renal function. Would continue IVF until close to d/c then give Pamidronate 60 mg x 1. Should have close f/u upon discharge to monitor renal function. We would be glad to follow or a Nephrologist closer to his home.        Recommendations were communicated to primary team via progress note      Humaira Crisostomo, NP   [phone]      Seen and discussed with Dr. Vidal      Interval History :       Creat continues to decline  Non oliguric  Wife at bedside and having vertigo  Interval progress notes, imaging studies and labs reviewed      Review of Systems:   I reviewed the following systems:  GI: Not eating much. No abdominal pain.   Neuro:  More alert today  Constitutional:  " afebrile  CV: Denies dyspnea, CP or edema.  : Voiding w/o martinez    Physical Exam:   I/O last 3 completed shifts:  In: 3130 [P.O.:110; I.V.:3020]  Out: 3100 [Urine:3100]   /86  Pulse 92  Temp 96.6  F (35.9  C) (Oral)  Resp 18  Ht 1.778 m (5' 10\")  Wt 77.2 kg (170 lb 3.2 oz)  SpO2 98%  BMI 24.42 kg/m2     GENERAL APPEARANCE: Alert, interactive. Wife present  EYES: no scleral icterus, pupils equal  Pulmonary: lungs clear to auscultation. Breathing is non labored. Not on supplemental oxygen.   CV: Tachy   - Edema - none  GI: soft, nontender, non distended  MS: no evidence of inflammation in joints, no muscle tenderness  : voiding w/o martinez  NEURO: Alert, oriented to year, month, but not location.     Labs:   All labs reviewed by me  Electrolytes/Renal -   Recent Labs   Lab Test  01/11/18   0708  01/10/18   0600  01/09/18   0536   NA  138  137  134   POTASSIUM  3.4  3.3*  3.9   CHLORIDE  100  97  98   CO2  27  29  26   BUN  25  30  34*   CR  3.17*  3.49*  3.66*   GLC  94  65*  66*   PARADISE  9.7  10.0  10.1   MAG  1.8  2.0  2.2   PHOS  4.8*  5.5*  4.9*       CBC -   Recent Labs   Lab Test  01/11/18   0708  01/10/18   0600  01/09/18   0536   WBC  1.6*  1.5*  2.3*   HGB  8.3*  8.2*  8.2*   PLT  52*  50*  46*       LFTs -   Recent Labs   Lab Test  01/09/18   0536  01/05/18   0651  01/02/18   1625   ALKPHOS  122  128  131   BILITOTAL  0.4  0.4  0.6   ALT  26  11  12   AST  40  19  30   PROTTOTAL  11.2*  11.1*  10.7*   ALBUMIN  2.0*  1.7*  1.6*       Iron Panel - No lab results found.    Current Medications:    pantoprazole  40 mg Oral QAM AC     diltiazem  60 mg Oral BID     sodium chloride (PF)  20 mL Intracatheter Q8H     heparin lock flush  5-10 mL Intracatheter Q24H     heparin  5 mL Intracatheter Q28 Days     citalopram (celeXA) tablet 40 mg  40 mg Oral Daily       dextrose 5% and 0.45% NaCl 125 mL/hr at 01/11/18 0457     - MEDICATION INSTRUCTIONS -       Humaira Crisostomo, NP  "

## 2018-01-11 NOTE — PLAN OF CARE
Afebrile, vs baseline. Mentation improving, pt faster to respond and less lethargic. Mag replaced today, recheck in AM. Pt denies pain. Nausea x1, compazine helpful. Appetite slightly improved. Spouse at bedside. Bed alarm on. Pt showered. Walked the halls x1. Continue with POC.

## 2018-01-12 ENCOUNTER — APPOINTMENT (OUTPATIENT)
Dept: PHYSICAL THERAPY | Facility: CLINIC | Age: 53
DRG: 871 | End: 2018-01-12
Attending: INTERNAL MEDICINE
Payer: COMMERCIAL

## 2018-01-12 LAB
ANION GAP SERPL CALCULATED.3IONS-SCNC: 8 MMOL/L (ref 3–14)
ANISOCYTOSIS BLD QL SMEAR: SLIGHT
BASOPHILS # BLD AUTO: 0 10E9/L (ref 0–0.2)
BASOPHILS NFR BLD AUTO: 0 %
BLD PROD TYP BPU: NORMAL
BLD UNIT ID BPU: 0
BLOOD BANK CMNT PATIENT-IMP: NORMAL
BLOOD PRODUCT CODE: NORMAL
BPU ID: NORMAL
BUN SERPL-MCNC: 21 MG/DL (ref 7–30)
CALCIUM SERPL-MCNC: 10 MG/DL (ref 8.5–10.1)
CHLORIDE SERPL-SCNC: 102 MMOL/L (ref 94–109)
CO2 SERPL-SCNC: 25 MMOL/L (ref 20–32)
CREAT SERPL-MCNC: 3 MG/DL (ref 0.66–1.25)
DIFFERENTIAL METHOD BLD: ABNORMAL
EOSINOPHIL # BLD AUTO: 0 10E9/L (ref 0–0.7)
EOSINOPHIL NFR BLD AUTO: 1 %
ERYTHROCYTE [DISTWIDTH] IN BLOOD BY AUTOMATED COUNT: 17.1 % (ref 10–15)
GFR SERPL CREATININE-BSD FRML MDRD: 22 ML/MIN/1.7M2
GLUCOSE SERPL-MCNC: 90 MG/DL (ref 70–99)
HCT VFR BLD AUTO: 26.9 % (ref 40–53)
HGB BLD-MCNC: 8.4 G/DL (ref 13.3–17.7)
LYMPHOCYTES # BLD AUTO: 0.1 10E9/L (ref 0.8–5.3)
LYMPHOCYTES NFR BLD AUTO: 5 %
MAGNESIUM SERPL-MCNC: 2.3 MG/DL (ref 1.6–2.3)
MCH RBC QN AUTO: 29.6 PG (ref 26.5–33)
MCHC RBC AUTO-ENTMCNC: 31.2 G/DL (ref 31.5–36.5)
MCV RBC AUTO: 95 FL (ref 78–100)
MONOCYTES # BLD AUTO: 0.1 10E9/L (ref 0–1.3)
MONOCYTES NFR BLD AUTO: 4 %
NEUTROPHILS # BLD AUTO: 1.4 10E9/L (ref 1.6–8.3)
NEUTROPHILS NFR BLD AUTO: 89 %
NEUTS BAND # BLD AUTO: 0 10E9/L (ref 0–0.6)
NEUTS BAND NFR BLD MANUAL: 1 %
PHOSPHATE SERPL-MCNC: 4.7 MG/DL (ref 2.5–4.5)
PLATELET # BLD AUTO: 53 10E9/L (ref 150–450)
POTASSIUM SERPL-SCNC: 3 MMOL/L (ref 3.4–5.3)
POTASSIUM SERPL-SCNC: 3.3 MMOL/L (ref 3.4–5.3)
RBC # BLD AUTO: 2.84 10E12/L (ref 4.4–5.9)
SODIUM SERPL-SCNC: 134 MMOL/L (ref 133–144)
TRANSFUSION STATUS PATIENT QL: NORMAL
TRANSFUSION STATUS PATIENT QL: NORMAL
URATE SERPL-MCNC: 7.7 MG/DL (ref 3.5–7.2)
WBC # BLD AUTO: 1.6 10E9/L (ref 4–11)

## 2018-01-12 PROCEDURE — 36592 COLLECT BLOOD FROM PICC: CPT | Performed by: NURSE PRACTITIONER

## 2018-01-12 PROCEDURE — 84132 ASSAY OF SERUM POTASSIUM: CPT | Performed by: INTERNAL MEDICINE

## 2018-01-12 PROCEDURE — 97110 THERAPEUTIC EXERCISES: CPT | Mod: GP

## 2018-01-12 PROCEDURE — 25000132 ZZH RX MED GY IP 250 OP 250 PS 637: Performed by: NURSE PRACTITIONER

## 2018-01-12 PROCEDURE — 83735 ASSAY OF MAGNESIUM: CPT | Performed by: NURSE PRACTITIONER

## 2018-01-12 PROCEDURE — 97116 GAIT TRAINING THERAPY: CPT | Mod: GP

## 2018-01-12 PROCEDURE — 25000132 ZZH RX MED GY IP 250 OP 250 PS 637: Performed by: PHYSICIAN ASSISTANT

## 2018-01-12 PROCEDURE — 84550 ASSAY OF BLOOD/URIC ACID: CPT | Performed by: NURSE PRACTITIONER

## 2018-01-12 PROCEDURE — 85025 COMPLETE CBC W/AUTO DIFF WBC: CPT | Performed by: NURSE PRACTITIONER

## 2018-01-12 PROCEDURE — 40000193 ZZH STATISTIC PT WARD VISIT

## 2018-01-12 PROCEDURE — 25800025 ZZH RX 258: Performed by: PHYSICIAN ASSISTANT

## 2018-01-12 PROCEDURE — 80048 BASIC METABOLIC PNL TOTAL CA: CPT | Performed by: NURSE PRACTITIONER

## 2018-01-12 PROCEDURE — 25000132 ZZH RX MED GY IP 250 OP 250 PS 637

## 2018-01-12 PROCEDURE — 84100 ASSAY OF PHOSPHORUS: CPT | Performed by: NURSE PRACTITIONER

## 2018-01-12 PROCEDURE — 12000008 ZZH R&B INTERMEDIATE UMMC

## 2018-01-12 RX ORDER — POTASSIUM CHLORIDE 750 MG/1
20 TABLET, EXTENDED RELEASE ORAL DAILY
Status: DISCONTINUED | OUTPATIENT
Start: 2018-01-12 | End: 2018-01-13

## 2018-01-12 RX ORDER — ALBUTEROL SULFATE 0.83 MG/ML
2.5 SOLUTION RESPIRATORY (INHALATION)
Status: DISCONTINUED | OUTPATIENT
Start: 2018-01-12 | End: 2018-01-15

## 2018-01-12 RX ORDER — EPINEPHRINE 1 MG/ML
0.3 INJECTION, SOLUTION, CONCENTRATE INTRAVENOUS EVERY 5 MIN PRN
Status: DISCONTINUED | OUTPATIENT
Start: 2018-01-12 | End: 2018-01-15

## 2018-01-12 RX ORDER — DIPHENHYDRAMINE HYDROCHLORIDE 50 MG/ML
50 INJECTION INTRAMUSCULAR; INTRAVENOUS
Status: DISCONTINUED | OUTPATIENT
Start: 2018-01-12 | End: 2018-01-15

## 2018-01-12 RX ORDER — METHYLPREDNISOLONE SODIUM SUCCINATE 125 MG/2ML
125 INJECTION, POWDER, LYOPHILIZED, FOR SOLUTION INTRAMUSCULAR; INTRAVENOUS
Status: DISCONTINUED | OUTPATIENT
Start: 2018-01-12 | End: 2018-01-15

## 2018-01-12 RX ORDER — MEPERIDINE HYDROCHLORIDE 50 MG/ML
25 INJECTION INTRAMUSCULAR; INTRAVENOUS; SUBCUTANEOUS EVERY 30 MIN PRN
Status: DISCONTINUED | OUTPATIENT
Start: 2018-01-12 | End: 2018-01-15

## 2018-01-12 RX ORDER — EPINEPHRINE 0.3 MG/.3ML
0.3 INJECTION SUBCUTANEOUS EVERY 5 MIN PRN
Status: DISCONTINUED | OUTPATIENT
Start: 2018-01-12 | End: 2018-01-15

## 2018-01-12 RX ORDER — ALBUTEROL SULFATE 90 UG/1
1-2 AEROSOL, METERED RESPIRATORY (INHALATION)
Status: DISCONTINUED | OUTPATIENT
Start: 2018-01-12 | End: 2018-01-15

## 2018-01-12 RX ORDER — ACETAMINOPHEN 325 MG/1
650 TABLET ORAL ONCE
Status: COMPLETED | OUTPATIENT
Start: 2018-01-13 | End: 2018-01-13

## 2018-01-12 RX ORDER — DIPHENHYDRAMINE HCL 50 MG
50 CAPSULE ORAL ONCE
Status: COMPLETED | OUTPATIENT
Start: 2018-01-13 | End: 2018-01-13

## 2018-01-12 RX ORDER — LORAZEPAM 2 MG/ML
0.5 INJECTION INTRAMUSCULAR EVERY 4 HOURS PRN
Status: DISCONTINUED | OUTPATIENT
Start: 2018-01-12 | End: 2018-01-17 | Stop reason: HOSPADM

## 2018-01-12 RX ORDER — DEXAMETHASONE SODIUM PHOSPHATE 4 MG/ML
20 INJECTION, SOLUTION INTRA-ARTICULAR; INTRALESIONAL; INTRAMUSCULAR; INTRAVENOUS; SOFT TISSUE ONCE
Status: COMPLETED | OUTPATIENT
Start: 2018-01-13 | End: 2018-01-13

## 2018-01-12 RX ORDER — SODIUM CHLORIDE 9 MG/ML
1000 INJECTION, SOLUTION INTRAVENOUS CONTINUOUS PRN
Status: DISCONTINUED | OUTPATIENT
Start: 2018-01-12 | End: 2018-01-15

## 2018-01-12 RX ADMIN — OXYCODONE HYDROCHLORIDE 5 MG: 5 TABLET ORAL at 16:54

## 2018-01-12 RX ADMIN — DILTIAZEM HYDROCHLORIDE 60 MG: 60 TABLET, FILM COATED ORAL at 21:02

## 2018-01-12 RX ADMIN — POTASSIUM CHLORIDE 20 MEQ: 750 TABLET, EXTENDED RELEASE ORAL at 08:18

## 2018-01-12 RX ADMIN — DEXTROSE AND SODIUM CHLORIDE: 5; 450 INJECTION, SOLUTION INTRAVENOUS at 22:49

## 2018-01-12 RX ADMIN — DILTIAZEM HYDROCHLORIDE 60 MG: 60 TABLET, FILM COATED ORAL at 08:16

## 2018-01-12 RX ADMIN — PROCHLORPERAZINE MALEATE 10 MG: 5 TABLET, FILM COATED ORAL at 03:07

## 2018-01-12 RX ADMIN — DEXTROSE AND SODIUM CHLORIDE: 5; 450 INJECTION, SOLUTION INTRAVENOUS at 08:23

## 2018-01-12 RX ADMIN — PANTOPRAZOLE SODIUM 40 MG: 40 TABLET, DELAYED RELEASE ORAL at 08:16

## 2018-01-12 RX ADMIN — CITALOPRAM HYDROBROMIDE 40 MG: 20 TABLET ORAL at 08:16

## 2018-01-12 RX ADMIN — PROCHLORPERAZINE MALEATE 10 MG: 5 TABLET, FILM COATED ORAL at 08:23

## 2018-01-12 RX ADMIN — ACETAMINOPHEN 650 MG: 325 TABLET ORAL at 16:29

## 2018-01-12 NOTE — PROGRESS NOTES
Care Coordinator Progress Note     Admission Date/Time:  1/2/2018  Attending MD:  Flaca Guillen MD  Hematologist: Dr. Torre/[facility]     Data  Chart reviewed, discussed with interdisciplinary team.   Patient was admitted for: Acute Kidney Injury  Patient has a history of Multiple Myeloma    Concerns with insurance coverage for discharge needs: None.  Current Living Situation: Patient lives with spouse.  Support System: Supportive and Involved  Services Involved: TBD  Transportation: Family or Friend will provide  Barriers to Discharge: Medical Stability    1/4  Coordination of Care and Referrals: Current recommendation is TCU; SW aware. Writer will continue to follow and assist as needed.    1/12 Per HERMILO Yousif, patient may be stable to discharge Sunday. Therapies recommend TCU vs home with 24 hour assist. SW met with patient; please see her note. Patient's preference is to discharge home but is agreeable to TCU if wife feels that would be best. Writer spoke to patient's wife, Shasta. Per Shasta, if therapies continue to feel he would be safe discharging home with assist, she is in agreement with his discharging home. Per Shasta, she and their daughter will be able to provide 24 hour assist. When questioned if home care would be beneficial, Shasta thought a resumption of his outpatient therapies would be more helpful; AVS updated to resume OP PT/OT. The patient has had home care previously and it was not helpful, per Shasta. Shasta will call the clinic to resume services upon discharge. Shasta would like the referrals made to TCUs in case therapy recommendations change.      Assessment  RNCC participated in bedside rounds. Introduced the role of RNCC to patient and present family members.     Plan  Anticipated Discharge Date:  1/6/2018  Anticipated Discharge Plan:  TCU with clinic follow up    ROHITH Gee  Phone [phone]  Pager [phone]

## 2018-01-12 NOTE — PROGRESS NOTES
[facility], [place]  Hematology / Oncology Progress Note     Assessment & Plan   Jeffrey Real is a 53 y/o M with complex PMH including refractory IgA Multiple Myeloma s/p autologous transplant 08/2014, most recently on Daralex/Pom/Decadron (started 11/17), transferred from OSH d/t CHRISTIANE on CKD, persistent sinusitis, HCAP, concern for sepsis, & altered mental status.      # Delirium vs encephalopathy. On admit very lethargic/sleepy on exam. Hyperreflexic. Clonus. Likely multifactorial (kidney injury, long acting pain medications, hypercalcemia, infectious-sinusitis/PNA). Mental status waxing/waning over past few days.  - Head CT w/o contrast shows no evidence of acute intracranial pathology but + sinusitis & mild chronic small vessel ischemic disease.  - Held PTA oxycodone, dilaudid, ativan & gabapentin d/t potential sedating effects/CHRISTIANE, no withdrawal symptoms. Given reports of pain, added back oxycodone 5-10mg every 4h as needed.  - Continued improvement today. OK to d/c video patient monitoring.  - D/c'd PRN Ativan. QTc 456, using Haldol cautiously (0.5-1mg every 6 hours PRN).   - SLUMs score 20/30 per OT (abnormal) - consider repeating in coming days given improvement in past 48h.  - Neurology consulted, appreciate their input and recommendations. Video EEG obtained to r/o subclinical seizures. Difficult study given agitation, but indicates mild-to-moderate encephalopathy with no evidence of seizures seen.  - Hyperammonemic on admission, with normal liver function, so not routinely trended thereafter (70-->92-->95). Level re-checked on 1/11, slightly elevated at 65.  - Surveillance blood cultures drawn, urine culture recollected (negative when done on 1/2).   - May need LP to r/o meningeal involvement of myeloma. Unable to obtain brain MRI secondary to CHRISTIANE.  - Continue to hold acyclovir in the event it is exacerbating/contributing to AMS given impaired renal function.  - Continue  aggressive IV hydration per nephrology recs and continue to follow SCr trend.      # High Risk/Resistant Multiple Myeloma IgA kappa. S/P autologous peripheral blood stem cell transplant in 08/2014.   # Multiple lytic lesions 2/2 MM (ribs, T7, T9, T11, T12) s/p XRT to thoracolumbar spine.  # Fracture L ischium.  # Hypogammaglobulinemia s/p IVIG replacement.  S/P multiple lines of therapy including auto tx (RVD, auto+melphalan, Velcade Maintenance, VDT-PACE, followed by PCD) complex cytogenetics (17 p deletion, p53 deletion) most recently on pomalidomide/carfilzomib & dex 20+ cycles (started 02/2017) with progression in November 2017. Most recently switched to daratumumab/pomalidomide/dex.  - Plan was to try to get M-spike < 1 gram% then pursue Allogeneic transplant (saw Dr. Torre in clinic 11/27/17). Has brother as haplo-identical donor.  - 12/26/17 found to have pathologic fractures of T2 spinous process & bilateral T1 transverse process. Destructive lytic lesion involving left scapula (partially imaged).   - S/p dex and daratumumab on 1/6/18, holding Pomalyst. Per wife, this was his 3rd dose of daratumumab since initiating in November 2017. Myeloma labs from this admission compared to prior labs from Nov 2017 at OSH, ? Slightly worse.  - Plan dex and daratumumab per protocol tomorrow 1/13.      # Pancytopenia 2/2 to MM & related therapy. Recommended dose decrease with subsequent cycles.  - Keep HgB > 8, PLT > 10.       # Acute Kidney Injury/Failure 2/2 to ATN. Cr 4.5 at OSH (up from baseline 0.8-1.1). Likely r/t contrast (got facial bone contrast CT on 12/27 at OSH, + underlying MM) vs. worsening MM vs. Sepsis/infection. No evidence of hypotension in OSH records. Unable to see if he was anuric at OSH.  # Hyperkalemia --> RESOLVED  # Hyperphosphatemia --> improving  # Hyperuricemia --> improving  # Chronic kidney disease 2/2 to MM.  # Hx. CHRISTIANE requiring HD from sepsis.  - Avoid nephrotoxins, contrast, renally  dose medications.  - Nephrology consulted, appreciate their input and assistance. Continue hydration with D5+ 1/2NS @ 125ml/hr. No Lasix unless signs/symptoms of fluid overload.  - BMP daily. Cr improved to 3.00 today.      # Hypercalcemia. Last dose of Zometa was 12/7/17, on monthly schedule.  - Due for Zometa but holding given CHRISTIANE. Ionized calcium normalized, 5.0 on 1/9. Continue to follow. No plans to give Zometa or pamidronate currently (may consider giving pamidronate prior to discharge).    #Hypoglycemia.  -Noted intermittently. Reviewed records from OSH through [facility], appears to have occurred PTA as well. ? Due to CHRISTIANE. Continue to monitor, hypoglycemia protocol in place. Added D5 back to IV fluids with noted improvement. Consider endocrinology consult.    #Prolonged QTc  -Level on admission at 471. Repeat EKG 1/9 is 455. D/c'd Zofran, using Haldol with caution as above. Past EKGs show interval normal 8/2017, first prolonged on 12/25.      # HCAP & Sinusitis.   # Fever without neutropenia.  # RSV  # Hypogammaglobulinemia.  Streaky R lung base opacity. S/P multiple hospitalizations. Most recently 12/11-12/21, 12/25-12/30, & now 1/1- current (transfer from OSH). CT sinus + for sinusitis. Head CT 1/2/18 showing persistent sinusitis.  - Urine & blood CX NGTD from OSH. Unable to collect sputum culture.   - On Zosyn 1/2-1/4. Abx stopped given positive RSV, and negative cultures.  - Repeat blood culture & UA/UC ordered (NGTD, continue to monitor).  - ID consulted, appreciate their input. No ribavirin given for RSV d/t CHRISTIANE. Infectious workup otherwise negative.  - Given sucrose-free IVIG on 1/8.     # ID PPX. S/p Pentamidine 1/5/17. Acyclovir on hold per above (per guidelines, dose should be reduced from 400mg BID to 200mg BID if CrCl <10, but holding at this time to see if delirium improves).      # GERD, hx. Candida esophagitis.  - Protonix 40mg daily.      # Hx. Vtach s/p ICD placement.  # Hx. HTN.  -  Diltiazem 60 mg BID initially held due to need for close monitoring of BPs for possible sepsis. Restarted 1/5/17, BPs stable.   - Remote history of Metoprolol use but not seen in last 2 hospitalization/clinic notes from December.    # Chronic pain 2/2 to MM. Multiple fractures/lytic lesions.  Held PTA meds d/t encephalopathy (OxyContin, Dilaudid).   - Concern for pain to legs/back as above. Started PRN oxycodone per above.      # Peripheral neuropathy 2/2 to MM therapy. Holding PTA gabapentin.  - Continue to hold at this time.    # Major depression. Continue PTA Celexa.    # Weakness/deconditioning 2/2 to multiple hospitalizations, MM/therapy, & chronic disease.  # Chronic fatigue.  - PT/OT consult.   - TCU vs home with 24h assist. SW aware, working on referrals.     # Diarrhea  -C.diff negative.    # Mild-Moderate Malnutrition 2/2 to chronic disease/infections, multiple hospitalizations.  - Regular diet, encourage snacks/supplements.  - Juan Jose counts initiated (1/5).     FEN:   - MIVF with D5 + 1/2NS at 125ml/hr per nephrology team.  - Follow lytes closely, replace PRN  - Regular diet as tolerated - consider repeating calorie counts with improvement in mental status.     PPX (DVT/GI): protonix daily, ambulate   LINES/DRAINS: Port.  CONSULTS: Nephrology, ENT, OT, PT, ID.   CODE: Full.  DISPO: Discharge in next 2-3 days to TCU after next treatment with daratumumab and dexamethasone.    Patient and plan of care discussed with staff attending, Dr. Guevara.    IVETTE YousifC  Hematology/Oncology  [phone]    Interval History   David seen with wife at bedside. Doing a lot better today. Alert, answering questions appropriately, not impulsive, no further hallucinations. Still with poor PO intake. Denies pain today. No other bothersome symptoms.    Physical Exam   Temp: 99.8  F (37.7  C) Temp src: Oral BP: (!) 150/92 Pulse: 93 Heart Rate: 96 Resp: 16 SpO2: 98 % O2 Device: None (Room air)    Vitals:    01/09/18 0750  01/11/18 1159 01/12/18 0900   Weight: 75.7 kg (166 lb 14.4 oz) 77.2 kg (170 lb 3.2 oz) 77.1 kg (170 lb)     Vital Signs with Ranges  Temp:  [96.1  F (35.6  C)-99.8  F (37.7  C)] 99.8  F (37.7  C)  Pulse:  [93] 93  Heart Rate:  [] 96  Resp:  [16-18] 16  BP: (130-155)/(77-95) 150/92  SpO2:  [95 %-98 %] 98 %  I/O last 3 completed shifts:  In: 2170 [P.O.:150; I.V.:2020]  Out: 2500 [Urine:2500]    Constitutional: Seen lying in bed, alert, cooperative, interactive.  Respiratory: No increased work of breathing, good air exchange.  Cardiovascular: Regular rate and rhythm. No murmur.  Abdominal: + bowel sounds, soft, non-tender, non-distended.  Musculoskeletal: No LE edema bilaterally.  Neuropsychiatric: Alert, awake, oriented x 3. No focal neurologic deficits, cranial nerves intact as tested.    Medications     - MEDICATION INSTRUCTIONS -       NaCl       dextrose 5% and 0.45% NaCl 125 mL/hr at 01/12/18 0823     - MEDICATION INSTRUCTIONS -         [START ON 1/13/2018] diphenhydrAMINE  50 mg Oral Once     [START ON 1/13/2018] acetaminophen  650 mg Oral Once     [START ON 1/13/2018] daratumumab (DARZALEX) infusion  16 mg/kg (Treatment Plan Recorded) Intravenous Once     potassium chloride  20 mEq Oral Daily     [START ON 1/13/2018] dexamethasone  20 mg Intravenous Once     pantoprazole  40 mg Oral QAM AC     diltiazem  60 mg Oral BID     sodium chloride (PF)  20 mL Intracatheter Q8H     heparin lock flush  5-10 mL Intracatheter Q24H     heparin  5 mL Intracatheter Q28 Days     citalopram (celeXA) tablet 40 mg  40 mg Oral Daily       Data   Results for orders placed or performed during the hospital encounter of 01/02/18 (from the past 24 hour(s))   CBC with platelets differential   Result Value Ref Range    WBC 1.6 (L) 4.0 - 11.0 10e9/L    RBC Count 2.84 (L) 4.4 - 5.9 10e12/L    Hemoglobin 8.4 (L) 13.3 - 17.7 g/dL    Hematocrit 26.9 (L) 40.0 - 53.0 %    MCV 95 78 - 100 fl    MCH 29.6 26.5 - 33.0 pg    MCHC 31.2 (L) 31.5  - 36.5 g/dL    RDW 17.1 (H) 10.0 - 15.0 %    Platelet Count 53 (L) 150 - 450 10e9/L    Diff Method Manual Differential     % Neutrophils 89.0 %    % Lymphocytes 5.0 %    % Monocytes 4.0 %    % Eosinophils 1.0 %    % Basophils 0.0 %    % Band 1.0 %    Absolute Neutrophil 1.4 (L) 1.6 - 8.3 10e9/L    Absolute Lymphocytes 0.1 (L) 0.8 - 5.3 10e9/L    Absolute Monocytes 0.1 0.0 - 1.3 10e9/L    Absolute Eosinophils 0.0 0.0 - 0.7 10e9/L    Absolute Basophils 0.0 0.0 - 0.2 10e9/L    Absolute Bands 0.0 0.0 - 0.6 10e9/L    Anisocytosis Slight    Basic metabolic panel   Result Value Ref Range    Sodium 134 133 - 144 mmol/L    Potassium 3.0 (L) 3.4 - 5.3 mmol/L    Chloride 102 94 - 109 mmol/L    Carbon Dioxide 25 20 - 32 mmol/L    Anion Gap 8 3 - 14 mmol/L    Glucose 90 70 - 99 mg/dL    Urea Nitrogen 21 7 - 30 mg/dL    Creatinine 3.00 (H) 0.66 - 1.25 mg/dL    GFR Estimate 22 (L) >60 mL/min/1.7m2    GFR Estimate If Black 27 (L) >60 mL/min/1.7m2    Calcium 10.0 8.5 - 10.1 mg/dL   Uric acid   Result Value Ref Range    Uric Acid 7.7 (H) 3.5 - 7.2 mg/dL   Magnesium   Result Value Ref Range    Magnesium 2.3 1.6 - 2.3 mg/dL   Phosphorus   Result Value Ref Range    Phosphorus 4.7 (H) 2.5 - 4.5 mg/dL   Potassium   Result Value Ref Range    Potassium 3.3 (L) 3.4 - 5.3 mmol/L       Staff Addendum: Patient was seen and examined by me. All labs, VS and pertinent images were reviewed with the team on rounds. Plans for care were mutually developed and outlined above with my direct input.    Interval Events/ROS: Remains even more alert and oriented today. Answering orientation questions more easily. Still little to no appetite but is starting to try more.   Rest as above.     Exam: Knew name, date/location today. NAD, cooperative with exam. OP dry. Lungs were clear, HRRR, Abd soft/NT/ND, no HSM or masses, No LE edema, Nro grossly non-focal, Skin exam was without rash.    A/P: 51yo M with IgA MM, relapsed after auto in 2014, here with new onset  CHRISTIANE and waxing/waning MS.  - Monitor given that he is better today. Will plan on Daratumumab tomorrow and if he remains stable after steroids, most likely culprit will be narrowed (based on our limited scope of action this week) to ACV. Will need to work out other Zoster prophy if that is felt to be the case.    - Will need to monitor PO intake but I expect that to pick once renal failure is better.  - Rest as above.     Tony Guevara, DO

## 2018-01-12 NOTE — PLAN OF CARE
Problem: Patient Care Overview  Goal: Plan of Care/Patient Progress Review  Outcome: Improving  BP max 155/95 after activity, other VSS on RA. A&Ox4, no hallucinations noted this shift. Calm and pleasant, no agitation this shift. Complaints of discomfort in chest related to ICD, declined intervention; denied back or leg pain. Denies nausea, appetite remains poor. Ate 1/2 bowl cereal, encouraged fluid intake. Port infusing D5 1/2 NS at 125 cc/hr. Voiding adequately, no BM this shift. Ambulated in halls with SBA + walker. Sleeping between cares. Pt's wife, Shasta, at bedside, BA remains on for safety. VPM notified that we are not monitoring unless wife is not at bedside. Continue with POC.

## 2018-01-12 NOTE — PLAN OF CARE
Problem: Patient Care Overview  Goal: Plan of Care/Patient Progress Review  Outcome: No Change    AVSS, afebrile. C/o nausea, compazine given x 1. Denies pain. Pt disoriented to situation, slightly confused at times. No hallucinations noted overnight. Voiding well. Wife at bedside, bed alarm on for safety. VPM to be used if wife not at bedside. Cont POC.

## 2018-01-12 NOTE — PROGRESS NOTES
Social Work: Assessment with Discharge Plan    Patient Name:  Jeffrey Real  :  1965  Age:  52 year old  MRN:  [id number]  Risk/Complexity Score:  Filed Complexity Screen Score: 8  Completed assessment with:  Pt, chart review    Presenting Information   Reason for Referral:  Discharge plan, TCU placement  Date of Intake:  2018  Referral Source:  Chart Review  Decision Maker:  Pt at baseline  Alternate Decision Maker:  Per [facility] policy  Health Care Directive: Did not discuss  Living Situation:  House- one level, with wife  Previous Functional Status:  Assistance with Other:  Pt reported being mainly independent previously but then reported thta he got help with some stuff but couldn't remember what. Per PT eval pt used AD for walking, used AD and got help from wife with bathing, got help from another person for dressing.  Patient and family understanding of hospitalization:  Did not assess  Cultural/Language/Spiritual Considerations:  Pt is english speaking male  Adjustment to Illness:  Pt is adjusting appropriately    Physical Health  Reason for Admission:    1. Multiple myeloma in relapse (H)      Services Needed/Recommended:  TCU    Mental Health/Chemical Dependency  Diagnosis:  None per H&P  Support/Services in Place:  NA  Services Needed/Recommended:  NA    Support System  Significant relationship at present time:  Pt's wife Shasta whom pt reports is home   Family of origin is available for support:  Yes  Other support available:  Unknown  Gaps in support system:  Pt currently needs TCU  Patient is caregiver to:  Other:  Unknown     Provider Information   Primary Care Physician:  Hipolito Rollins   [phone]   Clinic:  [address] [address]      :  Unknown    Financial   Income Source:  Pt is retired  Financial Concerns:  Pt is concerned about whether or not his insurance would cover a TCU stay  Insurance:    Payor/Plan Subscriber Name Rel Member #  Group #   MEDICA - MEDICA CHOICE KISHA BHATIA  [id number]      [address] [address]       Discharge Plan   Patient and family discharge goal:  Pt reported he wants to go home and is not very agreeable to TCU but is agreeable to TCU referrals being made and there being a conversation with his wife as to whether or not she would be ok with pt going home as she would be his caregiver.   Provided education on discharge plan:  YES- pt recalled TCU being discussed in the past but then he ended up going home with home therapies.  Patient agreeable to discharge plan:  Pt is agreeable to a dual d/c plan being in the works.  A list of Medicare Certified Facilities was provided to the patient and/or family to encourage patient choice. Patient's choices for facility are:  Yes- pt is agreeable to referrals being made to:   - [facility] (p. [phone], f. [phone])- referral faxed via [employer], waiting to hear back. Lilia in admissions called back reporting referral was received but there are confirmed cases of the flu so facility cannot accept pt plus she would have to look into whether or not they could accommodate pt's chemo needs.   - [facility] (p. [phone], f. [phone])- referral faxed via [employer], waiting to hear back.   - [facility] [place] (p. [phone], f. [phone])- referral faxed via [employer], waiting to hear back.   Will NH provide Skilled rehabilitation or complex medical:  YES  General information regarding anticipated insurance coverage and possible out of pocket cost was discussed. Patient and patient's family are aware patient may incur the cost of transportation to the facility, pending insurance payment: YES, aside from transportation  Barriers to discharge:  Medical readiness, bed availability/acceptance, determination of d/c plan    Discharge Recommendations   Anticipated Disposition:  TBD, TCU vs home  Transportation Needs:  Other:  Unknown  Name of Transportation  Company and Phone:  Pt would not have insurance coverage for medical transportation.    Additional comments   Pt was generally appropriately oriented during conversation and expressed understanding of TCU recommendations and cares.   Per treatment team pt is on weekly infusion chemo (darzalex) and weekly oral chemo (dexamethasone) and pt's pomalyst is on hold due to pt's current counts and renal function. Sw explained to pt that the cost of his chemo will likely be cost prohibitive for community TCUs and pt is agreeable to a referral being made to  TCU if community TCUs decline.   Per Treatment team pt could possibly be ready to d/c Sunday and are aware that facility will view VPM (which pt currently has in his room) the same as a sitter and they will see if this can be d/c'd today.     Melia Ortega, PAUL, MSW  7B   [phone] (pager) [phone]  1/12/2018

## 2018-01-12 NOTE — PLAN OF CARE
Problem: Patient Care Overview  Goal: Plan of Care/Patient Progress Review  Discharge Planner PT   Patient plan for discharge: rehab  Current status: Pt demonstrates impaired functional endurance with amb although improving overall. Pt amb 200 ft+400 ft +200 ft with FWW and SBA, pt took 2 seated rest breaks d/t fatigue. Pt demonstrates improved safety awareness with amb and able to navigate walker around obstacles.  Barriers to return to prior living situation: decreased functional strength, endurance, very deconditioned from many hospital stays  Recommendations for discharge: TCU  Rationale for recommendations: To improve overall strength, IND and safety with functional mobility.         Entered by: Esthela Ross 01/12/2018 3:49 PM

## 2018-01-13 LAB
ANION GAP SERPL CALCULATED.3IONS-SCNC: 11 MMOL/L (ref 3–14)
ANISOCYTOSIS BLD QL SMEAR: SLIGHT
BASOPHILS # BLD AUTO: 0 10E9/L (ref 0–0.2)
BASOPHILS NFR BLD AUTO: 0 %
BUN SERPL-MCNC: 19 MG/DL (ref 7–30)
CALCIUM SERPL-MCNC: 10.2 MG/DL (ref 8.5–10.1)
CHLORIDE SERPL-SCNC: 104 MMOL/L (ref 94–109)
CO2 SERPL-SCNC: 22 MMOL/L (ref 20–32)
CREAT SERPL-MCNC: 2.73 MG/DL (ref 0.66–1.25)
DIFFERENTIAL METHOD BLD: ABNORMAL
EOSINOPHIL # BLD AUTO: 0 10E9/L (ref 0–0.7)
EOSINOPHIL NFR BLD AUTO: 2 %
ERYTHROCYTE [DISTWIDTH] IN BLOOD BY AUTOMATED COUNT: 17 % (ref 10–15)
GFR SERPL CREATININE-BSD FRML MDRD: 25 ML/MIN/1.7M2
GLUCOSE SERPL-MCNC: 94 MG/DL (ref 70–99)
HCT VFR BLD AUTO: 28.1 % (ref 40–53)
HGB BLD-MCNC: 8.9 G/DL (ref 13.3–17.7)
LYMPHOCYTES # BLD AUTO: 0.1 10E9/L (ref 0.8–5.3)
LYMPHOCYTES NFR BLD AUTO: 5 %
MAGNESIUM SERPL-MCNC: 1.9 MG/DL (ref 1.6–2.3)
MCH RBC QN AUTO: 29.7 PG (ref 26.5–33)
MCHC RBC AUTO-ENTMCNC: 31.7 G/DL (ref 31.5–36.5)
MCV RBC AUTO: 94 FL (ref 78–100)
MONOCYTES # BLD AUTO: 0.2 10E9/L (ref 0–1.3)
MONOCYTES NFR BLD AUTO: 11 %
MYELOCYTES # BLD: 0.1 10E9/L
MYELOCYTES NFR BLD MANUAL: 7 %
NEUTROPHILS # BLD AUTO: 1.5 10E9/L (ref 1.6–8.3)
NEUTROPHILS NFR BLD AUTO: 73 %
PHOSPHATE SERPL-MCNC: 4.8 MG/DL (ref 2.5–4.5)
PLATELET # BLD AUTO: 50 10E9/L (ref 150–450)
POTASSIUM SERPL-SCNC: 3.2 MMOL/L (ref 3.4–5.3)
PROMYELOCYTES # BLD MANUAL: 0 10E9/L
PROMYELOCYTES NFR BLD MANUAL: 2 %
RBC # BLD AUTO: 3 10E12/L (ref 4.4–5.9)
ROULEAUX BLD QL SMEAR: ABNORMAL
SODIUM SERPL-SCNC: 136 MMOL/L (ref 133–144)
URATE SERPL-MCNC: 7 MG/DL (ref 3.5–7.2)
WBC # BLD AUTO: 2 10E9/L (ref 4–11)

## 2018-01-13 PROCEDURE — 85025 COMPLETE CBC W/AUTO DIFF WBC: CPT | Performed by: NURSE PRACTITIONER

## 2018-01-13 PROCEDURE — 25000132 ZZH RX MED GY IP 250 OP 250 PS 637: Performed by: NURSE PRACTITIONER

## 2018-01-13 PROCEDURE — 25000128 H RX IP 250 OP 636: Performed by: INTERNAL MEDICINE

## 2018-01-13 PROCEDURE — 12000008 ZZH R&B INTERMEDIATE UMMC

## 2018-01-13 PROCEDURE — 25000125 ZZHC RX 250: Performed by: NURSE PRACTITIONER

## 2018-01-13 PROCEDURE — 25800025 ZZH RX 258: Performed by: PHYSICIAN ASSISTANT

## 2018-01-13 PROCEDURE — 36592 COLLECT BLOOD FROM PICC: CPT | Performed by: NURSE PRACTITIONER

## 2018-01-13 PROCEDURE — 83735 ASSAY OF MAGNESIUM: CPT | Performed by: NURSE PRACTITIONER

## 2018-01-13 PROCEDURE — 84100 ASSAY OF PHOSPHORUS: CPT | Performed by: NURSE PRACTITIONER

## 2018-01-13 PROCEDURE — 84550 ASSAY OF BLOOD/URIC ACID: CPT | Performed by: NURSE PRACTITIONER

## 2018-01-13 PROCEDURE — 25000132 ZZH RX MED GY IP 250 OP 250 PS 637: Performed by: INTERNAL MEDICINE

## 2018-01-13 PROCEDURE — 25000128 H RX IP 250 OP 636: Performed by: NURSE PRACTITIONER

## 2018-01-13 PROCEDURE — 80048 BASIC METABOLIC PNL TOTAL CA: CPT | Performed by: NURSE PRACTITIONER

## 2018-01-13 PROCEDURE — 25000125 ZZHC RX 250: Performed by: INTERNAL MEDICINE

## 2018-01-13 PROCEDURE — 25000132 ZZH RX MED GY IP 250 OP 250 PS 637

## 2018-01-13 PROCEDURE — 25000132 ZZH RX MED GY IP 250 OP 250 PS 637: Performed by: PHYSICIAN ASSISTANT

## 2018-01-13 RX ORDER — MINERAL OIL/HYDROPHIL PETROLAT
OINTMENT (GRAM) TOPICAL
Status: DISCONTINUED | OUTPATIENT
Start: 2018-01-13 | End: 2018-01-17 | Stop reason: HOSPADM

## 2018-01-13 RX ORDER — LANOLIN ALCOHOL/MO/W.PET/CERES
3 CREAM (GRAM) TOPICAL
Status: DISCONTINUED | OUTPATIENT
Start: 2018-01-13 | End: 2018-01-17 | Stop reason: HOSPADM

## 2018-01-13 RX ORDER — AMLODIPINE BESYLATE 2.5 MG/1
2.5 TABLET ORAL DAILY
Status: DISCONTINUED | OUTPATIENT
Start: 2018-01-13 | End: 2018-01-13

## 2018-01-13 RX ORDER — HYDRALAZINE HYDROCHLORIDE 10 MG/1
10 TABLET, FILM COATED ORAL EVERY 6 HOURS PRN
Status: DISCONTINUED | OUTPATIENT
Start: 2018-01-13 | End: 2018-01-17 | Stop reason: HOSPADM

## 2018-01-13 RX ORDER — OXYMETAZOLINE HYDROCHLORIDE 0.05 G/100ML
2 SPRAY NASAL 2 TIMES DAILY
Status: DISCONTINUED | OUTPATIENT
Start: 2018-01-13 | End: 2018-01-15

## 2018-01-13 RX ORDER — SODIUM CHLORIDE/ALOE VERA
GEL (GRAM) NASAL 4 TIMES DAILY PRN
Status: DISCONTINUED | OUTPATIENT
Start: 2018-01-13 | End: 2018-01-17 | Stop reason: HOSPADM

## 2018-01-13 RX ADMIN — DEXAMETHASONE SODIUM PHOSPHATE 20 MG: 4 INJECTION, SOLUTION INTRAMUSCULAR; INTRAVENOUS at 10:20

## 2018-01-13 RX ADMIN — OXYMETAZOLINE HYDROCHLORIDE 2 SPRAY: 5 SPRAY NASAL at 16:45

## 2018-01-13 RX ADMIN — PANTOPRAZOLE SODIUM 40 MG: 40 TABLET, DELAYED RELEASE ORAL at 08:23

## 2018-01-13 RX ADMIN — Medication 2 G: at 09:48

## 2018-01-13 RX ADMIN — PROCHLORPERAZINE EDISYLATE 5 MG: 5 INJECTION INTRAMUSCULAR; INTRAVENOUS at 08:31

## 2018-01-13 RX ADMIN — OXYMETAZOLINE HYDROCHLORIDE 2 SPRAY: 5 SPRAY NASAL at 20:23

## 2018-01-13 RX ADMIN — METOPROLOL TARTRATE 12.5 MG: 25 TABLET, FILM COATED ORAL at 20:24

## 2018-01-13 RX ADMIN — DILTIAZEM HYDROCHLORIDE 60 MG: 60 TABLET, FILM COATED ORAL at 20:24

## 2018-01-13 RX ADMIN — SODIUM CHLORIDE, PRESERVATIVE FREE 5 ML: 5 INJECTION INTRAVENOUS at 08:24

## 2018-01-13 RX ADMIN — ACETAMINOPHEN 650 MG: 325 TABLET ORAL at 10:17

## 2018-01-13 RX ADMIN — DILTIAZEM HYDROCHLORIDE 60 MG: 60 TABLET, FILM COATED ORAL at 08:23

## 2018-01-13 RX ADMIN — CITALOPRAM HYDROBROMIDE 40 MG: 20 TABLET ORAL at 08:23

## 2018-01-13 RX ADMIN — DARATUMUMAB 1250 MG: 100 INJECTION, SOLUTION, CONCENTRATE INTRAVENOUS at 11:29

## 2018-01-13 RX ADMIN — DIPHENHYDRAMINE HCL 50 MG: 50 CAPSULE ORAL at 10:17

## 2018-01-13 RX ADMIN — OXYCODONE HYDROCHLORIDE 10 MG: 5 TABLET ORAL at 20:24

## 2018-01-13 RX ADMIN — WHITE PETROLATUM: 1.75 OINTMENT TOPICAL at 12:12

## 2018-01-13 RX ADMIN — DEXTROSE AND SODIUM CHLORIDE: 5; 450 INJECTION, SOLUTION INTRAVENOUS at 22:24

## 2018-01-13 RX ADMIN — METOPROLOL TARTRATE 12.5 MG: 25 TABLET, FILM COATED ORAL at 12:11

## 2018-01-13 RX ADMIN — DEXTROSE AND SODIUM CHLORIDE: 5; 450 INJECTION, SOLUTION INTRAVENOUS at 06:18

## 2018-01-13 RX ADMIN — POTASSIUM CHLORIDE 20 MEQ: 750 TABLET, EXTENDED RELEASE ORAL at 08:23

## 2018-01-13 NOTE — PLAN OF CARE
Problem: Patient Care Overview  Goal: Plan of Care/Patient Progress Review  Outcome: No Change    AVSS, afebrile. Denies pain, nausea. Pt oriented x 4, forgetful. Bed alarm on for safety, pt has been calling appropriately for help to bathroom. Pt to get chemo this morning. Pt having a slow nose bleed this morning, tried to get pt to put pressure to stop, pt not following directions. Nose bleed slow, intermittently stops and restarts. Cont POC.

## 2018-01-13 NOTE — PLAN OF CARE
Problem: Patient Care Overview  Goal: Plan of Care/Patient Progress Review  Outcome: No Change  D/I: Up in room with SBA of 1. Gait steady. Alert and oriented x 3, but forgetful. Bed alarm on at all times.  Using the call light to ask for assistance. Ate cereal for supper. Stated that he had no appetite. Afebrile. B/p slightly elevated. Given scheduled medication at HS. C/o mid sternal pain at the defibrillator site. Stated that the pain increases with movement. Given Tylenol and Oxycodone x 1. Stated that the pain decreased after the pain medication. Lungs diminished in the bases. Has infrequent, non productive cough. Voiding spontaneously and in adequate amounts.  P: Continue POC. Alert MD of changes in status.

## 2018-01-13 NOTE — PROGRESS NOTES
Chemotherapy  D: Blood return is present per port a catheter. Urine output is adequate as recorded in intake and output flowsheet.   I: Premedications given (see electronic medical administration record). Dose #1 of 1 of weekly Daratumumab started to infuse at 50cc/hour. Will increase rate by 50cc/hour up to 200cc/hour as pt tolerates.   A: Tolerating well.  P: Continue to monitor urine output and symptoms of nausea. Screen for symptoms of toxicity.

## 2018-01-13 NOTE — PROGRESS NOTES
[facility], [place]  Hematology / Oncology Progress Note     Assessment & Plan   Jeffrey Real is a 53 y/o M with complex PMH including refractory IgA Multiple Myeloma s/p autologous transplant 08/2014, most recently on Daralex/Pom/Decadron (started 11/17), transferred from OSH d/t CHRISTIANE on CKD, persistent sinusitis, HCAP, concern for sepsis, & altered mental status.      # Delirium vs encephalopathy. On admit very lethargic/sleepy on exam. Hyperreflexic. Clonus. Likely multifactorial (kidney injury, long acting pain medications, hypercalcemia, infectious-sinusitis/PNA). Mental status waxing/waning over past few days.  - Head CT w/o contrast shows no evidence of acute intracranial pathology but + sinusitis & mild chronic small vessel ischemic disease.  - Held PTA oxycodone, dilaudid, ativan & gabapentin d/t potential sedating effects/CHRISTIANE, no withdrawal symptoms. Given reports of pain, added back oxycodone 5-10mg every 4h as needed.  - Given continued improvement, OK to d/c video patient monitoring.  - D/c'd PRN Ativan. QTc 456, using Haldol cautiously (0.5-1mg every 6 hours PRN).   - SLUMs score 20/30 per OT (abnormal) - consider repeating in coming days given improvement in past 48h.  - Neurology consulted, appreciate their input and recommendations. Video EEG obtained to r/o subclinical seizures. Difficult study given agitation, but indicates mild-to-moderate encephalopathy with no evidence of seizures seen.  - Hyperammonemic on admission, with normal liver function, so not routinely trended thereafter (70-->92-->95). Level re-checked on 1/11, slightly elevated at 65.  - Surveillance blood cultures drawn, urine culture recollected (negative when done on 1/2).   - May need LP to r/o meningeal involvement of myeloma. Unable to obtain brain MRI secondary to CHRISTIANE.  - Continue to hold acyclovir in the event it is exacerbating/contributing to AMS given impaired renal function.  - Continue  aggressive IV hydration per nephrology recs and continue to follow SCr trend.      # High Risk/Resistant Multiple Myeloma IgA kappa. S/P autologous peripheral blood stem cell transplant in 08/2014.   # Multiple lytic lesions 2/2 MM (ribs, T7, T9, T11, T12) s/p XRT to thoracolumbar spine.  # Fracture L ischium.  # Hypogammaglobulinemia s/p IVIG replacement.  S/P multiple lines of therapy including auto tx (RVD, auto+melphalan, Velcade Maintenance, VDT-PACE, followed by PCD) complex cytogenetics (17 p deletion, p53 deletion) most recently on pomalidomide/carfilzomib & dex 20+ cycles (started 02/2017) with progression in November 2017. Most recently switched to daratumumab/pomalidomide/dex.  - Plan was to try to get M-spike < 1 gram% then pursue Allogeneic transplant (saw Dr. Torre in clinic 11/27/17). Has brother as haplo-identical donor.  - 12/26/17 found to have pathologic fractures of T2 spinous process & bilateral T1 transverse process. Destructive lytic lesion involving left scapula (partially imaged).   - S/p dex and daratumumab on 1/6/18, holding Pomalyst. Per wife, this was his 3rd dose of daratumumab since initiating in November 2017. Myeloma labs from this admission compared to prior labs from Nov 2017 at OSH, ? Slightly worse.  - Dex and daratumumab per protocol today.      # Pancytopenia 2/2 to MM & related therapy. Recommended dose decrease with subsequent cycles.  - Keep HgB > 8, PLT > 10.       # Acute Kidney Injury/Failure 2/2 to ATN. Cr 4.5 at OSH (up from baseline 0.8-1.1). Likely r/t contrast (got facial bone contrast CT on 12/27 at OSH, + underlying MM) vs. worsening MM vs. Sepsis/infection. No evidence of hypotension in OSH records. Unable to see if he was anuric at OSH.  # Hyperkalemia --> RESOLVED  # Hyperphosphatemia --> improving  # Hyperuricemia --> improving  # Chronic kidney disease 2/2 to MM.  # Hx. CHRISTIANE requiring HD from sepsis.  - Avoid nephrotoxins, contrast, renally dose  medications.  - Nephrology consulted, appreciate their input and assistance. Continue hydration with D5+ 1/2NS @ 125ml/hr. No Lasix unless signs/symptoms of fluid overload.  - BMP daily. Cr improved to 2.73 today.      # Hypercalcemia. Last dose of Zometa was 12/7/17, on monthly schedule.  - Due for Zometa but holding given CHRISTIANE. Ionized calcium normalized, 5.0 on 1/9. Continue to follow. No plans to give Zometa or pamidronate currently (may consider giving pamidronate prior to discharge).    #Hypoglycemia.  -Noted intermittently. Reviewed records from OSH through [facility], appears to have occurred PTA as well. ? Due to CHRISTIANE. Continue to monitor, hypoglycemia protocol in place. Added D5 back to IV fluids with noted improvement. Consider endocrinology consult.    #Prolonged QTc  -Level on admission at 471. Repeat EKG 1/9 is 455. D/c'd Zofran, using Haldol with caution as above. Past EKGs show interval normal 8/2017, first prolonged on 12/25.      # HCAP & Sinusitis.   # Fever without neutropenia.  # RSV  # Hypogammaglobulinemia.  Streaky R lung base opacity. S/P multiple hospitalizations. Most recently 12/11-12/21, 12/25-12/30, & now 1/1- current (transfer from OSH). CT sinus + for sinusitis. Head CT 1/2/18 showing persistent sinusitis.  - Urine & blood CX NGTD from OSH. Unable to collect sputum culture.   - On Zosyn 1/2-1/4. Abx stopped given positive RSV, and negative cultures.  - Repeat blood culture & UA/UC ordered (NGTD, continue to monitor).  - ID consulted, appreciate their input. No ribavirin given for RSV d/t CHRISTIANE. Infectious workup otherwise negative.  - Given sucrose-free IVIG on 1/8.     # ID PPX. S/p Pentamidine 1/5/17. Acyclovir on hold per above (per guidelines, dose should be reduced from 400mg BID to 200mg BID if CrCl <10, but holding at this time to see if delirium improves).      # GERD, hx. Candida esophagitis.  - Protonix 40mg daily.      # Hx. Vtach s/p ICD placement.  # Hx. HTN.  - Diltiazem  60 mg BID initially held due to need for close monitoring of BPs for possible sepsis. Restarted 1/5/17, BPs stable.   - Re-start metoprolol today at 12.5mg BID    # Chronic pain 2/2 to MM. Multiple fractures/lytic lesions.  Held PTA meds d/t encephalopathy (OxyContin, Dilaudid).   - Very little pain med needs throughout admission. OK for PRN oxycodone per above.      # Peripheral neuropathy 2/2 to MM therapy. Holding PTA gabapentin.  - Continue to hold at this time.    # Major depression. Continue PTA Celexa.    # Weakness/deconditioning 2/2 to multiple hospitalizations, MM/therapy, & chronic disease.  # Chronic fatigue.  - PT/OT consult.   - TCU vs home with 24h assist. SW aware, working on referrals.     # Diarrhea  -C.diff negative.    # Mild-Moderate Malnutrition 2/2 to chronic disease/infections, multiple hospitalizations.  - Regular diet, encourage snacks/supplements.  - Juan Jose counts initiated (1/5).     FEN:   - MIVF with D5 + 1/2NS at 125ml/hr per nephrology team.  - Follow lytes closely, replace PRN  - Regular diet as tolerated - consider repeating calorie counts with improvement in mental status.     PPX (DVT/GI): protonix daily, ambulate   LINES/DRAINS: Port.  CONSULTS: Nephrology, ENT, OT, PT, ID.   CODE: Full.  DISPO: Discharge in next 2-3 days to TCU after next treatment with daratumumab and dexamethasone today.    Patient and plan of care discussed with staff attending, Dr. Guevara.    IVETTE YousifC  Hematology/Oncology  [phone]    Interval History   David reports feeling well today. Sitting in chair at bedside. Slight nosebleed this morning intermittently. States he gets these from time to time at home in the winter or dry weather. Denies pain. No shortness of breath. PO intake better.     Physical Exam   Temp: 98.3  F (36.8  C) Temp src: Oral BP: (!) 153/94   Heart Rate: 99 Resp: 18 SpO2: 98 % O2 Device: None (Room air)    Vitals:    01/11/18 1159 01/12/18 0900 01/13/18 0900   Weight: 77.2 kg  (170 lb 3.2 oz) 77.1 kg (170 lb) 75.8 kg (167 lb)     Vital Signs with Ranges  Temp:  [98.1  F (36.7  C)-99.8  F (37.7  C)] 98.3  F (36.8  C)  Heart Rate:  [78-99] 99  Resp:  [16-20] 18  BP: (130-153)/(77-94) 153/94  SpO2:  [92 %-98 %] 98 %  I/O last 3 completed shifts:  In: 3100 [P.O.:100; I.V.:3000]  Out: 3075 [Urine:3075]    Constitutional: Seen sitting in chair at bedside, alert, cooperative, interactive.  Respiratory: No increased work of breathing, good air exchange.  Cardiovascular: Regular rate and rhythm. No murmur.  Abdominal: + bowel sounds, soft, non-tender, non-distended.  Musculoskeletal: No LE edema bilaterally.  Neuropsychiatric: Alert, awake, oriented x 3. No focal neurologic deficits, cranial nerves intact as tested.    Medications     - MEDICATION INSTRUCTIONS -       NaCl       dextrose 5% and 0.45% NaCl 125 mL/hr at 01/13/18 0618     - MEDICATION INSTRUCTIONS -         daratumumab (DARZALEX) infusion  16 mg/kg (Treatment Plan Recorded) Intravenous Once     pantoprazole  40 mg Oral QAM AC     diltiazem  60 mg Oral BID     sodium chloride (PF)  20 mL Intracatheter Q8H     heparin lock flush  5-10 mL Intracatheter Q24H     heparin  5 mL Intracatheter Q28 Days     citalopram (celeXA) tablet 40 mg  40 mg Oral Daily       Data   Results for orders placed or performed during the hospital encounter of 01/02/18 (from the past 24 hour(s))   Potassium   Result Value Ref Range    Potassium 3.3 (L) 3.4 - 5.3 mmol/L   CBC with platelets differential   Result Value Ref Range    WBC 2.0 (L) 4.0 - 11.0 10e9/L    RBC Count 3.00 (L) 4.4 - 5.9 10e12/L    Hemoglobin 8.9 (L) 13.3 - 17.7 g/dL    Hematocrit 28.1 (L) 40.0 - 53.0 %    MCV 94 78 - 100 fl    MCH 29.7 26.5 - 33.0 pg    MCHC 31.7 31.5 - 36.5 g/dL    RDW 17.0 (H) 10.0 - 15.0 %    Platelet Count 50 (L) 150 - 450 10e9/L    Diff Method Manual Differential     % Neutrophils 73.0 %    % Lymphocytes 5.0 %    % Monocytes 11.0 %    % Eosinophils 2.0 %    % Basophils  0.0 %    % Myelocytes 7.0 %    % Promyelocytes 2.0 %    Absolute Neutrophil 1.5 (L) 1.6 - 8.3 10e9/L    Absolute Lymphocytes 0.1 (L) 0.8 - 5.3 10e9/L    Absolute Monocytes 0.2 0.0 - 1.3 10e9/L    Absolute Eosinophils 0.0 0.0 - 0.7 10e9/L    Absolute Basophils 0.0 0.0 - 0.2 10e9/L    Absolute Myelocytes 0.1 (H) 0 10e9/L    Absolute Promyeloctyes 0.0 0 10e9/L    Anisocytosis Slight     Rouleaux Increased    Basic metabolic panel   Result Value Ref Range    Sodium 136 133 - 144 mmol/L    Potassium 3.2 (L) 3.4 - 5.3 mmol/L    Chloride 104 94 - 109 mmol/L    Carbon Dioxide 22 20 - 32 mmol/L    Anion Gap 11 3 - 14 mmol/L    Glucose 94 70 - 99 mg/dL    Urea Nitrogen 19 7 - 30 mg/dL    Creatinine 2.73 (H) 0.66 - 1.25 mg/dL    GFR Estimate 25 (L) >60 mL/min/1.7m2    GFR Estimate If Black 30 (L) >60 mL/min/1.7m2    Calcium 10.2 (H) 8.5 - 10.1 mg/dL   Uric acid   Result Value Ref Range    Uric Acid 7.0 3.5 - 7.2 mg/dL   Magnesium   Result Value Ref Range    Magnesium 1.9 1.6 - 2.3 mg/dL   Phosphorus   Result Value Ref Range    Phosphorus 4.8 (H) 2.5 - 4.5 mg/dL       Staff Addendum: Patient was seen and examined by me. All labs, VS and pertinent images were reviewed with the team on rounds. Plans for care were mutually developed and outlined above with my direct input.    Interval Events/ROS: Interactive with family today. +epistaxis but more dripping continuously. Remains alert and oriented today. Answering orientation questions.  Rest as above.     Exam: AAO/NAD, cooperative with exam. OP dry. Lungs were clear, HRRR, Abd soft/NT/ND, no HSM or masses, No LE edema, Nro grossly non-focal, Skin exam was without rash.    A/P: 51yo M with IgA MM, relapsed after auto in 2014, here with new onset CHRISTIANE and waxing/waning MS.  - Monitor given that he is better and probably ability to d/c.  - Will give Daratumumab today and monitor. If no mental status changes will likely d/c Mon/Tues once TCU placement (preferably here per wife so that  he can remain on chemo).   - Will need to work out other Zoster prophy if that is felt to be the case.    - Consider low-dose CCB if BP still remaining high   - Rest as above.     Tony Guevara, DO

## 2018-01-14 ENCOUNTER — APPOINTMENT (OUTPATIENT)
Dept: PHYSICAL THERAPY | Facility: CLINIC | Age: 53
DRG: 871 | End: 2018-01-14
Attending: INTERNAL MEDICINE
Payer: COMMERCIAL

## 2018-01-14 LAB
ANION GAP SERPL CALCULATED.3IONS-SCNC: 12 MMOL/L (ref 3–14)
ANISOCYTOSIS BLD QL SMEAR: SLIGHT
BASOPHILS # BLD AUTO: 0 10E9/L (ref 0–0.2)
BASOPHILS NFR BLD AUTO: 0 %
BUN SERPL-MCNC: 17 MG/DL (ref 7–30)
CALCIUM SERPL-MCNC: 10.4 MG/DL (ref 8.5–10.1)
CHLORIDE SERPL-SCNC: 101 MMOL/L (ref 94–109)
CO2 SERPL-SCNC: 20 MMOL/L (ref 20–32)
CREAT SERPL-MCNC: 2.39 MG/DL (ref 0.66–1.25)
DIFFERENTIAL METHOD BLD: ABNORMAL
EOSINOPHIL # BLD AUTO: 0 10E9/L (ref 0–0.7)
EOSINOPHIL NFR BLD AUTO: 0 %
ERYTHROCYTE [DISTWIDTH] IN BLOOD BY AUTOMATED COUNT: 17.1 % (ref 10–15)
GFR SERPL CREATININE-BSD FRML MDRD: 29 ML/MIN/1.7M2
GLUCOSE SERPL-MCNC: 111 MG/DL (ref 70–99)
HCT VFR BLD AUTO: 25.5 % (ref 40–53)
HGB BLD-MCNC: 8.1 G/DL (ref 13.3–17.7)
LYMPHOCYTES # BLD AUTO: 0.1 10E9/L (ref 0.8–5.3)
LYMPHOCYTES NFR BLD AUTO: 3 %
MAGNESIUM SERPL-MCNC: 2.1 MG/DL (ref 1.6–2.3)
MCH RBC QN AUTO: 29.8 PG (ref 26.5–33)
MCHC RBC AUTO-ENTMCNC: 31.8 G/DL (ref 31.5–36.5)
MCV RBC AUTO: 94 FL (ref 78–100)
METAMYELOCYTES # BLD: 0.1 10E9/L
METAMYELOCYTES NFR BLD MANUAL: 2 %
MONOCYTES # BLD AUTO: 0.3 10E9/L (ref 0–1.3)
MONOCYTES NFR BLD AUTO: 9 %
MYELOCYTES # BLD: 0 10E9/L
MYELOCYTES NFR BLD MANUAL: 1 %
NEUTROPHILS # BLD AUTO: 2.4 10E9/L (ref 1.6–8.3)
NEUTROPHILS NFR BLD AUTO: 84 %
NRBC # BLD AUTO: 0 10*3/UL
NRBC BLD AUTO-RTO: 1 /100
PHOSPHATE SERPL-MCNC: 4.5 MG/DL (ref 2.5–4.5)
PLATELET # BLD AUTO: 46 10E9/L (ref 150–450)
POTASSIUM SERPL-SCNC: 3.3 MMOL/L (ref 3.4–5.3)
PROMYELOCYTES # BLD MANUAL: 0 10E9/L
PROMYELOCYTES NFR BLD MANUAL: 1 %
RBC # BLD AUTO: 2.72 10E12/L (ref 4.4–5.9)
ROULEAUX BLD QL SMEAR: ABNORMAL
SODIUM SERPL-SCNC: 134 MMOL/L (ref 133–144)
URATE SERPL-MCNC: 6 MG/DL (ref 3.5–7.2)
WBC # BLD AUTO: 2.8 10E9/L (ref 4–11)

## 2018-01-14 PROCEDURE — 36592 COLLECT BLOOD FROM PICC: CPT | Performed by: NURSE PRACTITIONER

## 2018-01-14 PROCEDURE — 84550 ASSAY OF BLOOD/URIC ACID: CPT | Performed by: NURSE PRACTITIONER

## 2018-01-14 PROCEDURE — 25000132 ZZH RX MED GY IP 250 OP 250 PS 637

## 2018-01-14 PROCEDURE — 80048 BASIC METABOLIC PNL TOTAL CA: CPT | Performed by: NURSE PRACTITIONER

## 2018-01-14 PROCEDURE — 25000132 ZZH RX MED GY IP 250 OP 250 PS 637: Performed by: NURSE PRACTITIONER

## 2018-01-14 PROCEDURE — 83735 ASSAY OF MAGNESIUM: CPT | Performed by: NURSE PRACTITIONER

## 2018-01-14 PROCEDURE — 84100 ASSAY OF PHOSPHORUS: CPT | Performed by: NURSE PRACTITIONER

## 2018-01-14 PROCEDURE — 85025 COMPLETE CBC W/AUTO DIFF WBC: CPT | Performed by: NURSE PRACTITIONER

## 2018-01-14 PROCEDURE — 40000193 ZZH STATISTIC PT WARD VISIT: Performed by: PHYSICAL THERAPIST

## 2018-01-14 PROCEDURE — 25800025 ZZH RX 258: Performed by: PHYSICIAN ASSISTANT

## 2018-01-14 PROCEDURE — 97530 THERAPEUTIC ACTIVITIES: CPT | Mod: GP | Performed by: PHYSICAL THERAPIST

## 2018-01-14 PROCEDURE — 97116 GAIT TRAINING THERAPY: CPT | Mod: GP | Performed by: PHYSICAL THERAPIST

## 2018-01-14 PROCEDURE — 25000132 ZZH RX MED GY IP 250 OP 250 PS 637: Performed by: PHYSICIAN ASSISTANT

## 2018-01-14 PROCEDURE — 25000132 ZZH RX MED GY IP 250 OP 250 PS 637: Performed by: INTERNAL MEDICINE

## 2018-01-14 PROCEDURE — 12000008 ZZH R&B INTERMEDIATE UMMC

## 2018-01-14 RX ORDER — POTASSIUM CHLORIDE 750 MG/1
30 TABLET, EXTENDED RELEASE ORAL ONCE
Status: COMPLETED | OUTPATIENT
Start: 2018-01-14 | End: 2018-01-14

## 2018-01-14 RX ORDER — ZOLPIDEM TARTRATE 5 MG/1
5 TABLET ORAL
Status: DISCONTINUED | OUTPATIENT
Start: 2018-01-14 | End: 2018-01-15

## 2018-01-14 RX ADMIN — DILTIAZEM HYDROCHLORIDE 60 MG: 60 TABLET, FILM COATED ORAL at 20:32

## 2018-01-14 RX ADMIN — WHITE PETROLATUM: 1.75 OINTMENT TOPICAL at 08:24

## 2018-01-14 RX ADMIN — POTASSIUM CHLORIDE 30 MEQ: 750 TABLET, EXTENDED RELEASE ORAL at 08:13

## 2018-01-14 RX ADMIN — METOPROLOL TARTRATE 12.5 MG: 25 TABLET, FILM COATED ORAL at 08:13

## 2018-01-14 RX ADMIN — ZOLPIDEM TARTRATE 5 MG: 5 TABLET, FILM COATED ORAL at 21:45

## 2018-01-14 RX ADMIN — OXYCODONE HYDROCHLORIDE 5 MG: 5 TABLET ORAL at 20:32

## 2018-01-14 RX ADMIN — CLOTRIMAZOLE 1 TROCHE: 10 LOZENGE ORAL at 20:33

## 2018-01-14 RX ADMIN — DEXTROSE AND SODIUM CHLORIDE: 5; 450 INJECTION, SOLUTION INTRAVENOUS at 06:46

## 2018-01-14 RX ADMIN — OXYCODONE HYDROCHLORIDE 10 MG: 5 TABLET ORAL at 13:45

## 2018-01-14 RX ADMIN — METOPROLOL TARTRATE 12.5 MG: 25 TABLET, FILM COATED ORAL at 20:32

## 2018-01-14 RX ADMIN — PANTOPRAZOLE SODIUM 40 MG: 40 TABLET, DELAYED RELEASE ORAL at 08:13

## 2018-01-14 RX ADMIN — DEXTROSE AND SODIUM CHLORIDE: 5; 450 INJECTION, SOLUTION INTRAVENOUS at 16:48

## 2018-01-14 RX ADMIN — OXYMETAZOLINE HYDROCHLORIDE 2 SPRAY: 5 SPRAY NASAL at 06:47

## 2018-01-14 RX ADMIN — DILTIAZEM HYDROCHLORIDE 60 MG: 60 TABLET, FILM COATED ORAL at 08:12

## 2018-01-14 RX ADMIN — CITALOPRAM HYDROBROMIDE 40 MG: 20 TABLET ORAL at 08:13

## 2018-01-14 RX ADMIN — OXYMETAZOLINE HYDROCHLORIDE 2 SPRAY: 5 SPRAY NASAL at 20:35

## 2018-01-14 ASSESSMENT — PAIN DESCRIPTION - DESCRIPTORS
DESCRIPTORS: ACHING

## 2018-01-14 NOTE — PLAN OF CARE
Problem: Patient Care Overview  Goal: Plan of Care/Patient Progress Review  Outcome: No Change  Pt had elevated blood pressures this shift. CRIS notified and scheduled lopressor was started. Pt had no adverse s/sx with the high blood pressures. All other VSS.     Pt had intermittent nose bleed this shift. CRIS and MD notified, scheduled afrin nasal spray and PRN aquaphor ointment to nares ordered. Pt also given nose clip to use if bleeding. Ice bag applied to bridge of nose as well. Later in shift, nose bleeds did decrease with interventions listed.     Pt AOx4 this shift, communication was clear with me during interactions. Pt ambulated in moore and off unit with family members and tolerated activity.     Pt continues on both contact and droplet precautions. Pt oral intake fair-good. Encouraged pt to try to increase fluid intake. Pt has D5 1/2 ns infusing at 125/hr. Port access site C/D/I with good blood return noted with saline flush.     Pt magnesium level this am was 1.9, replaced per protocol, recheck tomorrow am. Creatinine level today 2.73, potassium 3.2. CRIS and MD aware.     Pt denied pain this shift. Received scheduled chemo  daratumumab today with no adverse reactions noted.     Pt up with assist x 1 and walker, bed and chair alarm used. Pt using call light appropriately for assistance. Continue with current POC per MD orders.

## 2018-01-14 NOTE — PROGRESS NOTES
[facility], [place]  Hematology / Oncology Progress Note     Assessment & Plan   Jeffrey Real is a 53 y/o M with complex PMH including refractory IgA Multiple Myeloma s/p autologous transplant 08/2014, most recently on Daralex/Pom/Decadron (started 11/17), transferred from OSH d/t CHRISTIANE on CKD, persistent sinusitis, HCAP, concern for sepsis, & altered mental status.      # Delirium vs encephalopathy. On admit very lethargic/sleepy on exam. Hyperreflexic. Clonus. Likely multifactorial (kidney injury, long acting pain medications, hypercalcemia, infectious-sinusitis/PNA). Mental status waxing/waning over past few days.  - Head CT w/o contrast shows no evidence of acute intracranial pathology but + sinusitis & mild chronic small vessel ischemic disease.  - Held PTA oxycodone, dilaudid, ativan & gabapentin d/t potential sedating effects/CHRISTIANE, no withdrawal symptoms. Given reports of pain, added back oxycodone 5-10mg every 4h as needed.  - Given continued improvement, OK to d/c video patient monitoring.  - D/c'd PRN Ativan. QTc 456, using Haldol cautiously (0.5-1mg every 6 hours PRN).   - SLUMs score 20/30 per OT (abnormal) - repeat MMSE ordered for today 1/14.  - Neurology consulted, appreciate their input and recommendations. Video EEG obtained to r/o subclinical seizures. Difficult study given agitation, but indicates mild-to-moderate encephalopathy with no evidence of seizures seen.  - Hyperammonemic on admission, with normal liver function, so not routinely trended thereafter (70-->92-->95). Level re-checked on 1/11, slightly elevated at 65.  - Surveillance blood cultures drawn, urine culture recollected (negative when done on 1/2).   - May need LP to r/o meningeal involvement of myeloma. Unable to obtain brain MRI secondary to CHRISTIANE.  - Continue to hold acyclovir in the event it is exacerbating/contributing to AMS given impaired renal function.  - Continue IV hydration per nephrology recs  and continue to follow SCr trend.      # High Risk/Resistant Multiple Myeloma IgA kappa. S/P autologous peripheral blood stem cell transplant in 08/2014.   # Multiple lytic lesions 2/2 MM (ribs, T7, T9, T11, T12) s/p XRT to thoracolumbar spine.  # Fracture L ischium.  # Hypogammaglobulinemia s/p IVIG replacement.  S/P multiple lines of therapy including auto tx (RVD, auto+melphalan, Velcade Maintenance, VDT-PACE, followed by PCD) complex cytogenetics (17 p deletion, p53 deletion) most recently on pomalidomide/carfilzomib & dex 20+ cycles (started 02/2017) with progression in November 2017. Most recently switched to daratumumab/pomalidomide/dex.  - Plan was to try to get M-spike < 1 gram% then pursue Allogeneic transplant (saw Dr. Torre in clinic 11/27/17). Has brother as haplo-identical donor.  - 12/26/17 found to have pathologic fractures of T2 spinous process & bilateral T1 transverse process. Destructive lytic lesion involving left scapula (partially imaged).   - S/p dex and daratumumab on 1/6/18, holding Pomalyst. Per wife, this was his 3rd dose of daratumumab since initiating in November 2017. Myeloma labs from this admission compared to prior labs from Nov 2017 at OSH, ? Slightly worse.  - Dex and daratumumab per protocol given on 1/13.      # Pancytopenia 2/2 to MM & related therapy. Recommended dose decrease with subsequent cycles.  - Keep HgB > 8, PLT > 10.       # Acute Kidney Injury/Failure 2/2 to ATN. Cr 4.5 at OSH (up from baseline 0.8-1.1). Likely r/t contrast (got facial bone contrast CT on 12/27 at OSH, + underlying MM) vs. worsening MM vs. Sepsis/infection. No evidence of hypotension in OSH records. Unable to see if he was anuric at OSH.  # Hyperkalemia --> RESOLVED, now HYPOkalemic  # Hyperphosphatemia --> improving  # Hyperuricemia --> improving  # Chronic kidney disease 2/2 to MM.  # Hx. CHRISTIANE requiring HD from sepsis.  - Avoid nephrotoxins, contrast, renally dose medications.  - Nephrology  consulted, appreciate their input and assistance. Continue hydration with D5+ 1/2NS; reduce rate to 75ml/hr and encourage PO fluid intake. No Lasix unless signs/symptoms of fluid overload.  - BMP daily. Cr improved to 2.39 today.      # Hypercalcemia. Last dose of Zometa was 12/7/17, on monthly schedule.  - Due for Zometa but holding given CHRISTIANE. Ionized calcium normalized, 5.0 on 1/9. Continue to follow. No plans to give Zometa or pamidronate currently (may consider giving pamidronate prior to discharge).    #Hypoglycemia.  -Noted intermittently. Reviewed records from OSH through [facility], appears to have occurred PTA as well. ? Due to CHRISTIANE. Continue to monitor, hypoglycemia protocol in place. Added D5 back to IV fluids with noted improvement. Consider endocrinology consult.    #Prolonged QTc  -Level on admission at 471. Repeat EKG 1/9 is 455. D/c'd Zofran, using Haldol with caution as above. Past EKGs show interval normal 8/2017, first prolonged on 12/25.      # HCAP & Sinusitis.   # Fever without neutropenia.  # RSV  # Hypogammaglobulinemia.  Streaky R lung base opacity. S/P multiple hospitalizations. Most recently 12/11-12/21, 12/25-12/30, & now 1/1- current (transfer from OSH). CT sinus + for sinusitis. Head CT 1/2/18 showing persistent sinusitis.  - Urine & blood CX NGTD from OSH. Unable to collect sputum culture.   - On Zosyn 1/2-1/4. Abx stopped given positive RSV, and negative cultures.  - Repeat blood culture & UA/UC ordered (NGTD, continue to monitor).  - ID consulted, appreciate their input. No ribavirin given for RSV d/t CHRISTIANE. Infectious workup otherwise negative.  - S/p sucrose-free IVIG on 1/8.     # ID PPX. S/p Pentamidine 1/5/17. Acyclovir on hold per above (per guidelines, dose should be reduced from 400mg BID to 200mg BID if CrCl <10, but holding at this time to see if delirium improves).      # GERD, hx. Candida esophagitis.  - Protonix 40mg daily.      # Hx. Vtach s/p ICD placement.  # Hx.  HTN.  - Diltiazem 60 mg BID initially held due to need for close monitoring of BPs for possible sepsis. Restarted 1/5/17, BPs stable, now hypertensive.   - Re-started metoprolol 1/13 at 12.5mg BID, improved.    # Chronic pain 2/2 to MM. Multiple fractures/lytic lesions.  Held PTA meds d/t encephalopathy (OxyContin, Dilaudid).   - Very little pain med needs throughout admission. OK for PRN oxycodone per above.      # Peripheral neuropathy 2/2 to MM therapy. Holding PTA gabapentin.  - Continue to hold at this time.    # Major depression. Continue PTA Celexa.    # Weakness/deconditioning 2/2 to multiple hospitalizations, MM/therapy, & chronic disease.  # Chronic fatigue.  - PT/OT consult.   - TCU vs home with 24h assist. SW aware, working on referrals. Patient encouraged to work with PT/OT     # Diarrhea  -C.diff negative.    # Mild-Moderate Malnutrition 2/2 to chronic disease/infections, multiple hospitalizations.  - Regular diet, encourage snacks/supplements.  - Juan Jose counts initiated (1/5).     FEN:   - MIVF with D5 + 1/2NS at 75ml/hr  - Follow lytes closely, replace PRN  - Regular diet as tolerated - consider repeating calorie counts with improvement in mental status.     PPX (DVT/GI): protonix daily, ambulate   LINES/DRAINS: Port.  CONSULTS: Nephrology, ENT, OT, PT, ID.   CODE: Full.  DISPO: Discharge in next 2-3 days to TCU.    Patient and plan of care discussed with staff attending, Dr. Guevara.    Erna Almeida PA-C  Hematology/Oncology  [phone]    Interval History   David is doing well today. Slept well. Alert, oriented x 3. Still with intermittent nose bleeds which are controlled with pressure. Denies any pain. No shortness of breath or cough. Denies nausea, vomiting or bowel changes. Really wants to go home if possible.     Physical Exam   Temp: 97.9  F (36.6  C) Temp src: Oral BP: 136/83 Pulse: 98 Heart Rate: 79 Resp: 16 SpO2: 97 % O2 Device: None (Room air)    Vitals:    01/12/18 0900 01/13/18 0900  01/14/18 0700   Weight: 77.1 kg (170 lb) 75.8 kg (167 lb) 75.8 kg (167 lb 1.6 oz)     Vital Signs with Ranges  Temp:  [96.4  F (35.8  C)-99  F (37.2  C)] 97.9  F (36.6  C)  Pulse:  [98] 98  Heart Rate:  [] 79  Resp:  [16-20] 16  BP: (130-149)/(78-98) 136/83  SpO2:  [94 %-100 %] 97 %  I/O last 3 completed shifts:  In: 4530 [P.O.:2320; I.V.:2210]  Out: 2375 [Urine:2375]    Constitutional: Seen lying in bed, alert, cooperative, interactive.  ENT: Dried blood to L nare. Mucous membranes pink and moist, no lesions, no thrush.  Respiratory: No increased work of breathing, good air exchange.  Cardiovascular: Regular rate and rhythm. No murmur.  Abdominal: + bowel sounds, soft, non-tender, non-distended.  Musculoskeletal: No LE edema bilaterally.  Neuropsychiatric: Alert, awake, oriented x 3. No focal neurologic deficits, cranial nerves intact as tested.    Medications     - MEDICATION INSTRUCTIONS -       NaCl       dextrose 5% and 0.45% NaCl 125 mL/hr at 01/14/18 0646     - MEDICATION INSTRUCTIONS -         metoprolol tartrate  12.5 mg Oral BID     oxymetazoline  2 spray Both Nostrils BID     pantoprazole  40 mg Oral QAM AC     diltiazem  60 mg Oral BID     sodium chloride (PF)  20 mL Intracatheter Q8H     heparin lock flush  5-10 mL Intracatheter Q24H     heparin  5 mL Intracatheter Q28 Days     citalopram (celeXA) tablet 40 mg  40 mg Oral Daily       Data   Results for orders placed or performed during the hospital encounter of 01/02/18 (from the past 24 hour(s))   CBC with platelets differential   Result Value Ref Range    WBC 2.8 (L) 4.0 - 11.0 10e9/L    RBC Count 2.72 (L) 4.4 - 5.9 10e12/L    Hemoglobin 8.1 (L) 13.3 - 17.7 g/dL    Hematocrit 25.5 (L) 40.0 - 53.0 %    MCV 94 78 - 100 fl    MCH 29.8 26.5 - 33.0 pg    MCHC 31.8 31.5 - 36.5 g/dL    RDW 17.1 (H) 10.0 - 15.0 %    Platelet Count 46 (LL) 150 - 450 10e9/L    Diff Method Manual Differential     % Neutrophils 84.0 %    % Lymphocytes 3.0 %    % Monocytes  9.0 %    % Eosinophils 0.0 %    % Basophils 0.0 %    % Metamyelocytes 2.0 %    % Myelocytes 1.0 %    % Promyelocytes 1.0 %    Nucleated RBCs 1 (H) 0 /100    Absolute Neutrophil 2.4 1.6 - 8.3 10e9/L    Absolute Lymphocytes 0.1 (L) 0.8 - 5.3 10e9/L    Absolute Monocytes 0.3 0.0 - 1.3 10e9/L    Absolute Eosinophils 0.0 0.0 - 0.7 10e9/L    Absolute Basophils 0.0 0.0 - 0.2 10e9/L    Absolute Metamyelocytes 0.1 (H) 0 10e9/L    Absolute Myelocytes 0.0 0 10e9/L    Absolute Promyeloctyes 0.0 0 10e9/L    Absolute Nucleated RBC 0.0     Anisocytosis Slight     Rouleaux Increased    Basic metabolic panel   Result Value Ref Range    Sodium 134 133 - 144 mmol/L    Potassium 3.3 (L) 3.4 - 5.3 mmol/L    Chloride 101 94 - 109 mmol/L    Carbon Dioxide 20 20 - 32 mmol/L    Anion Gap 12 3 - 14 mmol/L    Glucose 111 (H) 70 - 99 mg/dL    Urea Nitrogen 17 7 - 30 mg/dL    Creatinine 2.39 (H) 0.66 - 1.25 mg/dL    GFR Estimate 29 (L) >60 mL/min/1.7m2    GFR Estimate If Black 35 (L) >60 mL/min/1.7m2    Calcium 10.4 (H) 8.5 - 10.1 mg/dL   Uric acid   Result Value Ref Range    Uric Acid 6.0 3.5 - 7.2 mg/dL   Magnesium   Result Value Ref Range    Magnesium 2.1 1.6 - 2.3 mg/dL   Phosphorus   Result Value Ref Range    Phosphorus 4.5 2.5 - 4.5 mg/dL       Staff Addendum: Patient was seen and examined by me. All labs, VS and pertinent images were reviewed with the team on rounds. Plans for care were mutually developed and outlined above with my direct input.    Interval Events/ROS: Interactive and walking halls today with assist. Questions, responds appropriately. +epistaxis still but the Afrin helps. Trouble sleeping. Rest as above.     Exam: AAO/NAD, cooperative with exam. OP dry. Lungs were clear, HRRR, Abd soft/NT/ND, no HSM or masses, No LE edema, Nro grossly non-focal, Skin exam was without rash.    A/P: 51yo M with IgA MM, relapsed after auto in 2014, here with new onset CHRISTIANE and waxing/waning MS.  - Monitor given that he is better and probably  able to d/c soon.  - No issues with Daratumumab or the steroids with. Likely change in MS was due to ACV in the setting of renal dysfunction vs. just the renal dysfunction which continues to improve today.   - Will need to work out other Zoster prophy if that is felt to be the case.    - Consider low-dose B-blocker for his HTN.   - Rest as above; Home/TCU soon it seems.     Tony Guevara, DO

## 2018-01-14 NOTE — PLAN OF CARE
Problem: Patient Care Overview  Goal: Plan of Care/Patient Progress Review  Outcome: No Change  Pt BP elevated twice this shift but not to the level that PRN antihypertension was needed. AOVSS. Pt complained of back pain x 1 this shift, given PRN PO 10 mg oxycodone with pt reporting decreased pain at reassessment.     Pt potassium level this am, 3.3, CRIS ordered one time dose of 30 mEq potassium PO and recheck tomorrow am.     IVF decreased today to 75mlhr. Pt appetite poor, needed frequent encouragement to eat and drink. Pt voiding adequately.  Occasionally dribbles and has some incontinence. Adult pad on pt. Frequent toileting offered.     Slums 20/30 per OT according to MD notes so MMSE (Mini Mental State Exam used to test for dementia) ordered.  Pt was AOx 4 for me this shift. Used call light appropriately for assistance except one time was found attempting to get up alone. Bed alarm used all shift.  Per MD notes, neuroloogy consult placed.      Pt up with staff assist x1, use of walker and gait belt. Pt has generalized weakness. Encouraged to increase activity as tolerated with staff assist.     Only had 2 episodes of slight bleeding from nose this shift. Afrin as scheduled as well as PRN Aquaphor applied to each nostril.     Pt reported not sleeping last night. MD ordered Ambien PRN for sleep. Per pt and wife request will ask oncoming RN to give this to pt at bedtime.     Continue with current POC per MD notes.

## 2018-01-14 NOTE — PLAN OF CARE
Problem: Patient Care Overview  Goal: Plan of Care/Patient Progress Review  Discharge Planner PT   Patient plan for discharge: Home with assist from spouse; OP PT  Current status: Pt tx supine<>sit IND, min VCs for spinal precautions. Pt tx sit<>stand mod I, amb ~500 ft with FWW and SBA, and performed step-ups with CGA x 8 reps. Pt with improved obstacle negotiation and safety awareness during hallway amb, however walks at very slowed poncho and with difficulty modulating gait speed.  Barriers to return to prior living situation: Higher level balance impaired; spouse endorses that she can provide SBA for all mobility at home.  Recommendations for discharge: Home with assist from spouse; OP Ca Rehab  Rationale for recommendations: Pt would benefit from OP Ca rehab to progress higher level balance, strength and endurance in order to improve IND with community mobility.       Entered by: Dulce Sofia 01/14/2018 10:15 AM

## 2018-01-14 NOTE — DOWNTIME EVENT NOTE
The EMR was down for 2 hours on 1/14/2018.    Karena Tompkins was responsible for completing the paper charting during this time period.     The following information was re-entered into the system by Karena Tompkins: Flowsheet data and Intake and output    The following information will remain in the paper chart: none    Karena Tompkins  1/14/2018

## 2018-01-14 NOTE — PLAN OF CARE
Problem: Patient Care Overview  Goal: Plan of Care/Patient Progress Review  Outcome: No Change    AVSS, afebrile. Denies pain, nausea. Pt oriented x 4, forgetful sometimes, bed alarm on for safety. Voiding well. Cont POC.

## 2018-01-15 ENCOUNTER — APPOINTMENT (OUTPATIENT)
Dept: PHYSICAL THERAPY | Facility: CLINIC | Age: 53
DRG: 871 | End: 2018-01-15
Attending: INTERNAL MEDICINE
Payer: COMMERCIAL

## 2018-01-15 ENCOUNTER — APPOINTMENT (OUTPATIENT)
Dept: OCCUPATIONAL THERAPY | Facility: CLINIC | Age: 53
DRG: 871 | End: 2018-01-15
Attending: INTERNAL MEDICINE
Payer: COMMERCIAL

## 2018-01-15 LAB
ANION GAP SERPL CALCULATED.3IONS-SCNC: 12 MMOL/L (ref 3–14)
ANISOCYTOSIS BLD QL SMEAR: SLIGHT
BASOPHILS # BLD AUTO: 0 10E9/L (ref 0–0.2)
BASOPHILS NFR BLD AUTO: 0 %
BUN SERPL-MCNC: 18 MG/DL (ref 7–30)
CALCIUM SERPL-MCNC: 10.6 MG/DL (ref 8.5–10.1)
CHLORIDE SERPL-SCNC: 103 MMOL/L (ref 94–109)
CO2 SERPL-SCNC: 22 MMOL/L (ref 20–32)
CREAT SERPL-MCNC: 2.26 MG/DL (ref 0.66–1.25)
DIFFERENTIAL METHOD BLD: ABNORMAL
EOSINOPHIL # BLD AUTO: 0 10E9/L (ref 0–0.7)
EOSINOPHIL NFR BLD AUTO: 0 %
ERYTHROCYTE [DISTWIDTH] IN BLOOD BY AUTOMATED COUNT: 17.2 % (ref 10–15)
GFR SERPL CREATININE-BSD FRML MDRD: 31 ML/MIN/1.7M2
GLUCOSE SERPL-MCNC: 87 MG/DL (ref 70–99)
HCT VFR BLD AUTO: 28.4 % (ref 40–53)
HGB BLD-MCNC: 8.7 G/DL (ref 13.3–17.7)
LYMPHOCYTES # BLD AUTO: 0.1 10E9/L (ref 0.8–5.3)
LYMPHOCYTES NFR BLD AUTO: 6 %
MAGNESIUM SERPL-MCNC: 1.9 MG/DL (ref 1.6–2.3)
MCH RBC QN AUTO: 29.2 PG (ref 26.5–33)
MCHC RBC AUTO-ENTMCNC: 30.6 G/DL (ref 31.5–36.5)
MCV RBC AUTO: 95 FL (ref 78–100)
METAMYELOCYTES # BLD: 0 10E9/L
METAMYELOCYTES NFR BLD MANUAL: 2 %
MONOCYTES # BLD AUTO: 0.2 10E9/L (ref 0–1.3)
MONOCYTES NFR BLD AUTO: 10 %
MYELOCYTES # BLD: 0.1 10E9/L
MYELOCYTES NFR BLD MANUAL: 3 %
NEUTROPHILS # BLD AUTO: 1.5 10E9/L (ref 1.6–8.3)
NEUTROPHILS NFR BLD AUTO: 77 %
PHOSPHATE SERPL-MCNC: 4.6 MG/DL (ref 2.5–4.5)
PLATELET # BLD AUTO: 40 10E9/L (ref 150–450)
POTASSIUM SERPL-SCNC: 3.6 MMOL/L (ref 3.4–5.3)
PROMYELOCYTES # BLD MANUAL: 0 10E9/L
PROMYELOCYTES NFR BLD MANUAL: 2 %
RBC # BLD AUTO: 2.98 10E12/L (ref 4.4–5.9)
ROULEAUX BLD QL SMEAR: ABNORMAL
SODIUM SERPL-SCNC: 137 MMOL/L (ref 133–144)
URATE SERPL-MCNC: 6.2 MG/DL (ref 3.5–7.2)
WBC # BLD AUTO: 1.9 10E9/L (ref 4–11)

## 2018-01-15 PROCEDURE — 84550 ASSAY OF BLOOD/URIC ACID: CPT | Performed by: NURSE PRACTITIONER

## 2018-01-15 PROCEDURE — 84100 ASSAY OF PHOSPHORUS: CPT | Performed by: NURSE PRACTITIONER

## 2018-01-15 PROCEDURE — 97535 SELF CARE MNGMENT TRAINING: CPT | Mod: GO | Performed by: OCCUPATIONAL THERAPIST

## 2018-01-15 PROCEDURE — 40000133 ZZH STATISTIC OT WARD VISIT: Performed by: OCCUPATIONAL THERAPIST

## 2018-01-15 PROCEDURE — 40000193 ZZH STATISTIC PT WARD VISIT: Performed by: PHYSICAL THERAPIST

## 2018-01-15 PROCEDURE — 85025 COMPLETE CBC W/AUTO DIFF WBC: CPT | Performed by: NURSE PRACTITIONER

## 2018-01-15 PROCEDURE — 83735 ASSAY OF MAGNESIUM: CPT | Performed by: NURSE PRACTITIONER

## 2018-01-15 PROCEDURE — 25000132 ZZH RX MED GY IP 250 OP 250 PS 637: Performed by: PHYSICIAN ASSISTANT

## 2018-01-15 PROCEDURE — 80048 BASIC METABOLIC PNL TOTAL CA: CPT | Performed by: NURSE PRACTITIONER

## 2018-01-15 PROCEDURE — 25000132 ZZH RX MED GY IP 250 OP 250 PS 637

## 2018-01-15 PROCEDURE — 97530 THERAPEUTIC ACTIVITIES: CPT | Mod: GO | Performed by: OCCUPATIONAL THERAPIST

## 2018-01-15 PROCEDURE — 25000132 ZZH RX MED GY IP 250 OP 250 PS 637: Performed by: NURSE PRACTITIONER

## 2018-01-15 PROCEDURE — 97750 PHYSICAL PERFORMANCE TEST: CPT | Mod: GP | Performed by: PHYSICAL THERAPIST

## 2018-01-15 PROCEDURE — 25800025 ZZH RX 258: Performed by: PHYSICIAN ASSISTANT

## 2018-01-15 PROCEDURE — 36592 COLLECT BLOOD FROM PICC: CPT | Performed by: NURSE PRACTITIONER

## 2018-01-15 PROCEDURE — 25000132 ZZH RX MED GY IP 250 OP 250 PS 637: Performed by: INTERNAL MEDICINE

## 2018-01-15 PROCEDURE — 97116 GAIT TRAINING THERAPY: CPT | Mod: GP | Performed by: PHYSICAL THERAPIST

## 2018-01-15 PROCEDURE — 12000001 ZZH R&B MED SURG/OB UMMC

## 2018-01-15 PROCEDURE — 25000128 H RX IP 250 OP 636: Performed by: NURSE PRACTITIONER

## 2018-01-15 RX ORDER — LANOLIN ALCOHOL/MO/W.PET/CERES
3 CREAM (GRAM) TOPICAL AT BEDTIME
Status: DISCONTINUED | OUTPATIENT
Start: 2018-01-15 | End: 2018-01-17 | Stop reason: HOSPADM

## 2018-01-15 RX ORDER — ZOLPIDEM TARTRATE 5 MG/1
5 TABLET ORAL
Status: DISCONTINUED | OUTPATIENT
Start: 2018-01-15 | End: 2018-01-17 | Stop reason: HOSPADM

## 2018-01-15 RX ORDER — OXYMETAZOLINE HYDROCHLORIDE 0.05 G/100ML
2 SPRAY NASAL 2 TIMES DAILY
Status: ACTIVE | OUTPATIENT
Start: 2018-01-15 | End: 2018-01-16

## 2018-01-15 RX ORDER — VALACYCLOVIR HYDROCHLORIDE 500 MG/1
1000 TABLET, FILM COATED ORAL DAILY
Status: DISCONTINUED | OUTPATIENT
Start: 2018-01-15 | End: 2018-01-17 | Stop reason: HOSPADM

## 2018-01-15 RX ORDER — METOPROLOL TARTRATE 25 MG/1
25 TABLET, FILM COATED ORAL 2 TIMES DAILY
Status: DISCONTINUED | OUTPATIENT
Start: 2018-01-15 | End: 2018-01-17 | Stop reason: HOSPADM

## 2018-01-15 RX ADMIN — CLOTRIMAZOLE 1 TROCHE: 10 LOZENGE ORAL at 20:38

## 2018-01-15 RX ADMIN — DILTIAZEM HYDROCHLORIDE 60 MG: 60 TABLET, FILM COATED ORAL at 09:06

## 2018-01-15 RX ADMIN — OXYCODONE HYDROCHLORIDE 10 MG: 5 TABLET ORAL at 11:22

## 2018-01-15 RX ADMIN — METOPROLOL TARTRATE 12.5 MG: 25 TABLET, FILM COATED ORAL at 09:06

## 2018-01-15 RX ADMIN — CLOTRIMAZOLE 1 TROCHE: 10 LOZENGE ORAL at 15:35

## 2018-01-15 RX ADMIN — PANTOPRAZOLE SODIUM 40 MG: 40 TABLET, DELAYED RELEASE ORAL at 09:06

## 2018-01-15 RX ADMIN — OXYMETAZOLINE HYDROCHLORIDE 2 SPRAY: 5 SPRAY NASAL at 09:06

## 2018-01-15 RX ADMIN — CITALOPRAM HYDROBROMIDE 40 MG: 20 TABLET ORAL at 09:06

## 2018-01-15 RX ADMIN — DEXTROSE AND SODIUM CHLORIDE: 5; 450 INJECTION, SOLUTION INTRAVENOUS at 05:01

## 2018-01-15 RX ADMIN — CLOTRIMAZOLE 1 TROCHE: 10 LOZENGE ORAL at 09:07

## 2018-01-15 RX ADMIN — SODIUM CHLORIDE, PRESERVATIVE FREE 5 ML: 5 INJECTION INTRAVENOUS at 09:50

## 2018-01-15 RX ADMIN — DILTIAZEM HYDROCHLORIDE 60 MG: 60 TABLET, FILM COATED ORAL at 20:38

## 2018-01-15 RX ADMIN — METOPROLOL TARTRATE 25 MG: 25 TABLET ORAL at 20:38

## 2018-01-15 RX ADMIN — VALACYCLOVIR HYDROCHLORIDE 1000 MG: 500 TABLET, FILM COATED ORAL at 15:35

## 2018-01-15 ASSESSMENT — PAIN DESCRIPTION - DESCRIPTORS
DESCRIPTORS: ACHING;SORE
DESCRIPTORS: ACHING

## 2018-01-15 NOTE — PLAN OF CARE
Problem: Renal Failure/Kidney Injury, Acute (Adult)  Goal: Signs and Symptoms of Listed Potential Problems Will be Absent, Minimized or Managed (Renal Failure/Kidney Injury, Acute)  Signs and symptoms of listed potential problems will be absent, minimized or managed by discharge/transition of care (reference Renal Failure/Kidney Injury, Acute (Adult) CPG).     11p-7a: Afebrile. /95 this morning. No prn hydralazine needed. Pt slept well all night after receiving prn ambien @ . Set off bed alarm x 1 because he had to urinate. No complaints of pain.     Addendum @ 0701: Pt has been very restless the past 1-2 hours. Setting the bed alarm off frequently. Appears disoriented. Had night gown half off this morning. Less talkative than normal.

## 2018-01-15 NOTE — PLAN OF CARE
Problem: Patient Care Overview  Goal: Plan of Care/Patient Progress Review    OT-7d: Administered MOCA cognitive screen with pt scoring 13/30, indicating moderate to significant impairments. Increased impairments from last screen. Pt very distractible, groggy, with poor attention, regularly losing track of task at hand, with occasional illogical statements. Pt ambulating hallway with CGA and FWW, although regularly running into objects with walker. Pt increased safety risk for home d/c at this time.    Discharge Planner OT   Patient plan for discharge: home vs rehab  Current status: see above  Barriers to return to prior living situation: decreased cognition and home safety, decreased ADL independence and activity tolerance  Recommendations for discharge: TCU vs home with 24 hour assist and home therapies.   Rationale for recommendations: Pt with worsened cognitive impairments this session, would need very close supervision at home and is increased safety risk. May be able to d/c to home with 24 hr assist with cleared cognition, but rec TCU at this time to increase home safety, functional cognition, and ADL independence.        Entered by: Geoffrey Ross 01/15/2018 3:44 PM

## 2018-01-15 NOTE — PROGRESS NOTES
[facility], [place]  Hematology / Oncology Progress Note     Assessment & Plan   Jeffrey Real is a 53 y/o M with complex PMH including refractory IgA Multiple Myeloma s/p autologous transplant 08/2014, most recently on Daralex/Pom/Decadron (started 11/17), transferred from OSH d/t CHRISTIANE on CKD, persistent sinusitis, HCAP, concern for sepsis, & altered mental status.      # Delirium vs encephalopathy. Felt to be r/t CHRISTIANE and ongoing prolonged hospitalizations. Also consider acyclovir was given for PPX (VZV r/t chemo) while Cr was high.  - Head CT w/o contrast shows no evidence of acute intracranial pathology but + sinusitis & mild chronic small vessel ischemic disease.  - Held PTA oxycodone, dilaudid, ativan & gabapentin d/t potential sedating effects/CHRISTIANE, no withdrawal symptoms. Given reports of pain, added back oxycodone 5-10mg every 4h as needed.  - Given continued improvement, OK to d/c video patient monitoring.  - D/c'd PRN Ativan. QTc 456, using Haldol cautiously (0.5-1mg every 6 hours PRN).   - SLUMs score 20/30 per OT (abnormal) - repeat MMSE ordered for 1/14 (I don't see any OT notes since 1/11).  - Neurology consulted, appreciate their input and recommendations. Video EEG obtained to r/o subclinical seizures. Difficult study given agitation, but indicates mild-to-moderate encephalopathy with no evidence of seizures seen. Last note 1/10 says (neurology will follow peripherally with further rec's to follow if patient should worsen while improving renal function).  - Hyperammonemic on admission, with normal liver function, so not routinely trended thereafter (70-->92-->95). Level re-checked on 1/11, slightly elevated at 65.  - Surveillance blood cultures drawn, urine culture recollected (negative when done on 1/2).   - Unable to obtain brain MRI secondary to CHRISTIANE.  - Continue to hold acyclovir in the event it is exacerbating/contributing to AMS given impaired renal function.  Considering other agent for VZV ppx will discuss with pharmacy.       # High Risk/Resistant Multiple Myeloma IgA kappa. S/P autologous peripheral blood stem cell transplant in 08/2014.   # Multiple lytic lesions 2/2 MM (ribs, T7, T9, T11, T12) s/p XRT to thoracolumbar spine.  # Fracture L ischium.  # Hypogammaglobulinemia s/p IVIG replacement.  S/P multiple lines of therapy including auto tx (RVD, auto+melphalan, Velcade Maintenance, VDT-PACE, followed by PCD) complex cytogenetics (17 p deletion, p53 deletion) most recently on pomalidomide/carfilzomib & dex 20+ cycles (started 02/2017) with progression in November 2017. Most recently switched to daratumumab/pomalidomide/dex.  - Plan was to try to get M-spike < 1 gram% then pursue Allogeneic transplant (saw Dr. Torre in clinic 11/27/17). Has brother as haplo-identical donor.  - 12/26/17 found to have pathologic fractures of T2 spinous process & bilateral T1 transverse process. Destructive lytic lesion involving left scapula (partially imaged).   - S/p dex and daratumumab on 1/6/18, holding Pomalyst. Per wife, this was his 3rd dose of daratumumab since initiating in November 2017. Myeloma labs from this admission compared to prior labs from Nov 2017 at OSH, ? Slightly worse.  - Dex and daratumumab per protocol given on 1/13.      # Pancytopenia 2/2 to MM & related therapy. Recommended dose decrease with subsequent cycles.  - Keep HgB > 8, PLT > 10.       # Acute Kidney Injury/Failure 2/2 to ATN. Cr 4.5 at OSH (up from baseline 0.8-1.1). Likely r/t contrast (got facial bone contrast CT on 12/27 at OSH, + underlying MM) vs. worsening MM vs. Sepsis/infection. No evidence of hypotension in OSH records. Unable to see if he was anuric at OSH.  # Hyperkalemia --> RESOLVED, now HYPOkalemic  # Hyperphosphatemia --> improving  # Hyperuricemia --> improving  # Chronic kidney disease 2/2 to MM.  # Hx. CHRISTIANE requiring HD from sepsis.  - Avoid nephrotoxins, contrast, renally  dose medications.  - Nephrology consulted, appreciate their input and assistance.  - Stopping IVF today in anticipation of d/c.   - BMP daily. Cr improved to 2.26 today. K better at 3.6.      # Hypercalcemia. Last dose of Zometa was 12/7/17, on monthly schedule.  - Due for Zometa but holding given CHRISTIANE. Ionized calcium normalized, 5.0 on 1/9. Continue to follow. No plans to give Zometa or pamidronate currently (may consider giving pamidronate prior to discharge).    #Hypoglycemia.  -Noted intermittently. Reviewed records from OSH through [facility], appears to have occurred PTA as well. ? Due to CHRISTIANE. Continue to monitor, hypoglycemia protocol in place.  - Consider endocrinology consult.    #Prolonged QTc  -Level on admission at 471. Repeat EKG 1/9 is 455. D/c'd Zofran, using Haldol with caution as above. Past EKGs show interval normal 8/2017, first prolonged on 12/25.      # HCAP & Sinusitis.   # Fever without neutropenia.  # RSV  # Hypogammaglobulinemia.  Streaky R lung base opacity. S/P multiple hospitalizations. Most recently 12/11-12/21, 12/25-12/30, & now 1/1- current (transfer from OSH). CT sinus + for sinusitis. Head CT 1/2/18 showing persistent sinusitis.  - Urine & blood CX NGTD from OSH. Unable to collect sputum culture.   - On Zosyn 1/2-1/4. Abx stopped given positive RSV, and negative cultures.  - Repeat blood culture & UA/UC ordered (NGTD, continue to monitor).  - ID consulted, appreciate their input. No ribavirin given for RSV d/t CHRISTIANE. Infectious workup otherwise negative.  - S/p sucrose-free IVIG on 1/8.     # ID PPX. S/p Pentamidine 1/5/17. Acyclovir on hold per above (per guidelines, dose should be reduced from 400mg BID to 200mg BID if CrCl <10, but holding at this time to see if delirium improves). Discussing alternative agent with pharmacy.      # GERD, hx. Candida esophagitis.  - Protonix 40mg daily.      # Hx. Vtach s/p ICD placement.  # Hx. HTN.  - Diltiazem 60 mg BID initially held due to  need for close monitoring of BPs for possible sepsis. Restarted 1/5/17, BPs stable, now hypertensive.   - Re-started metoprolol 1/13 at 12.5mg BID, improved, will dose increase to 25 mg BID (1/15/18).    # Chronic pain 2/2 to MM. Multiple fractures/lytic lesions.  Held PTA meds d/t encephalopathy (OxyContin, Dilaudid).   - Very little pain med needs throughout admission. OK for PRN oxycodone per above.      # Peripheral neuropathy 2/2 to MM therapy. Holding PTA gabapentin.  - Continue to hold at this time.    # Major depression. Continue PTA Celexa.    # Weakness/deconditioning 2/2 to multiple hospitalizations, MM/therapy, & chronic disease.  # Chronic fatigue.  - PT/OT consult.   - Considering d/c to home tomorrow with OP PT in clinic per wife.     # Diarrhea  -C.diff negative.    # Mild-Moderate Malnutrition 2/2 to chronic disease/infections, multiple hospitalizations.  - Regular diet, encourage snacks/supplements.  - Juan Jose counts initiated (1/5).     FEN:   - d/c IVF.  - Follow lytes closely, replace PRN.  - Regular diet as tolerated.     PPX (DVT/GI): protonix daily, ambulate.   LINES/DRAINS: Port.  CONSULTS: Nephrology, ENT, OT, PT, ID.   CODE: Full.  DISPO: Hopefully d/c to home tomorrow, arranging F/U. Would need daratumumab next Monday.    Patient and plan of care discussed with staff attending, Dr. Guevara.    Maricel Tenoriobrice [facility]  Hematology/Oncology  [phone]    Interval History  Met with David this AM. He was able to tell me his name, location, date, time. He was able to tell me his daughter had come & gone from her vacation & reports he hasn't spoken with his wife yet this AM. Is feeling fatigued. Denies N/V. Had about 50% of his pancake/sausage breakfast. Denies fever/chills. Would really like to go home if able. Per nursing felt he was more disoriented this AM & questioning the ambien. I scheduled melatonin instead & left ambien PRN. Patient feels he slept well & was oriented on my exam this AM. I  increased metoprolol r/t elevated BP & stable HR. D/C'ed IVF, and encouraged PO intake.     Physical Exam   Temp: 95.7  F (35.4  C) Temp src: Oral BP: (!) 153/101 Pulse: 107 Heart Rate: 96 Resp: 16 SpO2: 99 % O2 Device: None (Room air)    Vitals:    01/13/18 0900 01/14/18 0700 01/15/18 0904   Weight: 75.8 kg (167 lb) 75.8 kg (167 lb 1.6 oz) 75.4 kg (166 lb 3.2 oz)     Vital Signs with Ranges  Temp:  [95.7  F (35.4  C)-97.4  F (36.3  C)] 95.7  F (35.4  C)  Pulse:  [107] 107  Heart Rate:  [87-97] 96  Resp:  [16] 16  BP: (136-156)/() 153/101  SpO2:  [95 %-99 %] 99 %  I/O last 3 completed shifts:  In: 2800 [P.O.:480; I.V.:2320]  Out: 1650 [Urine:1650]    Constitutional: Seen lying in bed, alert, cooperative, interactive. He's lying on his side with half eaten breakfast in front of him. Denies N/V.   ENT: Mucous membranes pink and moist, no lesions, no thrush.  Respiratory: No increased work of breathing, good air exchange, lungs clear.   Cardiovascular: Regular rate and rhythm. No murmur. No edema.   Abdominal: + bowel sounds, soft, non-tender, non-distended.  Musculoskeletal: 5+ strength, moving all extremities equal.   Neuropsychiatric: Alert, awake, oriented x 3. No focal neurologic deficits. PERRLA.  SKIN: skin is warm & dry, free of lesions/rash.    Medications     - MEDICATION INSTRUCTIONS -         metoprolol tartrate  25 mg Oral BID     oxymetazoline  2 spray Both Nostrils BID     melatonin  3 mg Oral At Bedtime     clotrimazole  1 Malachi Buccal 4x Daily     pantoprazole  40 mg Oral QAM AC     diltiazem  60 mg Oral BID     sodium chloride (PF)  20 mL Intracatheter Q8H     heparin lock flush  5-10 mL Intracatheter Q24H     heparin  5 mL Intracatheter Q28 Days     citalopram (celeXA) tablet 40 mg  40 mg Oral Daily       Data   Results for orders placed or performed during the hospital encounter of 01/02/18 (from the past 24 hour(s))   CBC with platelets differential   Result Value Ref Range    WBC 1.9 (L)  4.0 - 11.0 10e9/L    RBC Count 2.98 (L) 4.4 - 5.9 10e12/L    Hemoglobin 8.7 (L) 13.3 - 17.7 g/dL    Hematocrit 28.4 (L) 40.0 - 53.0 %    MCV 95 78 - 100 fl    MCH 29.2 26.5 - 33.0 pg    MCHC 30.6 (L) 31.5 - 36.5 g/dL    RDW 17.2 (H) 10.0 - 15.0 %    Platelet Count 40 (LL) 150 - 450 10e9/L    Diff Method Manual Differential     % Neutrophils 77.0 %    % Lymphocytes 6.0 %    % Monocytes 10.0 %    % Eosinophils 0.0 %    % Basophils 0.0 %    % Metamyelocytes 2.0 %    % Myelocytes 3.0 %    % Promyelocytes 2.0 %    Absolute Neutrophil 1.5 (L) 1.6 - 8.3 10e9/L    Absolute Lymphocytes 0.1 (L) 0.8 - 5.3 10e9/L    Absolute Monocytes 0.2 0.0 - 1.3 10e9/L    Absolute Eosinophils 0.0 0.0 - 0.7 10e9/L    Absolute Basophils 0.0 0.0 - 0.2 10e9/L    Absolute Metamyelocytes 0.0 0 10e9/L    Absolute Myelocytes 0.1 (H) 0 10e9/L    Absolute Promyeloctyes 0.0 0 10e9/L    Anisocytosis Slight     Rouleaux Increased    Basic metabolic panel   Result Value Ref Range    Sodium 137 133 - 144 mmol/L    Potassium 3.6 3.4 - 5.3 mmol/L    Chloride 103 94 - 109 mmol/L    Carbon Dioxide 22 20 - 32 mmol/L    Anion Gap 12 3 - 14 mmol/L    Glucose 87 70 - 99 mg/dL    Urea Nitrogen 18 7 - 30 mg/dL    Creatinine 2.26 (H) 0.66 - 1.25 mg/dL    GFR Estimate 31 (L) >60 mL/min/1.7m2    GFR Estimate If Black 37 (L) >60 mL/min/1.7m2    Calcium 10.6 (H) 8.5 - 10.1 mg/dL   Uric acid   Result Value Ref Range    Uric Acid 6.2 3.5 - 7.2 mg/dL   Magnesium   Result Value Ref Range    Magnesium 1.9 1.6 - 2.3 mg/dL   Phosphorus   Result Value Ref Range    Phosphorus 4.6 (H) 2.5 - 4.5 mg/dL       Staff Addendum: Patient was seen and examined by me. All labs, VS and pertinent images were reviewed with the team on rounds. Plans for care were mutually developed and outlined above with my direct input.    Interval Events/ROS: C/o back pain and achiness. Not sure if he slept but he doesn't remember anything after the game and RN notes good rest overnight. Interactive and  remains oriented ot the date and location. No epistaxis reported. Rest as above.     Exam: AAO/NAD, cooperative with exam. OP dry. Lungs were clear, HRRR, Abd soft/NT/ND, no HSM or masses, No LE edema, Nro grossly non-focal, Skin exam was without rash.    A/P: 51yo M with IgA MM, relapsed after auto in 2014, here with new onset CHRISTIANE and waxing/waning MS.  - Monitor given that he is better and probably able to d/c hopefully tomorrow.   - No issues with Daratumumab or the steroids. Likely change in MS was due to ACV in the setting of renal dysfunction vs. just the renal dysfunction which is mildly better today.    - Will need to work out other Zoster prophy if that is felt to be the case.    - Consider low-dose B-blocker for his HTN. May go up on dose today.   - Rest as above; Home it seems vs. TCU.   - HL IVF.     Tony Guevara, DO

## 2018-01-15 NOTE — PROGRESS NOTES
Care Coordinator Progress Note     Admission Date/Time:  1/2/2018  Attending MD:  Flaca Guillen MD  Hematologist: Dr. Torre/[facility]     Data  Chart reviewed, discussed with interdisciplinary team.   Patient was admitted for: Acute Kidney Injury  Patient has a history of Multiple Myeloma    Concerns with insurance coverage for discharge needs: None.  Current Living Situation: Patient lives with spouse.  Support System: Supportive and Involved  Services Involved: TBD  Transportation: Family or Friend will provide  Barriers to Discharge: Medical Stability    1/4  Coordination of Care and Referrals: Current recommendation is TCU; SW aware. Writer will continue to follow and assist as needed.    1/12 Per HERMILO Yousif, patient may be stable to discharge Sunday. Therapies recommend TCU vs home with 24 hour assist. SW met with patient; please see her note. Patient's preference is to discharge home but is agreeable to TCU if wife feels that would be best. Writer spoke to patient's wife, Shasta. Per Shasta, if therapies continue to feel he would be safe discharging home with assist, she is in agreement with his discharging home. Per Shasta, she and their daughter will be able to provide 24 hour assist. When questioned if home care would be beneficial, Shasta thought a resumption of his outpatient therapies would be more helpful; AVS updated to resume OP PT/OT. The patient has had home care previously and it was not helpful, per Shasta. Shasta will call the clinic to resume services upon discharge. Shasta would like the referrals made to TCUs in case therapy recommendations change.     1/15 Per Dr. Guevara, patient may be stable to discharge tomorrow. Request sent to [facility] for follow up.     Assessment  RNCC participated in bedside rounds. Introduced the role of RNCC to patient and present family members.     Plan  Anticipated Discharge Date:  1/6/2018  Anticipated Discharge Plan:  TCU with clinic follow  up    Chelsea Hughes, RNCC  Phone [phone]  Pager [phone]

## 2018-01-15 NOTE — PLAN OF CARE
Problem: Patient Care Overview  Goal: Plan of Care/Patient Progress Review  Discharge Planner PT   Patient plan for discharge: Home vs rehab  Current status: Pt tx sit<>stand with SBA and amb ~350 ft with FWW and CGA progressing to SBA. Pt required intermittent VCs for path negotiation during amb; demonstrates significantly slowed poncho. Administered TUG balance assessment to further identify fall risk and supplement disch recommendations. Pt with improved time on assessment, given practice and repeated VCs, however continues to require > 13.5 seconds to complete task. This places pt at increased risk for falls. See below for further detail.  Barriers to return to prior living situation: Gait instability, inconsistent safety awareness/cognition, decreased endurance; Ax1 for all mobility.  Recommendations for discharge: TCU vs home with 24 hr assist and HHPT  Rationale for recommendations: Pt with inconsistent performance during therapy today; intermittently requiring increased verbal cues for safety and attention to task. LE strength and stability continues to progress, would benefit from rehab stay to maximize IND with household mobility prior to disch home.       Entered by: Dulce Sofia 01/15/2018 4:58 PM        Timed Up and Go (TUG): TUG is a test of basic mobility skills. It is used to screen individuals prone to falls.  Gait assistive device used: FWW     Patient score: 20.86 sec (avg of 3 trials)  ?13.5 seconds indicate at risk for falls in older adults according to Chapo-Filemon et al 2000.  ?30 seconds - indicates dependency in most ADL and mobility skills according to Posraffyo & Benny 1991  >11.5 seconds indicate at risk for falls in adults with Parkinson's Disease    Minimal Detectable Change for patients with Alzheimer s = 4.09 sec   Minimal Detectable Change for patients with Parkinson s Disease = 3.5 sec   according to Basil & Shankar Allen 2011    Assessment (rationale for  "performing, application to patient s function & care plan): TUG administered to assess for pt fall risk and supplement disch recommendations. Pt performed 3 trials of TUG, time to complete task improving with each bout and reminders to move \"as quickly as you can, safely\". Pt required 29.16 sec on first trial, 18.49 sec on second trial and 14.92 second on final trial. While time improving, pt continues to fall above 13.5 sec cut-off score, placing pt at increased risk for falls. With waxing and waning cognition, recommend disch to TCU vs 24 hr assist + FWW with all mobility at home. Pt educated on results of assessment and therapist recommendations.     Minutes billed as physical performance test: 8          "

## 2018-01-15 NOTE — PLAN OF CARE
"Problem: Patient Care Overview  Goal: Plan of Care/Patient Progress Review  Outcome: No Change  7a-3p: Pt blood pressure elevated today, CRIS increased metropolol to 25mg PO twice daily today. AOVSS.      Back pain x 1 this shift, PO PRN 10mg oxycodone given with pt sleeping at reassessment. Encouraged pt to call for staff to help with ambulating and increasing activity to help   With \"back stiffness\".      IVF discontinued today. Pt needed frequent reminders to drink fluids today. Pt was more confused and restless today than he has been for me in past 2 days. Pt received PRN ambien for sleep last night to help which may be contributing to the  increased confusion per CRIS. Per CRIS, try melatonin tonight for sleep instead of ambien.  I will pass this on to the oncoming RN at 1900 during bedside report.     Appetite fair-poor today. Pt reported ' I am just not hungry\". I encouraged pt to try to eat small amounts more frequently and to finish scheduled supplement ensure drink.      OT saw pt today. Pt scored 13/30 on slums. Pt unsteady on feet, impulsive and required both bed and chair alarm on all shift. Frequent redirection and reorientation needed to remind pt to use call light for assistance.       No nose bleeds this shift.White patches/areas in mouth. New order for Valtrex PO given this shift.  Pt showered independently with staff standing outside shower today. Will continue with current POC per MD orders.     3p-7p: remainder of shift was uneventful. Pt rested in bed this afternoon. Sat up in chair x 2. Pt still had intermittent confusion and restlessness but behavior appropriate and was easily redirected.  "

## 2018-01-15 NOTE — PLAN OF CARE
Problem: Renal Failure/Kidney Injury, Acute (Adult)  Goal: Signs and Symptoms of Listed Potential Problems Will be Absent, Minimized or Managed (Renal Failure/Kidney Injury, Acute)  Signs and symptoms of listed potential problems will be absent, minimized or managed by discharge/transition of care (reference Renal Failure/Kidney Injury, Acute (Adult) CPG).     7p-11p: VSS. Afebrile. /97. No prn hydralazine needed. Complained of lower back pain. Given po oxy 5 mg x 1 and a warm pack with relief. Given prn ambien @ HS as patient states he has had trouble sleeping since being here in the hospital. Bed alarm on for safety. Continues on scheduled afrin spray. No bleeding noted from nares. Has been alert and oriented. Behavior appropriate.

## 2018-01-16 ENCOUNTER — APPOINTMENT (OUTPATIENT)
Dept: OCCUPATIONAL THERAPY | Facility: CLINIC | Age: 53
DRG: 871 | End: 2018-01-16
Attending: INTERNAL MEDICINE
Payer: COMMERCIAL

## 2018-01-16 LAB
ANION GAP SERPL CALCULATED.3IONS-SCNC: 14 MMOL/L (ref 3–14)
BACTERIA SPEC CULT: NO GROWTH
BACTERIA SPEC CULT: NO GROWTH
BASOPHILS # BLD AUTO: 0 10E9/L (ref 0–0.2)
BASOPHILS NFR BLD AUTO: 1 %
BUN SERPL-MCNC: 22 MG/DL (ref 7–30)
CALCIUM SERPL-MCNC: 11.1 MG/DL (ref 8.5–10.1)
CHLORIDE SERPL-SCNC: 99 MMOL/L (ref 94–109)
CO2 SERPL-SCNC: 22 MMOL/L (ref 20–32)
CREAT SERPL-MCNC: 2.29 MG/DL (ref 0.66–1.25)
DIFFERENTIAL METHOD BLD: ABNORMAL
EOSINOPHIL # BLD AUTO: 0 10E9/L (ref 0–0.7)
EOSINOPHIL NFR BLD AUTO: 0 %
ERYTHROCYTE [DISTWIDTH] IN BLOOD BY AUTOMATED COUNT: 17.2 % (ref 10–15)
GFR SERPL CREATININE-BSD FRML MDRD: 30 ML/MIN/1.7M2
GLUCOSE BLDC GLUCOMTR-MCNC: 108 MG/DL (ref 70–99)
GLUCOSE BLDC GLUCOMTR-MCNC: 117 MG/DL (ref 70–99)
GLUCOSE BLDC GLUCOMTR-MCNC: 117 MG/DL (ref 70–99)
GLUCOSE BLDC GLUCOMTR-MCNC: 126 MG/DL (ref 70–99)
GLUCOSE BLDC GLUCOMTR-MCNC: 152 MG/DL (ref 70–99)
GLUCOSE BLDC GLUCOMTR-MCNC: 198 MG/DL (ref 70–99)
GLUCOSE BLDC GLUCOMTR-MCNC: 58 MG/DL (ref 70–99)
GLUCOSE BLDC GLUCOMTR-MCNC: 64 MG/DL (ref 70–99)
GLUCOSE BLDC GLUCOMTR-MCNC: 68 MG/DL (ref 70–99)
GLUCOSE SERPL-MCNC: 98 MG/DL (ref 70–99)
HCT VFR BLD AUTO: 29.5 % (ref 40–53)
HGB BLD-MCNC: 9.5 G/DL (ref 13.3–17.7)
LYMPHOCYTES # BLD AUTO: 0.1 10E9/L (ref 0.8–5.3)
LYMPHOCYTES NFR BLD AUTO: 8 %
MAGNESIUM SERPL-MCNC: 1.9 MG/DL (ref 1.6–2.3)
MCH RBC QN AUTO: 30.3 PG (ref 26.5–33)
MCHC RBC AUTO-ENTMCNC: 32.2 G/DL (ref 31.5–36.5)
MCV RBC AUTO: 94 FL (ref 78–100)
METAMYELOCYTES # BLD: 0 10E9/L
METAMYELOCYTES NFR BLD MANUAL: 1 %
MONOCYTES # BLD AUTO: 0.2 10E9/L (ref 0–1.3)
MONOCYTES NFR BLD AUTO: 9 %
MYELOCYTES # BLD: 0.1 10E9/L
MYELOCYTES NFR BLD MANUAL: 3 %
NEUTROPHILS # BLD AUTO: 1.4 10E9/L (ref 1.6–8.3)
NEUTROPHILS NFR BLD AUTO: 78 %
NRBC # BLD AUTO: 0 10*3/UL
NRBC BLD AUTO-RTO: 2 /100
PHOSPHATE SERPL-MCNC: 5.2 MG/DL (ref 2.5–4.5)
PLATELET # BLD AUTO: 43 10E9/L (ref 150–450)
POTASSIUM SERPL-SCNC: 3.3 MMOL/L (ref 3.4–5.3)
RBC # BLD AUTO: 3.14 10E12/L (ref 4.4–5.9)
ROULEAUX BLD QL SMEAR: ABNORMAL
SODIUM SERPL-SCNC: 135 MMOL/L (ref 133–144)
SPECIMEN SOURCE: NORMAL
SPECIMEN SOURCE: NORMAL
URATE SERPL-MCNC: 7 MG/DL (ref 3.5–7.2)
WBC # BLD AUTO: 1.8 10E9/L (ref 4–11)

## 2018-01-16 PROCEDURE — 25000128 H RX IP 250 OP 636: Performed by: NURSE PRACTITIONER

## 2018-01-16 PROCEDURE — 36592 COLLECT BLOOD FROM PICC: CPT | Performed by: NURSE PRACTITIONER

## 2018-01-16 PROCEDURE — 00000146 ZZHCL STATISTIC GLUCOSE BY METER IP

## 2018-01-16 PROCEDURE — 80048 BASIC METABOLIC PNL TOTAL CA: CPT | Performed by: NURSE PRACTITIONER

## 2018-01-16 PROCEDURE — 84550 ASSAY OF BLOOD/URIC ACID: CPT | Performed by: NURSE PRACTITIONER

## 2018-01-16 PROCEDURE — 25000132 ZZH RX MED GY IP 250 OP 250 PS 637: Performed by: PHYSICIAN ASSISTANT

## 2018-01-16 PROCEDURE — 25800025 ZZH RX 258: Performed by: INTERNAL MEDICINE

## 2018-01-16 PROCEDURE — 25000125 ZZHC RX 250: Performed by: NURSE PRACTITIONER

## 2018-01-16 PROCEDURE — 97535 SELF CARE MNGMENT TRAINING: CPT | Mod: GO

## 2018-01-16 PROCEDURE — 83735 ASSAY OF MAGNESIUM: CPT | Performed by: NURSE PRACTITIONER

## 2018-01-16 PROCEDURE — 85025 COMPLETE CBC W/AUTO DIFF WBC: CPT | Performed by: NURSE PRACTITIONER

## 2018-01-16 PROCEDURE — 25000132 ZZH RX MED GY IP 250 OP 250 PS 637

## 2018-01-16 PROCEDURE — 84100 ASSAY OF PHOSPHORUS: CPT | Performed by: NURSE PRACTITIONER

## 2018-01-16 PROCEDURE — 87040 BLOOD CULTURE FOR BACTERIA: CPT | Performed by: NURSE PRACTITIONER

## 2018-01-16 PROCEDURE — 25000132 ZZH RX MED GY IP 250 OP 250 PS 637: Performed by: INTERNAL MEDICINE

## 2018-01-16 PROCEDURE — 25000128 H RX IP 250 OP 636: Performed by: INTERNAL MEDICINE

## 2018-01-16 PROCEDURE — 12000001 ZZH R&B MED SURG/OB UMMC

## 2018-01-16 PROCEDURE — 40000133 ZZH STATISTIC OT WARD VISIT

## 2018-01-16 PROCEDURE — 25000132 ZZH RX MED GY IP 250 OP 250 PS 637: Performed by: NURSE PRACTITIONER

## 2018-01-16 RX ORDER — SODIUM CHLORIDE 9 MG/ML
INJECTION, SOLUTION INTRAVENOUS CONTINUOUS
Status: DISCONTINUED | OUTPATIENT
Start: 2018-01-16 | End: 2018-01-16

## 2018-01-16 RX ORDER — ACETAMINOPHEN 325 MG/1
650 TABLET ORAL EVERY 4 HOURS PRN
Qty: 100 TABLET | COMMUNITY
Start: 2018-01-16

## 2018-01-16 RX ORDER — LIDOCAINE 4 G/G
2 PATCH TOPICAL
Status: DISCONTINUED | OUTPATIENT
Start: 2018-01-16 | End: 2018-01-17 | Stop reason: HOSPADM

## 2018-01-16 RX ORDER — OXYCODONE HYDROCHLORIDE 5 MG/1
5-10 TABLET ORAL EVERY 6 HOURS PRN
Qty: 18 TABLET | Refills: 0
Start: 2018-01-16 | End: 2018-01-17

## 2018-01-16 RX ORDER — VALACYCLOVIR HYDROCHLORIDE 1 G/1
1000 TABLET, FILM COATED ORAL DAILY
Qty: 14 TABLET | Refills: 1 | Status: SHIPPED | OUTPATIENT
Start: 2018-01-17

## 2018-01-16 RX ORDER — LANOLIN ALCOHOL/MO/W.PET/CERES
3 CREAM (GRAM) TOPICAL AT BEDTIME
Qty: 10 TABLET | Refills: 0 | Status: SHIPPED | OUTPATIENT
Start: 2018-01-16

## 2018-01-16 RX ORDER — DEXTROSE MONOHYDRATE, SODIUM CHLORIDE, AND POTASSIUM CHLORIDE 50; 1.49; 9 G/1000ML; G/1000ML; G/1000ML
INJECTION, SOLUTION INTRAVENOUS CONTINUOUS
Status: DISCONTINUED | OUTPATIENT
Start: 2018-01-16 | End: 2018-01-17 | Stop reason: HOSPADM

## 2018-01-16 RX ORDER — ACETAMINOPHEN 325 MG/1
650 TABLET ORAL EVERY 4 HOURS PRN
Status: DISCONTINUED | OUTPATIENT
Start: 2018-01-16 | End: 2018-01-17 | Stop reason: HOSPADM

## 2018-01-16 RX ORDER — TRAMADOL HYDROCHLORIDE 50 MG/1
50 TABLET ORAL EVERY 6 HOURS PRN
Status: DISCONTINUED | OUTPATIENT
Start: 2018-01-16 | End: 2018-01-17 | Stop reason: HOSPADM

## 2018-01-16 RX ORDER — AMLODIPINE BESYLATE 5 MG/1
5 TABLET ORAL DAILY
Status: DISCONTINUED | OUTPATIENT
Start: 2018-01-16 | End: 2018-01-17 | Stop reason: HOSPADM

## 2018-01-16 RX ORDER — CITALOPRAM HYDROBROMIDE 40 MG/1
40 TABLET ORAL DAILY
Qty: 60 TABLET | Refills: 0 | Status: SHIPPED | OUTPATIENT
Start: 2018-01-17

## 2018-01-16 RX ORDER — METOPROLOL TARTRATE 25 MG/1
25 TABLET, FILM COATED ORAL 2 TIMES DAILY
Qty: 20 TABLET | Refills: 0 | Status: SHIPPED | OUTPATIENT
Start: 2018-01-16

## 2018-01-16 RX ORDER — AMLODIPINE BESYLATE 5 MG/1
5 TABLET ORAL DAILY
Qty: 30 TABLET | Refills: 0 | Status: SHIPPED | OUTPATIENT
Start: 2018-01-16

## 2018-01-16 RX ORDER — POTASSIUM CHLORIDE 750 MG/1
40 TABLET, EXTENDED RELEASE ORAL ONCE
Status: COMPLETED | OUTPATIENT
Start: 2018-01-16 | End: 2018-01-16

## 2018-01-16 RX ADMIN — SODIUM CHLORIDE, PRESERVATIVE FREE 5 ML: 5 INJECTION INTRAVENOUS at 10:03

## 2018-01-16 RX ADMIN — CITALOPRAM HYDROBROMIDE 40 MG: 20 TABLET ORAL at 08:54

## 2018-01-16 RX ADMIN — DEXTROSE MONOHYDRATE 25 ML: 500 INJECTION PARENTERAL at 21:28

## 2018-01-16 RX ADMIN — DEXTROSE MONOHYDRATE 15 ML: 500 INJECTION PARENTERAL at 13:36

## 2018-01-16 RX ADMIN — AMLODIPINE BESYLATE 5 MG: 5 TABLET ORAL at 12:34

## 2018-01-16 RX ADMIN — PAMIDRONATE DISODIUM 60 MG: 3 INJECTION, SOLUTION INTRAVENOUS at 12:26

## 2018-01-16 RX ADMIN — SODIUM CHLORIDE: 9 INJECTION, SOLUTION INTRAVENOUS at 12:51

## 2018-01-16 RX ADMIN — OXYCODONE HYDROCHLORIDE 5 MG: 5 TABLET ORAL at 10:03

## 2018-01-16 RX ADMIN — DEXTROSE MONOHYDRATE 25 ML: 500 INJECTION PARENTERAL at 19:29

## 2018-01-16 RX ADMIN — CLOTRIMAZOLE 1 TROCHE: 10 LOZENGE ORAL at 17:30

## 2018-01-16 RX ADMIN — LIDOCAINE 2 PATCH: 560 PATCH PERCUTANEOUS; TOPICAL; TRANSDERMAL at 19:45

## 2018-01-16 RX ADMIN — CLOTRIMAZOLE 1 TROCHE: 10 LOZENGE ORAL at 12:26

## 2018-01-16 RX ADMIN — VALACYCLOVIR HYDROCHLORIDE 1000 MG: 500 TABLET, FILM COATED ORAL at 08:53

## 2018-01-16 RX ADMIN — Medication 2 G: at 08:49

## 2018-01-16 RX ADMIN — POTASSIUM CHLORIDE 40 MEQ: 750 TABLET, EXTENDED RELEASE ORAL at 14:25

## 2018-01-16 RX ADMIN — DILTIAZEM HYDROCHLORIDE 60 MG: 60 TABLET, FILM COATED ORAL at 19:47

## 2018-01-16 RX ADMIN — METOPROLOL TARTRATE 25 MG: 25 TABLET ORAL at 08:54

## 2018-01-16 RX ADMIN — METOPROLOL TARTRATE 25 MG: 25 TABLET ORAL at 19:47

## 2018-01-16 RX ADMIN — SODIUM CHLORIDE, PRESERVATIVE FREE 10 ML: 5 INJECTION INTRAVENOUS at 05:24

## 2018-01-16 RX ADMIN — PANTOPRAZOLE SODIUM 40 MG: 40 TABLET, DELAYED RELEASE ORAL at 08:53

## 2018-01-16 RX ADMIN — SODIUM CHLORIDE 500 ML: 9 INJECTION, SOLUTION INTRAVENOUS at 13:55

## 2018-01-16 RX ADMIN — CLOTRIMAZOLE 1 TROCHE: 10 LOZENGE ORAL at 06:13

## 2018-01-16 RX ADMIN — POTASSIUM CHLORIDE, DEXTROSE MONOHYDRATE AND SODIUM CHLORIDE: 150; 5; 900 INJECTION, SOLUTION INTRAVENOUS at 21:37

## 2018-01-16 RX ADMIN — DILTIAZEM HYDROCHLORIDE 60 MG: 60 TABLET, FILM COATED ORAL at 08:54

## 2018-01-16 ASSESSMENT — PAIN DESCRIPTION - DESCRIPTORS: DESCRIPTORS: ACHING

## 2018-01-16 ASSESSMENT — VISUAL ACUITY: OU: NORMAL ACUITY

## 2018-01-16 NOTE — PLAN OF CARE
Problem: Patient Care Overview  Goal: Plan of Care/Patient Progress Review  7D - Cancel, pt declines.  Indicates hospital disch delayed until tomorrow.

## 2018-01-16 NOTE — PLAN OF CARE
Problem: Patient Care Overview  Goal: Plan of Care/Patient Progress Review  OT 7D:  Discharge Planner OT   Patient plan for discharge: Home  Current status: Pt resting; time taken to educate spouse on spinal precautions and ways to grade/modify activity appropriately given pt's cognitive impairments and spouse's 24/7 supervision/assist role. Spouse acknowledging all education and appreciative of the thoroughness in regards to implications of precautions on daily activities. Also educated on AE/DME and where/how to purchase, including toilet safety frame vs commode vs raised toilet seat and reacher/grabber.   Barriers to return to prior living situation: cognitive deficits, spinal precautions, fatigue  Recommendations for discharge: Home with 24/7 assist and outpatient OT/PT   Rationale for recommendations: Given patient's current cognitive impairments, would benefit from assist/supervision for bathing and dressing as well as higher level IADL tasks including meal prep and med management. Outpatient therapies to promote cognitive compensation and functional strength/endurance.       Entered by: Edda Guan 01/16/2018 1:22 PM

## 2018-01-16 NOTE — PLAN OF CARE
Problem: Patient Care Overview  Goal: Plan of Care/Patient Progress Review  Outcome: Improving  VSS. Pt A&O x 4, but forgetful. Denies pain, nausea, HA, chest pain, dizziness or SOB. Up with Ax1 due to impulsivity and unsteady gait.  Pt did not eat dinner (did not even notice the tray was on his table) but had no appetite. Voiding adequately. No BM this shift. No nosebleeds. Sleeping most of the shift. Pt calling intermittently calling appropriately for assistance. Bed alarm on for safety. Possible d/c home today.

## 2018-01-16 NOTE — PROGRESS NOTES
[facility], [place]  Hematology / Oncology Progress Note     Assessment & Plan   Jeffrey Real is a 51 y/o M with complex PMH including refractory IgA Multiple Myeloma s/p autologous transplant 08/2014, most recently on Daralex/Pom/Decadron (started 11/17), transferred from OSH d/t CHRISTIANE on CKD, persistent sinusitis, HCAP, concern for sepsis, & altered mental status.      # Delirium vs encephalopathy. Felt to be r/t CHRISTIANE and ongoing prolonged hospitalizations. Also consider acyclovir was given for PPX (VZV r/t chemo) while Cr was high.  - Head CT w/o contrast shows no evidence of acute intracranial pathology but + sinusitis & mild chronic small vessel ischemic disease.  - Held PTA oxycodone, dilaudid, ativan & gabapentin d/t potential sedating effects/CHRISTIANE, no withdrawal symptoms. Given reports of pain, added back oxycodone 5-10mg every 4h as needed.  - Given continued improvement, OK to d/c video patient monitoring.  - D/c'd PRN Ativan. QTc 456, using Haldol cautiously (0.5-1mg every 6 hours PRN).   - SLUMs score 20/30 per OT (abnormal).  - Neurology consulted, appreciate their input and recommendations. Video EEG obtained to r/o subclinical seizures. Difficult study given agitation, but indicates mild-to-moderate encephalopathy with no evidence of seizures seen. Last note 1/10 says (neurology will follow peripherally with further rec's to follow if patient should worsen while improving renal function).  - Hyperammonemic on admission, with normal liver function, so not routinely trended thereafter (70-->92-->95). Level re-checked on 1/11, slightly elevated at 65.  - Surveillance blood cultures & urine culture drawn (1/10), repeated again today.   - Unable to obtain brain MRI secondary to CHRISTIANE.  - Continue to hold acyclovir in the event it is exacerbating/contributing to AMS now on Valtrex.   - Please don't give oxycodone during the day, if you haven't already tried tylenol, heat (aqua K),  cold (ice pack), biofreeze, lidocaine patch, position changes (up to chair). Patient very sensitive to medications.      # High Risk/Resistant Multiple Myeloma IgA kappa. S/P autologous peripheral blood stem cell transplant in 08/2014.   # Multiple lytic lesions 2/2 MM (ribs, T7, T9, T11, T12) s/p XRT to thoracolumbar spine.  # Fracture L ischium.  # Hypogammaglobulinemia s/p IVIG replacement.  S/P multiple lines of therapy including auto tx (RVD, auto+melphalan, Velcade Maintenance, VDT-PACE, followed by PCD) complex cytogenetics (17 p deletion, p53 deletion) most recently on pomalidomide/carfilzomib & dex 20+ cycles (started 02/2017) with progression in November 2017. Most recently switched to daratumumab/pomalidomide/dex.  - Plan was to try to get M-spike < 1 gram% then pursue Allogeneic transplant (saw Dr. Torre in clinic 11/27/17). Has brother as haplo-identical donor.  - 12/26/17 found to have pathologic fractures of T2 spinous process & bilateral T1 transverse process. Destructive lytic lesion involving left scapula (partially imaged).   - S/p dex and daratumumab on 1/6/18, holding Pomalyst. Per wife, this was his 3rd dose of daratumumab since initiating in November 2017. Myeloma labs from this admission compared to prior labs from Nov 2017 at OSH, ? Slightly worse.  - Dex and daratumumab per protocol given on 1/13.  - Labs & CRIS later this week, Daratumumab next Monday, then Yelitza SANDY 1/30.       # Pancytopenia 2/2 to MM & related therapy. Recommended dose decrease with subsequent cycles.  - Keep HgB > 8, PLT > 10.   - HgB 9.5, PLT 43.       # Acute Kidney Injury/Failure 2/2 to ATN. Cr 4.5 at OSH (up from baseline 0.8-1.1). Likely r/t contrast (got facial bone contrast CT on 12/27 at OSH, + underlying MM) vs. worsening MM vs. Sepsis/infection. No evidence of hypotension in OSH records. Unable to see if he was anuric at OSH.  # Hyperkalemia --> RESOLVED, now HYPOkalemic  # Hyperphosphatemia -->  improving  # Hyperuricemia --> improving  # Chronic kidney disease 2/2 to MM.  # Hx. CHRISTIANE requiring HD from sepsis.  - Avoid nephrotoxins, contrast, renally dose medications.  - Nephrology consulted, appreciate their input and assistance.  - BMP daily.   - Cr improved to 2.26/2.29, gave K today r/t 3.3. Ph up slightly 5.2   - Added IVF back, not getting good PO intake      # Hypercalcemia. Last dose of Zometa was 12/7/17, on monthly schedule.  - Due for Zometa but holding given CHRISTIANE. Ionized calcium normalized, 5.0 on 1/9. Continue to follow.   - Gave pamidronate today 60 mg dose.     #Hypoglycemia.  -Noted intermittently. Reviewed records from OSH through [facility], appears to have occurred PTA as well. ? Due to CHRISTIANE. Continue to monitor, hypoglycemia protocol in place.  - Consider endocrinology consult.  - Required D 50 today, needs encouragement to eat.    #Prolonged QTc  -Level on admission at 471. Repeat EKG 1/9 is 455. D/c'd Zofran, using Haldol with caution as above. Past EKGs show interval normal 8/2017, first prolonged on 12/25.      # HCAP & Sinusitis.   # Fever without neutropenia.  # RSV  # Hypogammaglobulinemia.  Streaky R lung base opacity. S/P multiple hospitalizations. Most recently 12/11-12/21, 12/25-12/30, & now 1/1- current (transfer from OSH). CT sinus + for sinusitis. Head CT 1/2/18 showing persistent sinusitis.  - Urine & blood CX NGTD from OSH. Unable to collect sputum culture.   - On Zosyn 1/2-1/4. Abx stopped given positive RSV, and negative cultures.  - Repeat blood culture & UA/UC ordered (NGTD, continue to monitor).  - ID consulted, appreciate their input. No ribavirin given for RSV d/t CHRISTIANE. Infectious workup otherwise negative.  - S/p sucrose-free IVIG on 1/8.     # ID PPX. S/p Pentamidine 1/5/17. On Valtrex for VZV ppx.      # GERD, hx. Candida esophagitis.  - Protonix 40mg daily.      # Hx. Vtach s/p ICD placement.  # Hx. HTN.  - Diltiazem 60 mg BID initially held due to need for  close monitoring of BPs for possible sepsis. Restarted 1/5/17, BPs stable, now hypertensive.   - Re-started metoprolol 1/13 at 12.5mg BID, improved, will dose increase to 25 mg BID (1/15/18).  - Added amlodipine today 5 mg daily (effect on kidneys, although same CCB).     # Chronic pain 2/2 to MM. Multiple fractures/lytic lesions.  Held PTA meds d/t encephalopathy (OxyContin, Dilaudid).   - Very little pain med needs throughout admission. OK for PRN oxycodone per above.      # Peripheral neuropathy 2/2 to MM therapy. Holding PTA gabapentin.  - Continue to hold at this time.    # Major depression. Continue PTA Celexa.    # Weakness/deconditioning 2/2 to multiple hospitalizations, MM/therapy, & chronic disease.  # Chronic fatigue.  - PT/OT consult.   - Considering d/c to home tomorrow with OP PT in clinic per wife.     # Diarrhea  -C.diff negative.    # Mild-Moderate Malnutrition 2/2 to chronic disease/infections, multiple hospitalizations.  - Regular diet, encourage snacks/supplements.  - Juan Jose counts initiated (1/5).     FEN:   - Restarted IVF.  - Follow lytes closely, replace PRN.  - Regular diet as tolerated.     PPX (DVT/GI): protonix daily, ambulate.   LINES/DRAINS: Port.  CONSULTS: Nephrology, ENT, OT, PT, ID.   CODE: Full.  DISPO: Hopefully d/c to home tomorrow, arranging F/U. Would need daratumumab next Monday.    Patient and plan of care discussed with staff attending, Dr. Guevara.    Maricel Kyle [facility]  Hematology/Oncology  [phone]    Interval History  Was doing really well this AM. 830 AM. Was talkative, interactive, oriented. Denied pain, nausea, fever, chills, he just wanted to d/c home. This afternoon he's more tired, fatigued, sweaty, wife says it started around 1030 when she got there. Got oxycodone at 10 am. Restarted fluids. Vitals are stable. Plan to keep him again overnight. Nurse pushed D 50, recheck glucose 120. Sending surveillance blood cultures. No urinary symptoms. Discussed with Ismael.  NTD. Try to stay away from oxycodone during the day.     Physical Exam   Temp: 97.5  F (36.4  C) Temp src: Oral BP: 137/86 Pulse: 102 Heart Rate: 89 Resp: 16 SpO2: 93 % O2 Device: None (Room air)    Vitals:    01/14/18 0700 01/15/18 0904 01/16/18 0707   Weight: 75.8 kg (167 lb 1.6 oz) 75.4 kg (166 lb 3.2 oz) 73.7 kg (162 lb 6.4 oz)     Vital Signs with Ranges  Temp:  [95.6  F (35.3  C)-97.8  F (36.6  C)] 97.5  F (36.4  C)  Pulse:  [102] 102  Heart Rate:  [] 89  Resp:  [16-18] 16  BP: (137-147)/(86-93) 137/86  SpO2:  [93 %-97 %] 93 %  I/O last 3 completed shifts:  In: 560 [P.O.:240; I.V.:320]  Out: 1100 [Urine:1100]    Constitutional: Seen lying in bed, alert, cooperative, interactive.   ENT: Mucous membranes pink and moist, no lesions, no thrush.  Respiratory: No increased work of breathing, good air exchange, lungs clear.   Cardiovascular: Regular rate and rhythm. No murmur. No edema.   Abdominal: + bowel sounds, soft, non-tender, non-distended.  Musculoskeletal: 5+ strength, moving all extremities equal.   Neuropsychiatric: Alert, awake, oriented x 3. No focal neurologic deficits. PERRLA.  SKIN: skin is warm & dry, free of lesions/rash. (was sweaty on second exam).    Medications     NaCl 75 mL/hr at 01/16/18 1251     - MEDICATION INSTRUCTIONS -         pamidronate (AREDIA) 90 mg intermittent infusion  60 mg Intravenous Once     amLODIPine  5 mg Oral Daily     sodium chloride 0.9%  500 mL Intravenous Once     potassium chloride  40 mEq Oral Once     metoprolol tartrate  25 mg Oral BID     oxymetazoline  2 spray Both Nostrils BID     melatonin  3 mg Oral At Bedtime     valACYclovir  1,000 mg Oral Daily     clotrimazole  1 Malachi Buccal 4x Daily     pantoprazole  40 mg Oral QAM AC     diltiazem  60 mg Oral BID     sodium chloride (PF)  20 mL Intracatheter Q8H     heparin lock flush  5-10 mL Intracatheter Q24H     heparin  5 mL Intracatheter Q28 Days     citalopram (celeXA) tablet 40 mg  40 mg Oral Daily        Data   Results for orders placed or performed during the hospital encounter of 01/02/18 (from the past 24 hour(s))   CBC with platelets differential   Result Value Ref Range    WBC 1.8 (L) 4.0 - 11.0 10e9/L    RBC Count 3.14 (L) 4.4 - 5.9 10e12/L    Hemoglobin 9.5 (L) 13.3 - 17.7 g/dL    Hematocrit 29.5 (L) 40.0 - 53.0 %    MCV 94 78 - 100 fl    MCH 30.3 26.5 - 33.0 pg    MCHC 32.2 31.5 - 36.5 g/dL    RDW 17.2 (H) 10.0 - 15.0 %    Platelet Count 43 (LL) 150 - 450 10e9/L    Diff Method Manual Differential     % Neutrophils 78.0 %    % Lymphocytes 8.0 %    % Monocytes 9.0 %    % Eosinophils 0.0 %    % Basophils 1.0 %    % Metamyelocytes 1.0 %    % Myelocytes 3.0 %    Nucleated RBCs 2 (H) 0 /100    Absolute Neutrophil 1.4 (L) 1.6 - 8.3 10e9/L    Absolute Lymphocytes 0.1 (L) 0.8 - 5.3 10e9/L    Absolute Monocytes 0.2 0.0 - 1.3 10e9/L    Absolute Eosinophils 0.0 0.0 - 0.7 10e9/L    Absolute Basophils 0.0 0.0 - 0.2 10e9/L    Absolute Metamyelocytes 0.0 0 10e9/L    Absolute Myelocytes 0.1 (H) 0 10e9/L    Absolute Nucleated RBC 0.0     Rouleaux Increased    Basic metabolic panel   Result Value Ref Range    Sodium 135 133 - 144 mmol/L    Potassium 3.3 (L) 3.4 - 5.3 mmol/L    Chloride 99 94 - 109 mmol/L    Carbon Dioxide 22 20 - 32 mmol/L    Anion Gap 14 3 - 14 mmol/L    Glucose 98 70 - 99 mg/dL    Urea Nitrogen 22 7 - 30 mg/dL    Creatinine 2.29 (H) 0.66 - 1.25 mg/dL    GFR Estimate 30 (L) >60 mL/min/1.7m2    GFR Estimate If Black 36 (L) >60 mL/min/1.7m2    Calcium 11.1 (H) 8.5 - 10.1 mg/dL   Uric acid   Result Value Ref Range    Uric Acid 7.0 3.5 - 7.2 mg/dL   Magnesium   Result Value Ref Range    Magnesium 1.9 1.6 - 2.3 mg/dL   Phosphorus   Result Value Ref Range    Phosphorus 5.2 (H) 2.5 - 4.5 mg/dL       Staff Addendum: Patient was seen and examined by me. All labs, VS and pertinent images were reviewed with the team on rounds. Plans for care were mutually developed and outlined above with my direct  input.    Interval Events/ROS: Very lethargic when I saw him today and diaphoretic/confused.  RN checked glucose after and he was low.  Apparently this AM was perfectly fine and coherent/oriented. No epistaxis reported. Rest as above.     Exam: AAO/NAD, cooperative with exam. OP dry. Lungs were clear, HRRR, Abd soft/NT/ND, no HSM or masses, No LE edema, Nro grossly non-focal, Skin exam was without rash.    A/P: 53yo M with IgA MM, relapsed after auto in 2014, here with new onset CHRISTIANE and waxing/waning MS.  - Monitor given that he is better and probably able to d/c hopefully tomorrow as long as the hypoglycemia improves.    - No issues with Daratumumab or the steroids. Likely change in MS was due to ACV in the setting of renal dysfunction vs. just the renal dysfunction.   - In case inc'd calcium is at play here, will give dose of pamidronate.   - Change anti-HTN to norvasc   - Restart IVF for a bit today.   - Rest as above; Home it seems vs. TCU.     Tony Guevara, DO

## 2018-01-16 NOTE — PLAN OF CARE
Problem: Patient Care Overview  Goal: Plan of Care/Patient Progress Review  Outcome: No Change    VSS. Oxycodone 5 mg for back pain. Norvasc added today. No nosebleeds. Potassium and magnesium replaced. Aredia given for calcium level. Pt became increasingly lethargic as the day progressed. Became diaphoretic this afternoon. Blood sugar checked and level was 68. D50 15mL given; up to 126 after that and apple juice. Following levels 108 and 117. Drank ensure shake and a few bites of cereal when more awake. 500 mL NS bolus given over 2 hours and MIVF started @75 mL/hr. Blood culture collected. Pt more alert once blood sugar corrected. Bed alarm and chair alarm on at all times. Possible discharge tomorrow. Continue with plan of care.     Addendum: New raised red blanchable rash noted on bilateral buttocks. MD notified.

## 2018-01-16 NOTE — PROGRESS NOTES
Care Coordinator Progress Note     Admission Date/Time:  1/2/2018  Attending MD:  Flaca Guillen MD  Hematologist: Dr. Torre/[facility]     Data  Chart reviewed, discussed with interdisciplinary team.   Patient was admitted for: Acute Kidney Injury  Patient has a history of Multiple Myeloma    Concerns with insurance coverage for discharge needs: None.  Current Living Situation: Patient lives with spouse.  Support System: Supportive and Involved  Services Involved: TBD  Transportation: Family or Friend will provide  Barriers to Discharge: Medical Stability    1/4  Coordination of Care and Referrals: Current recommendation is TCU; SW aware. Writer will continue to follow and assist as needed.    1/12 Per HERMILO Yousif, patient may be stable to discharge Sunday. Therapies recommend TCU vs home with 24 hour assist. SW met with patient; please see her note. Patient's preference is to discharge home but is agreeable to TCU if wife feels that would be best. Writer spoke to patient's wife, Shasta. Per Shasta, if therapies continue to feel he would be safe discharging home with assist, she is in agreement with his discharging home. Per Shasta, she and their daughter will be able to provide 24 hour assist. When questioned if home care would be beneficial, Shasta thought a resumption of his outpatient therapies would be more helpful; AVS updated to resume OP PT/OT. The patient has had home care previously and it was not helpful, per Shasta. Shasta will call the clinic to resume services upon discharge. Shasta would like the referrals made to TCUs in case therapy recommendations change.     1/15 Per Dr. Guevara, patient may be stable to discharge tomorrow. Request sent to [facility] for follow up.    1/16 Patient was going to discharge but now on hold due to change in mental status. Writer spoke to patient's wife; she is still in agreement with patient discharging home with 24 hour assist. Writer will continue to  follow.      Plan  Anticipated Discharge Date:  1/17/2018  Anticipated Discharge Plan:  Home with 24 hour assist and clinic follow up    Chelsea Hughes RNCC  Phone [phone]  Pager [phone]

## 2018-01-17 VITALS
DIASTOLIC BLOOD PRESSURE: 77 MMHG | RESPIRATION RATE: 18 BRPM | HEART RATE: 96 BPM | SYSTOLIC BLOOD PRESSURE: 117 MMHG | BODY MASS INDEX: 22.73 KG/M2 | HEIGHT: 70 IN | TEMPERATURE: 96.8 F | WEIGHT: 158.8 LBS | OXYGEN SATURATION: 97 %

## 2018-01-17 LAB
ANION GAP SERPL CALCULATED.3IONS-SCNC: 10 MMOL/L (ref 3–14)
ANISOCYTOSIS BLD QL SMEAR: SLIGHT
BASOPHILS # BLD AUTO: 0 10E9/L (ref 0–0.2)
BASOPHILS NFR BLD AUTO: 0 %
BUN SERPL-MCNC: 18 MG/DL (ref 7–30)
CALCIUM SERPL-MCNC: 10 MG/DL (ref 8.5–10.1)
CHLORIDE SERPL-SCNC: 105 MMOL/L (ref 94–109)
CO2 SERPL-SCNC: 22 MMOL/L (ref 20–32)
CREAT SERPL-MCNC: 2 MG/DL (ref 0.66–1.25)
DIFFERENTIAL METHOD BLD: ABNORMAL
EOSINOPHIL # BLD AUTO: 0 10E9/L (ref 0–0.7)
EOSINOPHIL NFR BLD AUTO: 0 %
ERYTHROCYTE [DISTWIDTH] IN BLOOD BY AUTOMATED COUNT: 17.3 % (ref 10–15)
GFR SERPL CREATININE-BSD FRML MDRD: 35 ML/MIN/1.7M2
GLUCOSE BLDC GLUCOMTR-MCNC: 100 MG/DL (ref 70–99)
GLUCOSE BLDC GLUCOMTR-MCNC: 102 MG/DL (ref 70–99)
GLUCOSE BLDC GLUCOMTR-MCNC: 125 MG/DL (ref 70–99)
GLUCOSE BLDC GLUCOMTR-MCNC: 127 MG/DL (ref 70–99)
GLUCOSE BLDC GLUCOMTR-MCNC: 67 MG/DL (ref 70–99)
GLUCOSE BLDC GLUCOMTR-MCNC: 73 MG/DL (ref 70–99)
GLUCOSE BLDC GLUCOMTR-MCNC: 74 MG/DL (ref 70–99)
GLUCOSE BLDC GLUCOMTR-MCNC: 75 MG/DL (ref 70–99)
GLUCOSE BLDC GLUCOMTR-MCNC: 86 MG/DL (ref 70–99)
GLUCOSE BLDC GLUCOMTR-MCNC: 87 MG/DL (ref 70–99)
GLUCOSE SERPL-MCNC: 123 MG/DL (ref 70–99)
HCT VFR BLD AUTO: 26.4 % (ref 40–53)
HGB BLD-MCNC: 8.5 G/DL (ref 13.3–17.7)
LYMPHOCYTES # BLD AUTO: 0.2 10E9/L (ref 0.8–5.3)
LYMPHOCYTES NFR BLD AUTO: 9.6 %
MAGNESIUM SERPL-MCNC: 2 MG/DL (ref 1.6–2.3)
MCH RBC QN AUTO: 30.5 PG (ref 26.5–33)
MCHC RBC AUTO-ENTMCNC: 32.2 G/DL (ref 31.5–36.5)
MCV RBC AUTO: 95 FL (ref 78–100)
METAMYELOCYTES # BLD: 0 10E9/L
METAMYELOCYTES NFR BLD MANUAL: 0.9 %
MONOCYTES # BLD AUTO: 0.1 10E9/L (ref 0–1.3)
MONOCYTES NFR BLD AUTO: 5.3 %
MYELOCYTES # BLD: 0 10E9/L
MYELOCYTES NFR BLD MANUAL: 1.8 %
NEUTROPHILS # BLD AUTO: 1.6 10E9/L (ref 1.6–8.3)
NEUTROPHILS NFR BLD AUTO: 81.5 %
NRBC # BLD AUTO: 0 10*3/UL
NRBC BLD AUTO-RTO: 2 /100
PHOSPHATE SERPL-MCNC: 4.1 MG/DL (ref 2.5–4.5)
PLATELET # BLD AUTO: 38 10E9/L (ref 150–450)
PLATELET # BLD EST: ABNORMAL 10*3/UL
POTASSIUM SERPL-SCNC: 4.3 MMOL/L (ref 3.4–5.3)
PROMYELOCYTES # BLD MANUAL: 0 10E9/L
PROMYELOCYTES NFR BLD MANUAL: 0.9 %
RBC # BLD AUTO: 2.79 10E12/L (ref 4.4–5.9)
ROULEAUX BLD QL SMEAR: ABNORMAL
SODIUM SERPL-SCNC: 137 MMOL/L (ref 133–144)
URATE SERPL-MCNC: 6.1 MG/DL (ref 3.5–7.2)
WBC # BLD AUTO: 2 10E9/L (ref 4–11)

## 2018-01-17 PROCEDURE — 83735 ASSAY OF MAGNESIUM: CPT | Performed by: NURSE PRACTITIONER

## 2018-01-17 PROCEDURE — 25000132 ZZH RX MED GY IP 250 OP 250 PS 637

## 2018-01-17 PROCEDURE — 36592 COLLECT BLOOD FROM PICC: CPT | Performed by: NURSE PRACTITIONER

## 2018-01-17 PROCEDURE — 00000146 ZZHCL STATISTIC GLUCOSE BY METER IP

## 2018-01-17 PROCEDURE — 84550 ASSAY OF BLOOD/URIC ACID: CPT | Performed by: NURSE PRACTITIONER

## 2018-01-17 PROCEDURE — 85025 COMPLETE CBC W/AUTO DIFF WBC: CPT | Performed by: NURSE PRACTITIONER

## 2018-01-17 PROCEDURE — 25000128 H RX IP 250 OP 636: Performed by: NURSE PRACTITIONER

## 2018-01-17 PROCEDURE — 25000125 ZZHC RX 250: Performed by: NURSE PRACTITIONER

## 2018-01-17 PROCEDURE — 80048 BASIC METABOLIC PNL TOTAL CA: CPT | Performed by: NURSE PRACTITIONER

## 2018-01-17 PROCEDURE — 25000132 ZZH RX MED GY IP 250 OP 250 PS 637: Performed by: NURSE PRACTITIONER

## 2018-01-17 PROCEDURE — 25000132 ZZH RX MED GY IP 250 OP 250 PS 637: Performed by: INTERNAL MEDICINE

## 2018-01-17 PROCEDURE — 84100 ASSAY OF PHOSPHORUS: CPT | Performed by: NURSE PRACTITIONER

## 2018-01-17 RX ORDER — LIDOCAINE 4 G/G
2 PATCH TOPICAL EVERY 24 HOURS
Qty: 30 PATCH | Refills: 0 | Status: SHIPPED | OUTPATIENT
Start: 2018-01-17

## 2018-01-17 RX ORDER — TRAMADOL HYDROCHLORIDE 50 MG/1
50 TABLET ORAL EVERY 6 HOURS PRN
Qty: 20 TABLET | Refills: 0 | Status: SHIPPED | OUTPATIENT
Start: 2018-01-17

## 2018-01-17 RX ADMIN — MELATONIN TAB 3 MG 3 MG: 3 TAB at 00:24

## 2018-01-17 RX ADMIN — METOPROLOL TARTRATE 25 MG: 25 TABLET ORAL at 08:47

## 2018-01-17 RX ADMIN — VALACYCLOVIR HYDROCHLORIDE 1000 MG: 500 TABLET, FILM COATED ORAL at 08:47

## 2018-01-17 RX ADMIN — Medication 2 G: at 08:42

## 2018-01-17 RX ADMIN — CITALOPRAM HYDROBROMIDE 40 MG: 20 TABLET ORAL at 08:46

## 2018-01-17 RX ADMIN — ACETAMINOPHEN 650 MG: 325 TABLET, FILM COATED ORAL at 05:30

## 2018-01-17 RX ADMIN — CLOTRIMAZOLE 1 TROCHE: 10 LOZENGE ORAL at 00:19

## 2018-01-17 RX ADMIN — DILTIAZEM HYDROCHLORIDE 60 MG: 60 TABLET, FILM COATED ORAL at 08:46

## 2018-01-17 RX ADMIN — SODIUM CHLORIDE, PRESERVATIVE FREE 5 ML: 5 INJECTION INTRAVENOUS at 14:46

## 2018-01-17 RX ADMIN — CLOTRIMAZOLE 1 TROCHE: 10 LOZENGE ORAL at 11:44

## 2018-01-17 RX ADMIN — PANTOPRAZOLE SODIUM 40 MG: 40 TABLET, DELAYED RELEASE ORAL at 08:46

## 2018-01-17 RX ADMIN — CLOTRIMAZOLE 1 TROCHE: 10 LOZENGE ORAL at 05:24

## 2018-01-17 RX ADMIN — AMLODIPINE BESYLATE 5 MG: 5 TABLET ORAL at 08:47

## 2018-01-17 ASSESSMENT — PAIN DESCRIPTION - DESCRIPTORS: DESCRIPTORS: SORE

## 2018-01-17 NOTE — PROGRESS NOTES
Pt had hypoglycemic episode. Pt lethargic and diaphoretic. Aroused to voice, but had difficulty staying awake. Given 25 ml dextrose and 1.5 apple juice. Rechecked BG. 198. Pt more alert. MD and charge notified. New orders for D5 NS 20K+ and QID BG checks added. Will continue to monitor closely.

## 2018-01-17 NOTE — PLAN OF CARE
Problem: Renal Failure/Kidney Injury, Acute (Adult)  Goal: Signs and Symptoms of Listed Potential Problems Will be Absent, Minimized or Managed (Renal Failure/Kidney Injury, Acute)  Signs and symptoms of listed potential problems will be absent, minimized or managed by discharge/transition of care (reference Renal Failure/Kidney Injury, Acute (Adult) CPG).   Pt's mentation varied throughout the evening. During hypoglycemic episodes pt very lethargic (see previous note), pt more alert at beginning of shift, but even then had some confusion. Pt disoriented to place (knew it was [place], confused as to it being a hospital), had difficulty following commands and conversations, had delayed responses. Pt refused dinnert. Denied nausea. Reported pain in bottom, repositioned pt q 2 hours. Buttocks had bright red rashlike area on buttocks, blanchable, raised with distinct borders. Voided spontaneously without difficulty. Up with assist of 1 and walker. VSS. Will continue to monitor pt closely.

## 2018-01-17 NOTE — PLAN OF CARE
Problem: Patient Care Overview  Goal: Plan of Care/Patient Progress Review  Physical Therapy Discharge Summary    Reason for therapy discharge:    Discharged to home with home therapy.    Progress towards therapy goal(s). See goals on Care Plan in [facility] health record for goal details.  Goals not met.  Barriers to achieving goals:   discharge from facility.    Therapy recommendation(s):    Continued therapy is recommended.  Rationale/Recommendations:  TCU. Pt and family decline. Pt discharged to home with 24/7 assist and home PT. .

## 2018-01-17 NOTE — PROGRESS NOTES
Care Coordinator Progress Note     Admission Date/Time:  1/2/2018  Attending MD:  Flaca Guillen MD  Hematologist: Dr. Torre/[facility]     Data  Chart reviewed, discussed with interdisciplinary team.   Patient was admitted for: Acute Kidney Injury  Patient has a history of Multiple Myeloma    Concerns with insurance coverage for discharge needs: None.  Current Living Situation: Patient lives with spouse.  Support System: Supportive and Involved  Services Involved: TBD  Transportation: Family or Friend will provide  Barriers to Discharge: Medical Stability    1/4  Coordination of Care and Referrals: Current recommendation is TCU; SW aware. Writer will continue to follow and assist as needed.    1/12 Per HERMILO Yousif, patient may be stable to discharge Sunday. Therapies recommend TCU vs home with 24 hour assist. SW met with patient; please see her note. Patient's preference is to discharge home but is agreeable to TCU if wife feels that would be best. Writer spoke to patient's wife, Shasta. Per Shasta, if therapies continue to feel he would be safe discharging home with assist, she is in agreement with his discharging home. Per Shasta, she and their daughter will be able to provide 24 hour assist. When questioned if home care would be beneficial, Shasta thought a resumption of his outpatient therapies would be more helpful; AVS updated to resume OP PT/OT. The patient has had home care previously and it was not helpful, per Shasta. Shasta will call the clinic to resume services upon discharge. Shasta would like the referrals made to TCUs in case therapy recommendations change.     1/15 Per Dr. Guevara, patient may be stable to discharge tomorrow. Request sent to [facility] for follow up.    1/16 Patient was going to discharge but now on hold due to change in mental status. Writer spoke to patient's wife; she is still in agreement with patient discharging home with 24 hour assist. Writer will continue to  follow.     1/17 Per Maricel Kyle NP, patient stable to discharge today with close clinic follow up; patient is scheduled at [facility] for tomorrow.    Patient's wife again agreed that she will be able to take him home with 24 hour assist. She is now agreeable to home care. Per her request, referral made to [facility] ([phone], fax [phone]) for RN, PT and OT services.     CTS sent to outpatient care coordinator, Melisa Chavez.     Plan  Anticipated Discharge Date:  1/17/2018  Anticipated Discharge Plan:  Home with 24 hour assist and clinic follow up    Chelsea Hughes, EFRENCC  Phone [phone]  Pager [phone]

## 2018-01-17 NOTE — DISCHARGE SUMMARY
[facility] -- Discharge Summary -- Hematology / Oncology  Date of Admission:  1/2/2018  Date of Discharge:  1/17/2018  Discharging Provider: Cynthia Kyle  Date of Service (when I saw the patient): 01/17/18    Discharge Diagnoses   Active Problems:    Multiple myeloma (H)    History of Present Illness   Patient is a very poor historian, unable to complete/interact for HPI. Would fall asleep regularly during questioning. Very slow to respond. Didn't make sense/confused at time. No family present. Able to try and get history using electronic medical record.      Discharged from the hospital on 12/30/17 (admitted 12/25 had been on Zosyn/Azithro/Vanc) was prescribed Augmentin & Zithromax at d/c. Presented to ER on 1/1/18, was given Primaxin (imipenem/Cilastatin) & Vancomycin 1,750 mg. CXR showed prominently diminished lung volumes with streaky & patchy opacities in both lung bases (atelectasis? PNA also possible). Overall slight increase in streaky density at the right lung base compared to the previous study. No PTX. On admission to ER Cr 4.42, BUN 31, K 5.3, HCO3 20, Calcium 11.5, Lactic was 1.4, Ph 6.2, Uric 7.5. WBC 1.8 ANC 1.2, HgB 8.9, PLT 34. UA clear, negative for LE/Nitrite, WBC 3-5, few bacteria, few epithelial cells, CX is NTD. Was continued on Zosyn 3.375 q 8 hours. Attempts were being made to aggressively hydrate the patient as dehydration in combination with MM felt to be culprit of CHRISTIANE.      Hospital D1 (1/2/18) Cr worsened slightly to 4.57, BUN 28, K 5.4, Ph 7.0. WBC 1.9. Blood culture 1/1/18 12:25 pm NTD. Plan to transfer patient to [facility] for further cares (ID consult, Nephrology consult, Heme-Malignancy as primary service, Y[facility] aware of the transfer, [facility] accepting).      Most recent oncology note in Care Everywhere is from 12/22/17 at that time he had recently started Darzalex (2weekly) along with Pomalyst (3mg/day) & decadron 11/30/17. He was  admitted to the hospital with NF (treated with neupogen & antibiotics) & admitted 12/11/17 and d/c'ed on 12/21/17. Plan was to continue Darzalex weekly for 1st 8 weeks (then 9-24 give every 2 weeks). Continue Pomalyst but at 2mg/day r/t TCP (taken D1-21 of each cycle). Plan for 40 mg dex weekly (20 mg x1 instead of 40 on weeks he gets pre-med dex with Darzalex). Per Hospitalist notes at [facility], no chemotherapy since early December 2017. Labs at this visit show Cr 0.83, while in patient for PNA Cr was 1.02-1.13.     Hospital Course    Jeffrey Real is a 53 y/o M with complex PMH including refractory IgA Multiple Myeloma s/p autologous transplant 08/2014, most recently on Daralex/Pom/Decadron (started 11/17), transferred from OSH d/t CHRISTIANE on CKD, persistent sinusitis, HCAP, concern for sepsis, & altered mental status.       # Delirium vs encephalopathy. Felt to be r/t CHRISTIANE and ongoing prolonged hospitalizations. Also consider acyclovir was given for PPX (VZV r/t chemo) while Cr was high.  - Head CT w/o contrast shows no evidence of acute intracranial pathology but + sinusitis & mild chronic small vessel ischemic disease.  - Held PTA oxycodone, dilaudid, ativan & gabapentin d/t potential sedating effects/CHRISTIANE, no withdrawal symptoms. Given reports of pain, added back oxycodone 5-10mg every 4h as needed.  - Given continued improvement, OK to d/c video patient monitoring.  - D/c'd PRN Ativan. QTc 456, using Haldol cautiously (0.5-1mg every 6 hours PRN).   - SLUMs score 20/30 per OT (abnormal).  - Neurology consulted, appreciate their input and recommendations. Video EEG obtained to r/o subclinical seizures. Difficult study given agitation, but indicates mild-to-moderate encephalopathy with no evidence of seizures seen. Last note 1/10 says (neurology will follow peripherally with further rec's to follow if patient should worsen while improving renal function).  - Hyperammonemic on admission, with normal liver function, so  not routinely trended thereafter (70-->92-->95). Level re-checked on 1/11, slightly elevated at 65.  - Surveillance blood cultures & urine culture drawn (1/10), and 1/16, continue to be NTD.   - Unable to obtain brain MRI secondary to CHRISTIANE.  - Continue to hold acyclovir in the event it is exacerbating/contributing to AMS now on Valtrex for PPX.   - Please don't give oxycodone during the day, if you haven't already tried tylenol, heat (aqua K), cold (ice pack), biofreeze, lidocaine patch, position changes (up to chair). Patient very sensitive to medications. Sent with PRN for tramadol.       # High Risk/Resistant Multiple Myeloma IgA kappa. S/P autologous peripheral blood stem cell transplant in 08/2014.   # Multiple lytic lesions 2/2 MM (ribs, T7, T9, T11, T12) s/p XRT to thoracolumbar spine.  # Fracture L ischium.  # Hypogammaglobulinemia s/p IVIG replacement.  S/P multiple lines of therapy including auto tx (RVD, auto+melphalan, Velcade Maintenance, VDT-PACE, followed by PCD) complex cytogenetics (17 p deletion, p53 deletion) most recently on pomalidomide/carfilzomib & dex 20+ cycles (started 02/2017) with progression in November 2017. Most recently switched to daratumumab/pomalidomide/dex.  - Plan was to try to get M-spike < 1 gram% then pursue Allogeneic transplant (saw Dr. Torre in clinic 11/27/17). Has brother as haplo-identical donor.  - 12/26/17 found to have pathologic fractures of T2 spinous process & bilateral T1 transverse process. Destructive lytic lesion involving left scapula (partially imaged).   - S/p dex and daratumumab on 1/6/18, holding Pomalyst. Per wife, this was his 3rd dose of daratumumab since initiating in November 2017. Myeloma labs from this admission compared to prior labs from Nov 2017 at OSH, ? Slightly worse.  - Dex and daratumumab per protocol given on 1/13.  - Labs & CR[facility] later this week, Daratumumab next Monday, then Y[facility] SANDY 1/30.   - In clinic to decide if transferring  back to [facility] for care.       # Pancytopenia 2/2 to MM & related therapy. Recommended dose decrease with subsequent cycles.  - Keep HgB > 8, PLT > 10.       # Acute Kidney Injury/Failure 2/2 to ATN. Cr 4.5 at OSH (up from baseline 0.8-1.1). Likely r/t contrast (got facial bone contrast CT on 12/27 at OSH, + underlying MM) vs. worsening MM vs. Sepsis/infection. No evidence of hypotension in OSH records. Unable to see if he was anuric at OSH.  # Hyperkalemia --> RESOLVED, now HYPOkalemic  # Hyperphosphatemia --> improving  # Hyperuricemia --> improving  # Chronic kidney disease 2/2 to MM.  # Hx. CHRISTIANE requiring HD from sepsis.  - Avoid nephrotoxins, contrast, renally dose medications.  - Nephrology consulted, appreciate their input and assistance.  - Cr improving on d/c, good UOP, no issues with electrolytes or fluid balance.       # Hypercalcemia. Last dose of Zometa was 12/7/17, on monthly schedule.  - Due for Zometa but holding given CHRISTIANE. Ionized calcium normalized, 5.0 on 1/9. Continue to follow.   - Gave pamidronate today 60 mg dose prior to d/c.      #Hypoglycemia.  -Noted intermittently. Reviewed records from OSH through CareEverywhere, appears to have occurred PTA as well. ? Due to CHRISTIANE. Continue to monitor, hypoglycemia protocol in place.  - Encourage PO intake.      #Prolonged QTc  -Level on admission at 471. Repeat EKG 1/9 is 455. D/c'd Zofran, using Haldol with caution as above. Past EKGs show interval normal 8/2017, first prolonged on 12/25.      # HCAP & Sinusitis.   # Fever without neutropenia.  # RSV  # Hypogammaglobulinemia.  Streaky R lung base opacity. S/P multiple hospitalizations. Most recently 12/11-12/21, 12/25-12/30, & now 1/1- current (transfer from OSH). CT sinus + for sinusitis. Head CT 1/2/18 showing persistent sinusitis.  - Urine & blood CX NGTD from OSH. Unable to collect sputum culture.   - On Zosyn 1/2-1/4. Abx stopped given positive RSV, and negative cultures.  - Repeat blood culture  & UA/UC ordered (NGTD, continue to monitor).  - ID consulted, appreciate their input. No ribavirin given for RSV d/t CHRISTIANE. Infectious workup otherwise negative.  - S/p sucrose-free IVIG on 1/8.  - Supportive care.       # ID PPX. S/p Pentamidine 1/5/17. On Valtrex for VZV ppx.      # GERD, hx. Candida esophagitis.  - Protonix 40mg daily.      # Hx. Vtach s/p ICD placement.  # Hx. HTN.  - Diltiazem 60 mg BID initially held due to need for close monitoring of BPs for possible sepsis. Restarted 1/5/17, BPs stable, now hypertensive.   - Re-started metoprolol 1/13 at 12.5mg BID, improved, will dose increase to 25 mg BID (1/15/18).  - Added amlodipine today 5 mg daily (effect on kidneys, although same CCB).     # Chronic pain 2/2 to MM. Multiple fractures/lytic lesions.  Held PTA meds d/t encephalopathy (OxyContin, Dilaudid).   - Very little pain med needs throughout admission. OK for PRN oxycodone per above.      # Peripheral neuropathy 2/2 to MM therapy. Holding PTA gabapentin.  - Continue to hold at this time.    # Major depression. Continue PTA Celexa.    # Weakness/deconditioning 2/2 to multiple hospitalizations, MM/therapy, & chronic disease.  # Chronic fatigue.  - PT/OT consult.   - d/c to home with OT/PT outpatient & home care RN referral.      # Diarrhea  -C.diff negative.     # Mild-Moderate Malnutrition 2/2 to chronic disease/infections, multiple hospitalizations.  - Regular diet, encourage snacks/supplements.  - Juan Jose counts initiated (1/5).    Cynthia Herrijay  [facility]  [phone]  Hematology/Oncology  January 17, 2018      Pending Results   These results will be followed up by Russel SHEPARD  Unresulted Labs Ordered in the Past 30 Days of this Admission     Date and Time Order Name Status Description    1/16/2018 1358 Blood culture Preliminary     1/3/2018 1254 Fungus culture Preliminary         Code Status  FULL    Primary Care Physician   Hipolito Rollins    Physical Exam   Vital Signs with Ranges  Temp:   [95.2  F (35.1  C)-98.9  F (37.2  C)] 96.8  F (36  C)  Pulse:  [96] 96  Heart Rate:  [] 83  Resp:  [16-20] 18  BP: (117-143)/(77-90) 117/77  SpO2:  [95 %-99 %] 97 %  Constitutional: Seen awake up in chair eating breakfast, alert, cooperative, interactive. In the afternoon saw patient again up walking the moore with minimal assist, but using the walker.   ENT: Mucous membranes pink and moist, no lesions, no thrush.  Respiratory: No increased work of breathing, good air exchange, lungs clear.   Cardiovascular: Regular rate and rhythm. No murmur. No edema.   Abdominal: + bowel sounds, soft, non-tender, non-distended.  Musculoskeletal: 5+ strength, moving all extremities equal.   Neuropsychiatric: Alert, awake, oriented x 3. No focal neurologic deficits. PERRLA.  SKIN: skin is warm & dry, free of lesions/rash.     Discharge Disposition  Home & in stable condition, with very close follow up scheduled.     Discharge Orders     CBC with platelets differential   Last Lab Result: Hemoglobin (g/dL)      Date                     Value                01/16/2018               9.5 (L)          ----------     Comprehensive metabolic panel     Home care nursing referral     Home Care PT Referral for Hospital Discharge     Home Care OT Referral for Hospital Discharge     Reason for your hospital stay   Admitted 2/2 to relapsed MM, CHRISTIANE, encephalopathy.     Activity   Your activity upon discharge: as tolerated, please continue outpatient PT to regain strength     When to contact your care team   Please call the [facility] Triage RN Line [phone] ([facility] RN available M-F 8-5, after 5 pm the RN Advisor will page the On-Call Oncology Fellow who will return your call) for temperature greater than 100.4 F, uncontrolled nausea/vomiting/diarrhea or unrelieved constipation, pain not relieved by medications, bleeding not stopped by pressure, dizziness, chest pain, shortness of breath, changes in level of consciousness, or any  other new concerning symptoms.     Adult [facility] Follow-up and recommended labs and tests   - Please f/u with [facility] tomorrow for labs.   - Please f/u for Daratumumab next week.  - Please f/u with Nishai on 1/30.  - RN CC to re-enroll in home care.  - RN CC to re-enroll it outpatient PT/OT.    Appointments on [facility] and/or [facility] (with [facility] or [facility] provider or service). Call [phone] if you haven't heard regarding these appointments within 7 days of discharge.     MD face to face encounter   Documentation of Face to Face and Certification for Home Health Services    I certify that patient: Jeffrey Real is under my care and that I, or a nurse practitioner or physician's assistant working with me, had a face-to-face encounter that meets the physician face-to-face encounter requirements with this patient on: 1/17/2018.    This encounter with the patient was in whole, or in part, for the following medical condition, which is the primary reason for home health care: Multiple Myelom.    I certify that, based on my findings, the following services are medically necessary home health services: Nursing, Occupational Therapy and Physical Therapy.    My clinical findings support the need for the above services because: Nurse is needed: To assess status after changes in medications or other medical regimen. Occupational Therapy Services are needed to assess and treat cognitive ability and address ADL safety due to impairment in cognition. Physical Therapy Services are needed to assess and treat the following functional impairments: deconditioning.    Further, I certify that my clinical findings support that this patient is homebound (i.e. absences from home require considerable and taxing effort and are for medical reasons or Druze services or infrequently or of short duration when for other reasons) because: Requires assistance of another person or specialized equipment to access medical services  because patient: Requires supervision of another for safe transfer...    Based on the above findings. I certify that this patient is confined to the home and needs intermittent skilled nursing care, physical therapy and/or speech therapy.  The patient is under my care, and I have initiated the establishment of the plan of care.  This patient will be followed by a physician who will periodically review the plan of care.  Physician/Provider to provide follow up care: Hipolito Rollins    Attending hospital physician (the Medicare certified [facility] provider): Dr. Esthela Sung    Physician Signature: See electronic signature associated with these discharge orders.  Date: 1/17/2018     Full Code     Diet   Follow this diet upon discharge: regular       Discharge Medications   Discharge Medication List as of 1/17/2018  3:14 PM      START taking these medications    Details   traMADol (ULTRAM) 50 MG tablet Take 1 tablet (50 mg) by mouth every 6 hours as needed for moderate pain, Disp-20 tablet, R-0, Local Print      Lidocaine (LIDOCARE) 4 % Patch Place 2 patches onto the skin every 24 hoursDisp-30 patch, [id number]      melatonin 3 MG tablet Take 1 tablet (3 mg) by mouth At Bedtime, Disp-10 tablet, R-0, E-Prescribe      valACYclovir (VALTREX) 1000 mg tablet Take 1 tablet (1,000 mg) by mouth daily, Disp-14 tablet, R-1, E-Prescribe      amLODIPine (NORVASC) 5 MG tablet Take 1 tablet (5 mg) by mouth daily, Disp-30 tablet, R-0, E-Prescribe         CONTINUE these medications which have CHANGED    Details   acetaminophen (TYLENOL) 325 MG tablet Take 2 tablets (650 mg) by mouth every 4 hours as needed for mild pain, Disp-100 tablet, OTC      citalopram (CELEXA) 40 MG tablet Take 1 tablet (40 mg) by mouth daily, Disp-60 tablet, R-0, E-Prescribe      metoprolol tartrate (LOPRESSOR) 25 MG tablet Take 1 tablet (25 mg) by mouth 2 times daily, Disp-20 tablet, R-0, E-Prescribe         CONTINUE these medications which have NOT CHANGED     Details   diltiazem (CARDIZEM) 30 MG tablet Take 60 mg by mouth, Historical      ondansetron (ZOFRAN-ODT) 4 MG ODT tab Place 4 mg under the tongue, Historical      LORazepam (ATIVAN PO) Take by mouth At Bedtime, Historical      Misc. Devices (QUAD CANE) MISC Wide Base Quad Cane for home use. For 6 weeks., Historical      OMEPRAZOLE PO Take by mouth every morning, Historical         STOP taking these medications       oxyCODONE IR (ROXICODONE) 5 MG tablet Comments:   Reason for Stopping:         ACYCLOVIR PO Comments:   Reason for Stopping:         oxyCODONE (OXYCONTIN) 20 MG 12 hr tablet Comments:   Reason for Stopping:         Calcium Carb-Cholecalciferol (CALCIUM-VITAMIN D3) 600-400 MG-UNIT CAPS Comments:   Reason for Stopping:         dexamethasone (DECADRON) 4 MG tablet Comments:   Reason for Stopping:         HYDROmorphone (DILAUDID) 2 MG tablet Comments:   Reason for Stopping:         loperamide (IMODIUM A-D) 2 MG tablet Comments:   Reason for Stopping:         pantoprazole (PROTONIX) 40 MG EC tablet Comments:   Reason for Stopping:         pomalidomide (POMALYST) 3 MG capsule Comments:   Reason for Stopping:         oxyCODONE (OXYCONTIN) 10 MG 12 hr tablet Comments:   Reason for Stopping:         Lenalidomide (REVLIMID PO) Comments:   Reason for Stopping:         GABAPENTIN PO Comments:   Reason for Stopping:         FAMCICLOVIR PO Comments:   Reason for Stopping:         TRAZODONE HCL PO Comments:   Reason for Stopping:         OXYCODONE HCL PO Comments:   Reason for Stopping:             Allergies   Allergies   Allergen Reactions     Blood-Group Specific Substance      Other reaction(s): Other - Describe In Comment Field  Patient has a Nonspecific antibody. Blood Product orders may be delayed.  Draw one red top and two purple top tubes for ALL Type and Screen/ Type and Crossmatch orders.     Chlorhexidine      Other reaction(s): Irritation At Patch Site  Itching with Prevantic Swabs     Levofloxacin Rash      Stopped levaquin and rash resolved by 8/8/14     Data   Most Recent 3 CBC's:  Recent Labs   Lab Test  01/17/18   0703  01/16/18   0527  01/15/18   0639   WBC  2.0*  1.8*  1.9*   HGB  8.5*  9.5*  8.7*   MCV  95  94  95   PLT  38*  43*  40*      Most Recent 3 BMP's:  Recent Labs   Lab Test  01/17/18   0703  01/16/18   0527  01/15/18   0639   NA  137  135  137   POTASSIUM  4.3  3.3*  3.6   CHLORIDE  105  99  103   CO2  22  22  22   BUN  18  22  18   CR  2.00*  2.29*  2.26*   ANIONGAP  10  14  12   PARADISE  10.0  11.1*  10.6*   GLC  123*  98  87     Most Recent 2 LFT's:  Recent Labs   Lab Test  01/09/18   0536  01/05/18   0651   AST  40  19   ALT  26  11   ALKPHOS  122  128   BILITOTAL  0.4  0.4     Most Recent INR's and Anticoagulation Dosing History:  Anticoagulation Dose History     Recent Dosing and Labs Latest Ref Rng & Units 1/2/2018    INR 0.86 - 1.14 1.38(H)        Most Recent 3 Troponin's:  Recent Labs   Lab Test  01/07/18   0001   TROPI  <0.015     Most Recent Cholesterol Panel:No lab results found.  Most Recent 6 Bacteria Isolates From Any Culture (See EPIC Reports for Culture Details):  Recent Labs   Lab Test  01/16/18   1506  01/10/18   1240  01/10/18   1211  01/10/18   1204  01/04/18   0610  01/04/18   0557   CULT  No growth after 18 hours  <10,000 colonies/mL  mixed urogenital tony  Susceptibility testing not routinely done    No growth  No growth  No growth  No growth     Most Recent TSH, T4 and A1c Labs:  Recent Labs   Lab Test  01/10/18   0600   A1C  5.8

## 2018-01-17 NOTE — PROGRESS NOTES
Pt had a second hypoglycemic episode. BG 64. Pt diaphoretic and lethargic, aroused to voice. Delayed responses. Given 25 ml dextrose IV and 1 apple juice. BG rechecked - 162 and then 152.  Newly ordered IV fluids started on pt. Charge notified. Will recheck before end of shift.

## 2018-01-17 NOTE — PLAN OF CARE
Problem: Patient Care Overview  Goal: Plan of Care/Patient Progress Review  VSS. BP elevated 143/90. Denies pain. Oriented x 4, though slow to process, forgetful. BG check at 0200 was 75, pt drank 2x apple juice, recheck was 102. Pt declined to eat any other snacks. Up with Ax1 to bathroom, voiding adequately. Rash on buttocks treated with zinc oxide cream, open to air, pt wearing cotton pajama bottoms (rather than disposable brief). Bed alarm on for safety.     Addendum: pt found on the floor after calling to sit in chair early this AM. Pt reported he laid on the ground because he was uncomfortable. VSS. BG at that time was 67. Pt given apple juice x 2, cheerios and milk. Pt also given toast and PB, but only ate one bite. Recheck was 86 and 126. Tylenol given for back pain, and pt repositioned in bed with pillows. MD notified of event. No new interventions at this time.  Pt would benefit from nutrition consult, very poor oral intake.

## 2018-01-18 ENCOUNTER — INFUSION THERAPY VISIT (OUTPATIENT)
Dept: TRANSPLANT | Facility: CLINIC | Age: 53
End: 2018-01-18
Payer: COMMERCIAL

## 2018-01-18 ENCOUNTER — APPOINTMENT (OUTPATIENT)
Dept: LAB | Facility: CLINIC | Age: 53
End: 2018-01-18
Attending: INTERNAL MEDICINE
Payer: COMMERCIAL

## 2018-01-18 ENCOUNTER — ONCOLOGY VISIT (OUTPATIENT)
Dept: ONCOLOGY | Facility: CLINIC | Age: 53
End: 2018-01-18
Attending: PHYSICIAN ASSISTANT
Payer: COMMERCIAL

## 2018-01-18 VITALS
RESPIRATION RATE: 16 BRPM | TEMPERATURE: 98 F | HEIGHT: 70 IN | HEART RATE: 86 BPM | WEIGHT: 166.4 LBS | BODY MASS INDEX: 23.82 KG/M2 | SYSTOLIC BLOOD PRESSURE: 120 MMHG | OXYGEN SATURATION: 99 % | DIASTOLIC BLOOD PRESSURE: 72 MMHG

## 2018-01-18 DIAGNOSIS — R94.31 PROLONGED Q-T INTERVAL ON ECG: Primary | ICD-10-CM

## 2018-01-18 DIAGNOSIS — N17.9 ACUTE KIDNEY INJURY (H): ICD-10-CM

## 2018-01-18 DIAGNOSIS — C90.02 MULTIPLE MYELOMA IN RELAPSE (H): Primary | ICD-10-CM

## 2018-01-18 DIAGNOSIS — C90.02 MULTIPLE MYELOMA IN RELAPSE (H): ICD-10-CM

## 2018-01-18 LAB
ALBUMIN SERPL-MCNC: 2.2 G/DL (ref 3.4–5)
ALP SERPL-CCNC: 199 U/L (ref 40–150)
ALT SERPL W P-5'-P-CCNC: 29 U/L (ref 0–70)
ANION GAP SERPL CALCULATED.3IONS-SCNC: 8 MMOL/L (ref 3–14)
ANISOCYTOSIS BLD QL SMEAR: SLIGHT
AST SERPL W P-5'-P-CCNC: 44 U/L (ref 0–45)
BASOPHILS # BLD AUTO: 0.1 10E9/L (ref 0–0.2)
BASOPHILS NFR BLD AUTO: 3 %
BILIRUB SERPL-MCNC: 0.4 MG/DL (ref 0.2–1.3)
BUN SERPL-MCNC: 19 MG/DL (ref 7–30)
CALCIUM SERPL-MCNC: 10.5 MG/DL (ref 8.5–10.1)
CHLORIDE SERPL-SCNC: 99 MMOL/L (ref 94–109)
CO2 SERPL-SCNC: 24 MMOL/L (ref 20–32)
CREAT SERPL-MCNC: 2.08 MG/DL (ref 0.66–1.25)
DIFFERENTIAL METHOD BLD: ABNORMAL
EOSINOPHIL # BLD AUTO: 0 10E9/L (ref 0–0.7)
EOSINOPHIL NFR BLD AUTO: 0 %
ERYTHROCYTE [DISTWIDTH] IN BLOOD BY AUTOMATED COUNT: 17.2 % (ref 10–15)
GFR SERPL CREATININE-BSD FRML MDRD: 34 ML/MIN/1.7M2
GLUCOSE SERPL-MCNC: 91 MG/DL (ref 70–99)
HCT VFR BLD AUTO: 30.2 % (ref 40–53)
HGB BLD-MCNC: 9.5 G/DL (ref 13.3–17.7)
LYMPHOCYTES # BLD AUTO: 0.3 10E9/L (ref 0.8–5.3)
LYMPHOCYTES NFR BLD AUTO: 12 %
MAGNESIUM SERPL-MCNC: 2.3 MG/DL (ref 1.6–2.3)
MCH RBC QN AUTO: 30.4 PG (ref 26.5–33)
MCHC RBC AUTO-ENTMCNC: 31.5 G/DL (ref 31.5–36.5)
MCV RBC AUTO: 97 FL (ref 78–100)
MONOCYTES # BLD AUTO: 0.2 10E9/L (ref 0–1.3)
MONOCYTES NFR BLD AUTO: 8 %
NEUTROPHILS # BLD AUTO: 1.6 10E9/L (ref 1.6–8.3)
NEUTROPHILS NFR BLD AUTO: 77 %
NRBC # BLD AUTO: 0 10*3/UL
NRBC BLD AUTO-RTO: 2 /100
PHOSPHATE SERPL-MCNC: 5 MG/DL (ref 2.5–4.5)
PLATELET # BLD AUTO: 43 10E9/L (ref 150–450)
POIKILOCYTOSIS BLD QL SMEAR: SLIGHT
POTASSIUM SERPL-SCNC: 4.4 MMOL/L (ref 3.4–5.3)
PROT SERPL-MCNC: 14.1 G/DL (ref 6.8–8.8)
RBC # BLD AUTO: 3.12 10E12/L (ref 4.4–5.9)
ROULEAUX BLD QL SMEAR: ABNORMAL
SODIUM SERPL-SCNC: 131 MMOL/L (ref 133–144)
URATE SERPL-MCNC: 6.6 MG/DL (ref 3.5–7.2)
WBC # BLD AUTO: 2.1 10E9/L (ref 4–11)

## 2018-01-18 PROCEDURE — G0463 HOSPITAL OUTPT CLINIC VISIT: HCPCS | Mod: 25

## 2018-01-18 PROCEDURE — 83883 ASSAY NEPHELOMETRY NOT SPEC: CPT | Performed by: PHYSICIAN ASSISTANT

## 2018-01-18 PROCEDURE — 83735 ASSAY OF MAGNESIUM: CPT | Performed by: NURSE PRACTITIONER

## 2018-01-18 PROCEDURE — 96360 HYDRATION IV INFUSION INIT: CPT

## 2018-01-18 PROCEDURE — 82784 ASSAY IGA/IGD/IGG/IGM EACH: CPT | Performed by: PHYSICIAN ASSISTANT

## 2018-01-18 PROCEDURE — 25000128 H RX IP 250 OP 636: Mod: ZF | Performed by: PHYSICIAN ASSISTANT

## 2018-01-18 PROCEDURE — 84100 ASSAY OF PHOSPHORUS: CPT | Performed by: NURSE PRACTITIONER

## 2018-01-18 PROCEDURE — 80053 COMPREHEN METABOLIC PANEL: CPT | Performed by: NURSE PRACTITIONER

## 2018-01-18 PROCEDURE — 85025 COMPLETE CBC W/AUTO DIFF WBC: CPT | Performed by: NURSE PRACTITIONER

## 2018-01-18 PROCEDURE — 84165 PROTEIN E-PHORESIS SERUM: CPT | Performed by: PHYSICIAN ASSISTANT

## 2018-01-18 PROCEDURE — 00000402 ZZHCL STATISTIC TOTAL PROTEIN: Performed by: PHYSICIAN ASSISTANT

## 2018-01-18 PROCEDURE — 99215 OFFICE O/P EST HI 40 MIN: CPT | Mod: ZP | Performed by: PHYSICIAN ASSISTANT

## 2018-01-18 PROCEDURE — 84550 ASSAY OF BLOOD/URIC ACID: CPT | Performed by: NURSE PRACTITIONER

## 2018-01-18 PROCEDURE — 96361 HYDRATE IV INFUSION ADD-ON: CPT

## 2018-01-18 RX ORDER — CODEINE PHOSPHATE/GUAIFENESIN 10-100MG/5
LIQUID (ML) ORAL PRN
COMMUNITY
Start: 2016-05-31

## 2018-01-18 RX ORDER — ZOLEDRONIC ACID 4 MG/5ML
4 INJECTION INTRAVENOUS
COMMUNITY

## 2018-01-18 RX ORDER — METOPROLOL SUCCINATE 25 MG/1
TABLET, EXTENDED RELEASE ORAL DAILY
COMMUNITY
Start: 2016-04-05

## 2018-01-18 RX ORDER — HEPARIN SODIUM (PORCINE) LOCK FLUSH IV SOLN 100 UNIT/ML 100 UNIT/ML
5 SOLUTION INTRAVENOUS ONCE
Status: COMPLETED | OUTPATIENT
Start: 2018-01-18 | End: 2018-01-18

## 2018-01-18 RX ORDER — PROCHLORPERAZINE MALEATE 10 MG
10 TABLET ORAL PRN
COMMUNITY
Start: 2017-11-28

## 2018-01-18 RX ORDER — DIPHENHYDRAMINE HYDROCHLORIDE 50 MG/ML
50 INJECTION INTRAMUSCULAR; INTRAVENOUS
COMMUNITY
Start: 2017-11-28

## 2018-01-18 RX ORDER — DEXAMETHASONE SODIUM PHOSPHATE 10 MG/ML
20 INJECTION INTRAMUSCULAR; INTRAVENOUS PRN
COMMUNITY
Start: 2017-11-28

## 2018-01-18 RX ADMIN — HEPARIN SODIUM (PORCINE) LOCK FLUSH IV SOLN 100 UNIT/ML 5 ML: 100 SOLUTION at 13:33

## 2018-01-18 RX ADMIN — SODIUM CHLORIDE 1000 ML: 9 INJECTION, SOLUTION INTRAVENOUS at 13:56

## 2018-01-18 ASSESSMENT — PAIN SCALES - GENERAL: PAINLEVEL: SEVERE PAIN (7)

## 2018-01-18 NOTE — NURSING NOTE
"Chief Complaint   Patient presents with     Blood Draw     labs drawn by venipuncture by rn.  vital signs taken.     Oncology Clinic Visit     Return visit related to Multiple Myeloma     /72 (BP Location: Right arm, Patient Position: Sitting, Cuff Size: Adult Regular)  Pulse 86  Temp 98  F (36.7  C) (Oral)  Resp 16  Ht 1.778 m (5' 10\")  Wt 75.5 kg (166 lb 6.4 oz)  SpO2 99%  BMI 23.88 kg/m2    Port accessed by RN. Additional labs collected and sent. Line flushed with NS & Heparin.    Lorena Hoover, RN    "

## 2018-01-18 NOTE — MR AVS SNAPSHOT
After Visit Summary   1/18/2018    Jeffrey Real    MRN: [id number]           Patient Information     Date Of Birth          1965        Visit Information        Provider Department      1/18/2018 12:30 PM Russel Bliss PA [facility]        Today's Diagnoses     Prolonged Q-T interval on ECG    -  1    Acute kidney injury (H)        Multiple myeloma in relapse (H)           Follow-ups after your visit        Your next 10 appointments already scheduled     Jan 22, 2018  9:15 AM CST   [facility] Draw with  [facility]   [facility] Draw ([facility])    [address] [place] [address]  [address]  [facility] [address]   [phone]            Jan 22, 2018 10:00 AM CST   (Arrive by 9:45 AM)   Return Visit with HERMILO Burnham   [facility] ([facility])    [address] [place] [address]  [address]  [facility] [address]   [phone]            Jan 22, 2018 11:30 AM CST   Infusion 360 with  ONCOLOGY INFUSION, UC 17 ATC   [facility] ([facility])    [address] [place] [address]  [address]  [facility] [address]   [phone]            Jan 30, 2018  2:00 PM CST   [facility] Lab Draw with  [facility] DRAW   [facility] Draw ([facility])    [address] [place] [address]  [address]  [facility] [address]   [phone]            Jan 30, 2018  2:30 PM CST   RETURN ONC with James Torre MD   [facility] Marrow Transplant ([facility])    [address] [place] [address]  [address]  [facility] [address]   [phone]              Future tests that were ordered for you today     Open Future Orders        Priority Expected Expires Ordered    EKG 12-lead complete w/read - Clinics Routine 1/18/2018 1/19/2018 1/17/2018            Who to contact     If you have questions or need follow up information  "about today's clinic visit or your schedule please contact [facility] directly at [phone].  Normal or non-critical lab and imaging results will be communicated to you by [employer], letter or phone within 4 business days after the clinic has received the results. If you do not hear from us within 7 days, please contact the clinic through [employer]um[employer] or phone. If you have a critical or abnormal lab result, we will notify you by phone as soon as possible.  Submit refill requests through [employer] or call your pharmacy and they will forward the refill request to us. Please allow 3 business days for your refill to be completed.          Additional Information About Your Visit        [email or web address] Information     [employer] gives you secure access to your electronic health record. If you see a primary care provider, you can also send messages to your care team and make appointments. If you have questions, please call your primary care clinic.  If you do not have a primary care provider, please call [phone] and they will assist you.        Care EveryWhere ID     This is your Care EveryWhere ID. This could be used by other organizations to access your [place] medical records  [id number]        Your Vitals Were     Pulse Temperature Respirations Height Pulse Oximetry BMI (Body Mass Index)    86 98  F (36.7  C) (Oral) 16 1.778 m (5' 10\") 99% 23.88 kg/m2       Blood Pressure from Last 3 Encounters:   01/18/18 120/72   01/17/18 117/77   11/27/17 120/79    Weight from Last 3 Encounters:   01/18/18 75.5 kg (166 lb 6.4 oz)   01/17/18 72 kg (158 lb 12.8 oz)   11/27/17 83.8 kg (184 lb 11.2 oz)              We Performed the Following     CBC with platelets differential     Comprehensive metabolic panel     Immunoglobulins A G and M     Kappa and lambda light chain (Serum)     Magnesium     Phosphorus     Protein electrophoresis     Uric acid        Primary Care Provider Office Phone # Fax #    Hipolito Rollins,  " [phone]       [facility] [address] [facility] [address]  [employer] [address]        Equal Access to Services     NALINI MCKEON : Hadii aad ku hadstevelina Stevenali, warakeshda mikeyryanneha, qasunshineta kakaidenda marilu, adiel hubertin hayaajess montanolali de la rosa laMarcelokuldeep wynne. So [facility] [phone].    ATENCIÓN: Si habla español, tiene a guardado disposición servicios gratuitos de asistencia lingüística. Llame al [phone].    We comply with applicable federal civil rights laws and [place] laws. We do not discriminate on the basis of race, color, national origin, age, disability, sex, sexual orientation, or gender identity.            Thank you!     Thank you for choosing [facility]  for your care. Our goal is always to provide you with excellent care. Hearing back from our patients is one way we can continue to improve our services. Please take a few minutes to complete the written survey that you may receive in the mail after your visit with us. Thank you!             Your Updated Medication List - Protect others around you: Learn how to safely use, store and throw away your medicines at www.disposemymeds.org.          This list is accurate as of: 1/18/18  4:01 PM.  Always use your most recent med list.                   Brand Name Dispense Instructions for use Diagnosis    acetaminophen 325 MG tablet    TYLENOL    100 tablet    Take 2 tablets (650 mg) by mouth every 4 hours as needed for mild pain    Multiple myeloma in relapse (H)       amLODIPine 5 MG tablet    NORVASC    30 tablet    Take 1 tablet (5 mg) by mouth daily    Multiple myeloma in relapse (H)       ATIVAN PO      Take by mouth At Bedtime        citalopram 40 MG tablet    celeXA    60 tablet    Take 1 tablet (40 mg) by mouth daily    Multiple myeloma in relapse (H)       daratumumab 400 MG/20ML injection    DARZALEX     Inject 1,312 mg into the vein        dexamethasone 10 MG/ML injection    DECADRON     Inject 20 mg into the vein as needed        diltiazem  30 MG tablet    CARDIZEM     Take 60 mg by mouth        diphenhydrAMINE 50 MG/ML injection    BENADRYL     Inject 50 mg into the vein        guaiFENesin-codeine 100-10 MG/5ML Syrp syrup    guaiFENesin AC     Take by mouth as needed        Lidocaine 4 % Patch    LIDOCARE    30 patch    Place 2 patches onto the skin every 24 hours    Multiple myeloma in relapse (H)       melatonin 3 MG tablet     10 tablet    Take 1 tablet (3 mg) by mouth At Bedtime    Multiple myeloma in relapse (H)       metoprolol succinate 25 MG 24 hr tablet    TOPROL-XL     Take by mouth daily        metoprolol tartrate 25 MG tablet    LOPRESSOR    20 tablet    Take 1 tablet (25 mg) by mouth 2 times daily    Multiple myeloma in relapse (H)       OMEPRAZOLE PO      Take by mouth every morning        ondansetron 4 MG ODT tab    ZOFRAN-ODT     Place 4 mg under the tongue        prochlorperazine 10 MG tablet    COMPAZINE     Take 10 mg by mouth as needed        Quad Cane Misc      Wide Base Quad Cane for home use. For 6 weeks.        traMADol 50 MG tablet    ULTRAM    20 tablet    Take 1 tablet (50 mg) by mouth every 6 hours as needed for moderate pain    Multiple myeloma in relapse (H)       valACYclovir 1000 mg tablet    VALTREX    14 tablet    Take 1 tablet (1,000 mg) by mouth daily    Multiple myeloma in relapse (H)       zoledronic acid 4 MG/5ML injection    ZOMETA     Inject 4 mg into the vein

## 2018-01-18 NOTE — LETTER
1/18/2018       RE: Jeffrey Real  [address] [facility] [address] [facility] [address]     Dear Colleague,    Thank you for referring your patient, Jeffrey Real, to the [facility] at [facility]. Please see a copy of my visit note below.    Oncology/Hematology Visit Note  Jan 18, 2018    Reason for Visit: Follow up of multiple myeloma, hospital follow-up    History of Present Illness: Jeffrey Real is a 52 year old male with refractory IgA multiple myeloma s/p autologous transplant 08/2014 most recently on daratumumab, pomalyst, and decardron since November 2017. He was admitted 1/2/18-1/17/18 for CHRISTIANE, delirium, hypercalcemia, and RSV. He returns today for hospital follow-up.     Interval History:  Mr. Real returns to clinic today with his wife. They overall feel things have gone well since returning home. He did feel slightly dizzy and diaphoretic this AM but this resolved. He does not know if he blood sugar was low as he did not have strips to test it.    He states he has been able to walk around with his walker and while he still feels weak, he has not declined since discharge yesterday. He is trying his best to eat and drink well but still has a poor appetite. He denies nausea, vomiting, or abdominal pain. He has had return of his prior diarrhea (2-3 episodes today) and is wondering if he can take imodium. He denies urinary concerns.    He states his back is quite uncomfortable today with 7/10 pain that is typical for him. He is just doing Tylenol during the day but did try Tramadol last evening. No acute mental status changes after the Tramadol and his wife knows to be cautious with the pain medications.     He denies fevers, chills, headaches, chest pain, shortness of breath, cough, peripheral edema, rashes, or bleeding issues.     Current Outpatient Prescriptions   Medication Sig Dispense Refill     traMADol (ULTRAM) 50 MG tablet Take 1 tablet (50 mg) by mouth  "every 6 hours as needed for moderate pain 20 tablet 0     Lidocaine (LIDOCARE) 4 % Patch Place 2 patches onto the skin every 24 hours 30 patch 0     melatonin 3 MG tablet Take 1 tablet (3 mg) by mouth At Bedtime 10 tablet 0     acetaminophen (TYLENOL) 325 MG tablet Take 2 tablets (650 mg) by mouth every 4 hours as needed for mild pain 100 tablet      citalopram (CELEXA) 40 MG tablet Take 1 tablet (40 mg) by mouth daily 60 tablet 0     valACYclovir (VALTREX) 1000 mg tablet Take 1 tablet (1,000 mg) by mouth daily 14 tablet 1     metoprolol tartrate (LOPRESSOR) 25 MG tablet Take 1 tablet (25 mg) by mouth 2 times daily 20 tablet 0     amLODIPine (NORVASC) 5 MG tablet Take 1 tablet (5 mg) by mouth daily 30 tablet 0     Misc. Devices (QUAD CANE) MISC Wide Base Quad Cane for home use. For 6 weeks.       diltiazem (CARDIZEM) 30 MG tablet Take 60 mg by mouth       ondansetron (ZOFRAN-ODT) 4 MG ODT tab Place 4 mg under the tongue       OMEPRAZOLE PO Take by mouth every morning       LORazepam (ATIVAN PO) Take by mouth At Bedtime         Physical Examination:  /72 (BP Location: Right arm, Patient Position: Sitting, Cuff Size: Adult Regular)  Pulse 86  Temp 98  F (36.7  C) (Oral)  Resp 16  Ht 1.778 m (5' 10\")  Wt 75.5 kg (166 lb 6.4 oz)  SpO2 99%  BMI 23.88 kg/m2  Wt Readings from Last 10 Encounters:   01/17/18 72 kg (158 lb 12.8 oz)   11/27/17 83.8 kg (184 lb 11.2 oz)   12/03/15 97.3 kg (214 lb 8.1 oz)     Constitutional: Chronically ill-appearing male in no acute distress.  Eyes: EOMI, PERRL. No scleral icterus. Small lesion on left sclera.  ENT: Oral mucosa is moist without lesions or thrush.   Lymphatic: Neck is supple without cervical or supraclavicular lymphadenopathy.  Cardiovascular: Regular rate and rhythm. No murmurs, gallops, or rubs. No peripheral edema.  Respiratory: Faint wheezing in left lung fields. Right lung clear to auscultation. Good air exchange. No crackles. Non-labored " breathing.  Gastrointestinal: Bowel sounds present. Abdomen soft, non-tender. No palpable hepatosplenomegaly or masses.   Neurologic: Cranial nerves II through XII are grossly intact. Able to hold conversation. Thought process clear. Response time slightly slowed. Slowed gait, using walker.  Skin: No rashes, petechiae, or bruising noted on exposed skin.    Laboratory Data:  Results for LIO BHATIA (MRN [id number]) as of 1/18/2018 14:54   1/18/2018 12:08   Sodium 131 (L)   Potassium 4.4   Chloride 99   Carbon Dioxide 24   Urea Nitrogen 19   Creatinine 2.08 (H)   GFR Estimate 34 (L)   GFR Estimate If Black 41 (L)   Calcium 10.5 (H)   Anion Gap 8   Magnesium 2.3   Phosphorus 5.0 (H)   Albumin 2.2 (L)   Protein Total 14.1 (H)   Bilirubin Total 0.4   Alkaline Phosphatase 199 (H)   ALT 29   AST 44   Uric Acid 6.6   Glucose 91   WBC 2.1 (L)   Hemoglobin 9.5 (L)   Hematocrit 30.2 (L)   Platelet Count 43 (LL)   RBC Count 3.12 (L)   MCV 97   MCH 30.4   MCHC 31.5   RDW 17.2 (H)   Diff Method Manual Differential   % Neutrophils 77.0   % Lymphocytes 12.0   % Monocytes 8.0   % Eosinophils 0.0   % Basophils 3.0   Nucleated RBCs 2 (H)   Absolute Neutrophil 1.6   Absolute Lymphocytes 0.3 (L)   Absolute Monocytes 0.2   Absolute Eosinophils 0.0   Absolute Basophils 0.1   Absolute Nucleated RBC 0.0   Anisocytosis Slight   Poikilocytosis Slight   Rouleaux Increased       Assessment and Plan:  1. Onc  High risk MM s/p progression on multiple lines of treatment. He has previously followed with an outside oncologist. Dr. Torre spoke with Perla Orellana ([phone]) from [facility]. They determined the following plan for the patient:  -Return to [facility] for treatment moving forward. Perla is scheduling an appointment for the patient to be seen on Monday 1/22  -Daratumumab 16mg/kg weekly for 6 weeks starting Monday 1/22 at home clinic  -Pomalyst 2mg daily (will wait to start this until the appointment  Monday)  -Dexamethasone 40mg once weekly  -Dr. Torre gave them his contact information to continue to collaborate moving forward  -Will draw light chains, serum electrophoresis, and IgA, IgG, and IgM today and will fax this information, along with my note and the hospital discharge summary  -I also scheduled patient to see his PCP on Tuesday 1/23 (Dr. Hipolito Limon [facility]) at 2:20pm to follow-up on his other health concerns below.  -Patient encouraged to go to [facility] in the future should he need more acute care    2. Heme  Pancytopenia 2/2 MM and treatment  -Hgb today improved to 9.5 and plt stable at 43  -Will need lab recheck on Monday when he returns to outside oncologist    3. Renal   CHRISTIANE 2/2 ATN vs worsening MM vs infection.  -Creat today slightly worse at 2.08, though much improved compared to initial presentation  -Hyponatremia today, likely secondary to dehydration, and phosphorus is back up after fluctuating in the hospital. Protein remains elevated, likely from MM.  -Hypercalcemia inpatient, given pamidronate 60mg on 1/16. Today calcium is again elevated to 10.5 (higher corrected given low albumin). Discussed with Dr. Torre. The pamidronate will take a few days to take full effect and we cannot give Zometa with CHRISTIANE.   -Will give 1L IVF today in clinic for hypercalcemia (along with hyponatremia) and may need repeat pamidronate on Monday  -Appears euvolemic on exam. Will not give Lasix.  -Would recommend scheduling nephrology follow-up closer to home  -Continue to avoid nephrotoxic medications. Encouraged him to stay well hydrated over the weekend    4. Cards  -Prolonged QTc noted in prior hospital stay but this resolved, most recent check QTc 455. Monitor moving forward, especially since he is on Celexa  -Hx Vtach s/p ICD placement. Continue Diltiazem 60mg BID  -HTN in patient. On metoprolol IR 25mg BID and amlodipine 5mg daily. BP today 120/72.      5. ID  Fevers  inpatient without neutropenia. RSV positive and CT positive for persistent sinusitis.  -No infectious signs/symptoms today. He does have faint expiratory wheezing on exam but given no symptoms, will not do CXR today. Recheck at PCP to ensure resolution.  -Blood cultures NGTD  -S/p sucorse-free IVIG on 1/8 for hypogammaglobulinemia. Recheck immunoglobulins today  -On Vatrex for VZV ppx. Avoid ACV as thought to be contributing to AMS  -ANC today stable at 1.6  -Continue PPI daily for hx candida esophagitis/GERD    6. Endocrine  -Poor oral intake in patient and episodes of hypoglycemia here and at OSH with prior hospitalizations. He did feel slightly dizzy/diaphoretic this AM. Discussed drinking juice/eating a snack if he feels this way and to consider seeing an endocrinologist once he is home.   -Glucose today WNL at 91.    7. Psych  AMS in patient of unknown etiology, possibly prolonged hospitalizations vs CHRISTIANE vs acyclovir vs oxycodone  -Holding oxycodone and gabapentin  -Mental status today is clear with mildly slowed response time.  -Wife with him and will remain with him moving forward until he fully recovers  -On Celexa 40mg daily.    8. MSK  Pain and neuropathy minimal during hospital stay. Does have 7/10 back pain today  -Continue Tylenol, lidocaine patches, heat/ice, and positional changes to help with pain. Can use Tramadol if needed but should be last resort and given at bedtime  -Consider PT/OT rehab once other issues are improved    9. Follow-Up  -1L IVF today in our infusion center for elevated Ca and low Na  -Oncology visit at home oncologist Monday 1/22  -PCP visit with home PCP Tuesday 1/23    *I will fax this note along with most recent lab results and hospital discharge summary to both Perla and Dr. Limon*    Greater than 40 min was spent with the patient with >50% of the time spent in counseling and coordinating care.     Russel Bliss PA-C  [facility]  [address] [place] [address]  [place] [address]  [phone]

## 2018-01-18 NOTE — LETTER
1/18/2018      RE: Jeffrey Real  [address] [facility] [address]       Oncology/Hematology Visit Note  Jan 18, 2018    Reason for Visit: Follow up of multiple myeloma, hospital follow-up    History of Present Illness: Jeffrey Real is a 52 year old male with refractory IgA multiple myeloma s/p autologous transplant 08/2014 most recently on daratumumab, pomalyst, and decardron since November 2017. He was admitted 1/2/18-1/17/18 for CHRISTIANE, delirium, hypercalcemia, and RSV. He returns today for hospital follow-up.     Interval History:  Mr. Real returns to clinic today with his wife. They overall feel things have gone well since returning home. He did feel slightly dizzy and diaphoretic this AM but this resolved. He does not know if he blood sugar was low as he did not have strips to test it.    He states he has been able to walk around with his walker and while he still feels weak, he has not declined since discharge yesterday. He is trying his best to eat and drink well but still has a poor appetite. He denies nausea, vomiting, or abdominal pain. He has had return of his prior diarrhea (2-3 episodes today) and is wondering if he can take imodium. He denies urinary concerns.    He states his back is quite uncomfortable today with 7/10 pain that is typical for him. He is just doing Tylenol during the day but did try Tramadol last evening. No acute mental status changes after the Tramadol and his wife knows to be cautious with the pain medications.     He denies fevers, chills, headaches, chest pain, shortness of breath, cough, peripheral edema, rashes, or bleeding issues.     Current Outpatient Prescriptions   Medication Sig Dispense Refill     traMADol (ULTRAM) 50 MG tablet Take 1 tablet (50 mg) by mouth every 6 hours as needed for moderate pain 20 tablet 0     Lidocaine (LIDOCARE) 4 % Patch Place 2 patches onto the skin every 24 hours 30 patch 0     melatonin 3 MG tablet Take 1 tablet (3 mg) by mouth At  "Bedtime 10 tablet 0     acetaminophen (TYLENOL) 325 MG tablet Take 2 tablets (650 mg) by mouth every 4 hours as needed for mild pain 100 tablet      citalopram (CELEXA) 40 MG tablet Take 1 tablet (40 mg) by mouth daily 60 tablet 0     valACYclovir (VALTREX) 1000 mg tablet Take 1 tablet (1,000 mg) by mouth daily 14 tablet 1     metoprolol tartrate (LOPRESSOR) 25 MG tablet Take 1 tablet (25 mg) by mouth 2 times daily 20 tablet 0     amLODIPine (NORVASC) 5 MG tablet Take 1 tablet (5 mg) by mouth daily 30 tablet 0     Misc. Devices (QUAD CANE) MISC Wide Base Quad Cane for home use. For 6 weeks.       diltiazem (CARDIZEM) 30 MG tablet Take 60 mg by mouth       ondansetron (ZOFRAN-ODT) 4 MG ODT tab Place 4 mg under the tongue       OMEPRAZOLE PO Take by mouth every morning       LORazepam (ATIVAN PO) Take by mouth At Bedtime         Physical Examination:  /72 (BP Location: Right arm, Patient Position: Sitting, Cuff Size: Adult Regular)  Pulse 86  Temp 98  F (36.7  C) (Oral)  Resp 16  Ht 1.778 m (5' 10\")  Wt 75.5 kg (166 lb 6.4 oz)  SpO2 99%  BMI 23.88 kg/m2  Wt Readings from Last 10 Encounters:   01/17/18 72 kg (158 lb 12.8 oz)   11/27/17 83.8 kg (184 lb 11.2 oz)   12/03/15 97.3 kg (214 lb 8.1 oz)     Constitutional: Chronically ill-appearing male in no acute distress.  Eyes: EOMI, PERRL. No scleral icterus. Small lesion on left sclera.  ENT: Oral mucosa is moist without lesions or thrush.   Lymphatic: Neck is supple without cervical or supraclavicular lymphadenopathy.  Cardiovascular: Regular rate and rhythm. No murmurs, gallops, or rubs. No peripheral edema.  Respiratory: Faint wheezing in left lung fields. Right lung clear to auscultation. Good air exchange. No crackles. Non-labored breathing.  Gastrointestinal: Bowel sounds present. Abdomen soft, non-tender. No palpable hepatosplenomegaly or masses.   Neurologic: Cranial nerves II through XII are grossly intact. Able to hold conversation. Thought process " clear. Response time slightly slowed. Slowed gait, using walker.  Skin: No rashes, petechiae, or bruising noted on exposed skin.    Laboratory Data:  Results for LIO BHATIA (MRN [id number]) as of 1/18/2018 14:54   1/18/2018 12:08   Sodium 131 (L)   Potassium 4.4   Chloride 99   Carbon Dioxide 24   Urea Nitrogen 19   Creatinine 2.08 (H)   GFR Estimate 34 (L)   GFR Estimate If Black 41 (L)   Calcium 10.5 (H)   Anion Gap 8   Magnesium 2.3   Phosphorus 5.0 (H)   Albumin 2.2 (L)   Protein Total 14.1 (H)   Bilirubin Total 0.4   Alkaline Phosphatase 199 (H)   ALT 29   AST 44   Uric Acid 6.6   Glucose 91   WBC 2.1 (L)   Hemoglobin 9.5 (L)   Hematocrit 30.2 (L)   Platelet Count 43 (LL)   RBC Count 3.12 (L)   MCV 97   MCH 30.4   MCHC 31.5   RDW 17.2 (H)   Diff Method Manual Differential   % Neutrophils 77.0   % Lymphocytes 12.0   % Monocytes 8.0   % Eosinophils 0.0   % Basophils 3.0   Nucleated RBCs 2 (H)   Absolute Neutrophil 1.6   Absolute Lymphocytes 0.3 (L)   Absolute Monocytes 0.2   Absolute Eosinophils 0.0   Absolute Basophils 0.1   Absolute Nucleated RBC 0.0   Anisocytosis Slight   Poikilocytosis Slight   Rouleaux Increased       Assessment and Plan:  1. Onc  High risk MM s/p progression on multiple lines of treatment. He has previously followed with an outside oncologist. Dr. Torre spoke with Perla Orellana ([phone]) from [facility]. They determined the following plan for the patient:  -Return to [facility] for treatment moving forward. Perla is scheduling an appointment for the patient to be seen on Monday 1/22  -Daratumumab 16mg/kg weekly for 6 weeks starting Monday 1/22 at home clinic  -Pomalyst 2mg daily (will wait to start this until the appointment Monday)  -Dexamethasone 40mg once weekly  -Dr. Torre gave them his contact information to continue to collaborate moving forward  -Will draw light chains, serum electrophoresis, and IgA, IgG, and IgM today and will fax this  information, along with my note and the hospital discharge summary  -I also scheduled patient to see his PCP on Tuesday 1/23 (Dr. Hipolito Limon [facility]) at 2:20pm to follow-up on his other health concerns below.  -Patient encouraged to go to [facility] in the future should he need more acute care    2. Heme  Pancytopenia 2/2 MM and treatment  -Hgb today improved to 9.5 and plt stable at 43  -Will need lab recheck on Monday when he returns to outside oncologist    3. Renal   CHRISTIANE 2/2 ATN vs worsening MM vs infection.  -Creat today slightly worse at 2.08, though much improved compared to initial presentation  -Hyponatremia today, likely secondary to dehydration, and phosphorus is back up after fluctuating in the hospital. Protein remains elevated, likely from MM.  -Hypercalcemia inpatient, given pamidronate 60mg on 1/16. Today calcium is again elevated to 10.5 (higher corrected given low albumin). Discussed with Dr. Torre. The pamidronate will take a few days to take full effect and we cannot give Zometa with CHRISTIANE.   -Will give 1L IVF today in clinic for hypercalcemia (along with hyponatremia) and may need repeat pamidronate on Monday  -Appears euvolemic on exam. Will not give Lasix.  -Would recommend scheduling nephrology follow-up closer to home  -Continue to avoid nephrotoxic medications. Encouraged him to stay well hydrated over the weekend    4. Cards  -Prolonged QTc noted in prior hospital stay but this resolved, most recent check QTc 455. Monitor moving forward, especially since he is on Celexa  -Hx Vtach s/p ICD placement. Continue Diltiazem 60mg BID  -HTN in patient. On metoprolol IR 25mg BID and amlodipine 5mg daily. BP today 120/72.      5. ID  Fevers inpatient without neutropenia. RSV positive and CT positive for persistent sinusitis.  -No infectious signs/symptoms today. He does have faint expiratory wheezing on exam but given no symptoms, will not do CXR today. Recheck at  PCP to ensure resolution.  -Blood cultures NGTD  -S/p sucorse-free IVIG on 1/8 for hypogammaglobulinemia. Recheck immunoglobulins today  -On Vatrex for VZV ppx. Avoid ACV as thought to be contributing to AMS  -ANC today stable at 1.6  -Continue PPI daily for hx candida esophagitis/GERD    6. Endocrine  -Poor oral intake in patient and episodes of hypoglycemia here and at OSH with prior hospitalizations. He did feel slightly dizzy/diaphoretic this AM. Discussed drinking juice/eating a snack if he feels this way and to consider seeing an endocrinologist once he is home.   -Glucose today WNL at 91.    7. Psych  AMS in patient of unknown etiology, possibly prolonged hospitalizations vs CHRISTIANE vs acyclovir vs oxycodone  -Holding oxycodone and gabapentin  -Mental status today is clear with mildly slowed response time.  -Wife with him and will remain with him moving forward until he fully recovers  -On Celexa 40mg daily.    8. MSK  Pain and neuropathy minimal during hospital stay. Does have 7/10 back pain today  -Continue Tylenol, lidocaine patches, heat/ice, and positional changes to help with pain. Can use Tramadol if needed but should be last resort and given at bedtime  -Consider PT/OT rehab once other issues are improved    9. Follow-Up  -1L IVF today in our infusion center for elevated Ca and low Na  -Oncology visit at home oncologist Monday 1/22  -PCP visit with home PCP Tuesday 1/23    *I will fax this note along with most recent lab results and hospital discharge summary to both Perla and Dr. Limon*    Greater than 40 min was spent with the patient with >50% of the time spent in counseling and coordinating care.     Russel Bliss PA-C  [facility]  [address], [place] [address] [phone]      HERMILO Burnham

## 2018-01-18 NOTE — PROGRESS NOTES
Oncology/Hematology Visit Note  Jan 18, 2018    Reason for Visit: Follow up of multiple myeloma, hospital follow-up    History of Present Illness: Jeffrey Real is a 52 year old male with refractory IgA multiple myeloma s/p autologous transplant 08/2014 most recently on daratumumab, pomalyst, and decardron since November 2017. He was admitted 1/2/18-1/17/18 for CHRISTIANE, delirium, hypercalcemia, and RSV. He returns today for hospital follow-up.     Interval History:  Mr. Real returns to clinic today with his wife. They overall feel things have gone well since returning home. He did feel slightly dizzy and diaphoretic this AM but this resolved. He does not know if he blood sugar was low as he did not have strips to test it.    He states he has been able to walk around with his walker and while he still feels weak, he has not declined since discharge yesterday. He is trying his best to eat and drink well but still has a poor appetite. He denies nausea, vomiting, or abdominal pain. He has had return of his prior diarrhea (2-3 episodes today) and is wondering if he can take imodium. He denies urinary concerns.    He states his back is quite uncomfortable today with 7/10 pain that is typical for him. He is just doing Tylenol during the day but did try Tramadol last evening. No acute mental status changes after the Tramadol and his wife knows to be cautious with the pain medications.     He denies fevers, chills, headaches, chest pain, shortness of breath, cough, peripheral edema, rashes, or bleeding issues.     Current Outpatient Prescriptions   Medication Sig Dispense Refill     traMADol (ULTRAM) 50 MG tablet Take 1 tablet (50 mg) by mouth every 6 hours as needed for moderate pain 20 tablet 0     Lidocaine (LIDOCARE) 4 % Patch Place 2 patches onto the skin every 24 hours 30 patch 0     melatonin 3 MG tablet Take 1 tablet (3 mg) by mouth At Bedtime 10 tablet 0     acetaminophen (TYLENOL) 325 MG tablet Take 2 tablets (650 mg)  "by mouth every 4 hours as needed for mild pain 100 tablet      citalopram (CELEXA) 40 MG tablet Take 1 tablet (40 mg) by mouth daily 60 tablet 0     valACYclovir (VALTREX) 1000 mg tablet Take 1 tablet (1,000 mg) by mouth daily 14 tablet 1     metoprolol tartrate (LOPRESSOR) 25 MG tablet Take 1 tablet (25 mg) by mouth 2 times daily 20 tablet 0     amLODIPine (NORVASC) 5 MG tablet Take 1 tablet (5 mg) by mouth daily 30 tablet 0     Misc. Devices (QUAD CANE) MISC Wide Base Quad Cane for home use. For 6 weeks.       diltiazem (CARDIZEM) 30 MG tablet Take 60 mg by mouth       ondansetron (ZOFRAN-ODT) 4 MG ODT tab Place 4 mg under the tongue       OMEPRAZOLE PO Take by mouth every morning       LORazepam (ATIVAN PO) Take by mouth At Bedtime         Physical Examination:  /72 (BP Location: Right arm, Patient Position: Sitting, Cuff Size: Adult Regular)  Pulse 86  Temp 98  F (36.7  C) (Oral)  Resp 16  Ht 1.778 m (5' 10\")  Wt 75.5 kg (166 lb 6.4 oz)  SpO2 99%  BMI 23.88 kg/m2  Wt Readings from Last 10 Encounters:   01/17/18 72 kg (158 lb 12.8 oz)   11/27/17 83.8 kg (184 lb 11.2 oz)   12/03/15 97.3 kg (214 lb 8.1 oz)     Constitutional: Chronically ill-appearing male in no acute distress.  Eyes: EOMI, PERRL. No scleral icterus. Small lesion on left sclera.  ENT: Oral mucosa is moist without lesions or thrush.   Lymphatic: Neck is supple without cervical or supraclavicular lymphadenopathy.  Cardiovascular: Regular rate and rhythm. No murmurs, gallops, or rubs. No peripheral edema.  Respiratory: Faint wheezing in left lung fields. Right lung clear to auscultation. Good air exchange. No crackles. Non-labored breathing.  Gastrointestinal: Bowel sounds present. Abdomen soft, non-tender. No palpable hepatosplenomegaly or masses.   Neurologic: Cranial nerves II through XII are grossly intact. Able to hold conversation. Thought process clear. Response time slightly slowed. Slowed gait, using walker.  Skin: No rashes, " petechiae, or bruising noted on exposed skin.    Laboratory Data:  Results for LIO BHATIA (MRN [id number]) as of 1/18/2018 14:54   1/18/2018 12:08   Sodium 131 (L)   Potassium 4.4   Chloride 99   Carbon Dioxide 24   Urea Nitrogen 19   Creatinine 2.08 (H)   GFR Estimate 34 (L)   GFR Estimate If Black 41 (L)   Calcium 10.5 (H)   Anion Gap 8   Magnesium 2.3   Phosphorus 5.0 (H)   Albumin 2.2 (L)   Protein Total 14.1 (H)   Bilirubin Total 0.4   Alkaline Phosphatase 199 (H)   ALT 29   AST 44   Uric Acid 6.6   Glucose 91   WBC 2.1 (L)   Hemoglobin 9.5 (L)   Hematocrit 30.2 (L)   Platelet Count 43 (LL)   RBC Count 3.12 (L)   MCV 97   MCH 30.4   MCHC 31.5   RDW 17.2 (H)   Diff Method Manual Differential   % Neutrophils 77.0   % Lymphocytes 12.0   % Monocytes 8.0   % Eosinophils 0.0   % Basophils 3.0   Nucleated RBCs 2 (H)   Absolute Neutrophil 1.6   Absolute Lymphocytes 0.3 (L)   Absolute Monocytes 0.2   Absolute Eosinophils 0.0   Absolute Basophils 0.1   Absolute Nucleated RBC 0.0   Anisocytosis Slight   Poikilocytosis Slight   Rouleaux Increased       Assessment and Plan:  1. Onc  High risk MM s/p progression on multiple lines of treatment. He has previously followed with an outside oncologist. Dr. Torre spoke with Perla Orellana ([phone]) from [facility]. They determined the following plan for the patient:  -Return to [facility] for treatment moving forward. Perla is scheduling an appointment for the patient to be seen on Monday 1/22  -Daratumumab 16mg/kg weekly for 6 weeks starting Monday 1/22 at home clinic  -Pomalyst 2mg daily (will wait to start this until the appointment Monday)  -Dexamethasone 40mg once weekly  -Dr. Torre gave them his contact information to continue to collaborate moving forward  -Will draw light chains, serum electrophoresis, and IgA, IgG, and IgM today and will fax this information, along with my note and the hospital discharge summary  -I also scheduled  patient to see his PCP on Tuesday 1/23 (Dr. Hipolito Limon [facility]) at 2:20pm to follow-up on his other health concerns below.  -Patient encouraged to go to [facility] in the future should he need more acute care    2. Heme  Pancytopenia 2/2 MM and treatment  -Hgb today improved to 9.5 and plt stable at 43  -Will need lab recheck on Monday when he returns to outside oncologist    3. Renal   CHRISTIANE 2/2 ATN vs worsening MM vs infection.  -Creat today slightly worse at 2.08, though much improved compared to initial presentation  -Hyponatremia today, likely secondary to dehydration, and phosphorus is back up after fluctuating in the hospital. Protein remains elevated, likely from MM.  -Hypercalcemia inpatient, given pamidronate 60mg on 1/16. Today calcium is again elevated to 10.5 (higher corrected given low albumin). Discussed with Dr. Torre. The pamidronate will take a few days to take full effect and we cannot give Zometa with CHRISTIANE.   -Will give 1L IVF today in clinic for hypercalcemia (along with hyponatremia) and may need repeat pamidronate on Monday  -Appears euvolemic on exam. Will not give Lasix.  -Would recommend scheduling nephrology follow-up closer to home  -Continue to avoid nephrotoxic medications. Encouraged him to stay well hydrated over the weekend    4. Cards  -Prolonged QTc noted in prior hospital stay but this resolved, most recent check QTc 455. Monitor moving forward, especially since he is on Celexa  -Hx Vtach s/p ICD placement. Continue Diltiazem 60mg BID  -HTN in patient. On metoprolol IR 25mg BID and amlodipine 5mg daily. BP today 120/72.      5. ID  Fevers inpatient without neutropenia. RSV positive and CT positive for persistent sinusitis.  -No infectious signs/symptoms today. He does have faint expiratory wheezing on exam but given no symptoms, will not do CXR today. Recheck at PCP to ensure resolution.  -Blood cultures NGTD  -S/p sucorse-free IVIG on 1/8 for  hypogammaglobulinemia. Recheck immunoglobulins today  -On Vatrex for VZV ppx. Avoid ACV as thought to be contributing to AMS  -ANC today stable at 1.6  -Continue PPI daily for hx candida esophagitis/GERD    6. Endocrine  -Poor oral intake in patient and episodes of hypoglycemia here and at OSH with prior hospitalizations. He did feel slightly dizzy/diaphoretic this AM. Discussed drinking juice/eating a snack if he feels this way and to consider seeing an endocrinologist once he is home.   -Glucose today WNL at 91.    7. Psych  AMS in patient of unknown etiology, possibly prolonged hospitalizations vs CHRISTIANE vs acyclovir vs oxycodone  -Holding oxycodone and gabapentin  -Mental status today is clear with mildly slowed response time.  -Wife with him and will remain with him moving forward until he fully recovers  -On Celexa 40mg daily.    8. MSK  Pain and neuropathy minimal during hospital stay. Does have 7/10 back pain today  -Continue Tylenol, lidocaine patches, heat/ice, and positional changes to help with pain. Can use Tramadol if needed but should be last resort and given at bedtime  -Consider PT/OT rehab once other issues are improved    9. Follow-Up  -1L IVF today in our infusion center for elevated Ca and low Na  -Oncology visit at home oncologist Monday 1/22  -PCP visit with home PCP Tuesday 1/23    *I will fax this note along with most recent lab results and hospital discharge summary to both Perla and Dr. Limon*    Greater than 40 min was spent with the patient with >50% of the time spent in counseling and coordinating care.     Russel Bliss PA-C  [facility]  [address] [place] [address]  [phone]

## 2018-01-18 NOTE — PROGRESS NOTES
Discharge    D: Orders for discharge and outpatient medications written.    I: Home medications and return to clinic schedule reviewed with patient. Discharge instructions and parameters for calling Health Care Provider reviewed. Pt's wife aware of clinic triage number in case of questions. New medications reviewed and pt's wife aware of medications to stop, including oxycodone. Port de-accessed. Patient left at 1600 accompanied by wife.     A: Patient/family verbalized understanding and was ready for discharge. Pt's mentation remains altered with some confusion and off statements. Not able to report year accurately. Blood sugars slowly improved throughout the day; 74, 73, 87, 100, 127. Still poor appetite but intake did increase throughout the day. Pt's wife educated on signs of hypoglycemia and interventions if pt becomes lethargic. Barrier cream applied to buttocks rash. Pt using walker to get around, has multiple walkers at home.     P: Patient instructed to  medications in Pharmacy. TCU discharge was discussed and offered but pt's wife comfortable taking pt home d/t close follow up in clinic tomorrow. Follow up as scheduled for tomorrow, 1/18 for labs and appointment with Russel AKHTAR. Planning for labs, appointment, and infusion clinic to receive daratumumab on 1/22. Appointment with Dr. Torre on 1/30. [place] care coordinator made referrals for home care and outpatient OT/PT.

## 2018-01-18 NOTE — PLAN OF CARE
Problem: Patient Care Overview  Goal: Plan of Care/Patient Progress Review  Occupational Therapy Discharge Summary    Reason for therapy discharge:    Discharged to home with outpatient therapy.    Progress towards therapy goal(s). See goals on Care Plan in [facility] electronic health record for goal details.  Goals not met.  Barriers to achieving goals:   discharge from facility.    Therapy recommendation(s):    Continued therapy is recommended.  Rationale/Recommendations:  outpatient OT/PT to facilitate strength, endurance, and cognitive compensation. Spouse to provide 24/4 supervision/assist for safety..

## 2018-01-18 NOTE — NURSING NOTE
"Oncology Rooming Note    January 18, 2018 12:22 PM   Jeffrey Real is a 52 year old male who presents for:    Chief Complaint   Patient presents with     Blood Draw     labs drawn by venipuncture by rn.  vital signs taken.     Oncology Clinic Visit     Return visit related to Multiple Myeloma     Initial Vitals: /72 (BP Location: Right arm, Patient Position: Sitting, Cuff Size: Adult Regular)  Pulse 86  Temp 98  F (36.7  C) (Oral)  Resp 16  Ht 1.778 m (5' 10\")  Wt 75.5 kg (166 lb 6.4 oz)  SpO2 99%  BMI 23.88 kg/m2 Estimated body mass index is 23.88 kg/(m^2) as calculated from the following:    Height as of this encounter: 1.778 m (5' 10\").    Weight as of this encounter: 75.5 kg (166 lb 6.4 oz). Body surface area is 1.93 meters squared.  Severe Pain (7) Comment: Data Unavailable   No LMP for male patient.  Allergies reviewed: Yes  Medications reviewed: Yes    Medications: Medication refills not needed today.  Pharmacy name entered into EPIC: Data Unavailable    Clinical concerns:  No new concerns. Provider was notified.    10 minutes for nursing intake (face to face time)     Sivan Barreto LPN            "

## 2018-01-18 NOTE — MR AVS SNAPSHOT
After Visit Summary   1/18/2018    Jeffrey Real    MRN: [id number]           Patient Information     Date Of Birth          1965        Visit Information        Provider Department      1/18/2018 1:30 PM UC 9 ATC; UC BMT INFUSION [facility] Blood and Marrow Transplant        Today's Diagnoses     Multiple myeloma in relapse (H)    -  1          [facility] ([facility])  [address] [place] [id number]  Phone: [phone]  Clinic Hours:   Monday-Thursday:7am to 7pm   Friday: 7am to 5pm   Weekends and holidays:    8am to noon (in general)  If your fever is 100.5  or greater,   call the clinic.  After hours call the   hospital at [phone] or   [phone]. Ask for the BMT   fellow on-call            Follow-ups after your visit        Your next 10 appointments already scheduled     Jan 22, 2018  9:15 AM CST   Mason[facility] Lab Draw with UC MASONIC LAB DRAW   [facility] Masonic Lab Draw ([facility])    [address] [place] [address]  [address] [place] [id number]   [phone]            Jan 22, 2018 10:00 AM CST   (Arrive by 9:45 AM)   Return Visit with HERMILO Burnham   [facility] ([facility])    [address] [place] [id number]   [phone]            Jan 22, 2018 11:30 AM CST   Infusion 360 with UC ONCOLOGY INFUSION, UC 17 ATC   [facility] ([facility])    [address] [place] [address]  [address] [place] [id number]   [phone]            Jan 30, 2018  2:00 PM CST   Masonic Lab Draw with UC MASONIC LAB DRAW   [facility] Masonic Lab Draw ([facility])    [address] [place] [id number]   [phone]            Jan 30, 2018  2:30 PM CST   RETURN ONC with James Torre MD   [facility] Blood and Marrow Transplant ([facility])    [address] [place] [address]  [address]  [facility] [id number] [phone]              Future tests that were ordered for you today     Open Future Orders        Priority Expected Expires Ordered    EKG 12-lead complete w/read - Clinics Routine 1/18/2018 1/19/2018 1/17/2018            Who to contact     If you have questions or need follow up information about today's clinic visit or your schedule please contact [facility] BLOOD AND MARROW TRANSPLANT directly at [phone].  Normal or non-critical lab and imaging results will be communicated to you by Dash[employer], letter or phone within 4 business days after the clinic has received the results. If you do not hear from us within 7 days, please contact the clinic through [employer] or phone. If you have a critical or abnormal lab result, we will notify you by phone as soon as possible.  Submit refill requests through [employer] or call your pharmacy and they will forward the refill request to us. Please allow 3 business days for your refill to be completed.          Additional Information About Your Visit        [employer] Information     [employer] gives you secure access to your electronic health record. If you see a primary care provider, you can also send messages to your care team and make appointments. If you have questions, please call your primary care clinic.  If you do not have a primary care provider, please call [phone] and they will assist you.        Care EveryWhere ID     This is your Care EveryWhere ID. This could be used by other organizations to access your [facility] medical records  [id number]         Blood Pressure from Last 3 Encounters:   01/18/18 120/72   01/17/18 117/77   11/27/17 120/79    Weight from Last 3 Encounters:   01/18/18 75.5 kg (166 lb 6.4 oz)   01/17/18 72 kg (158 lb 12.8 oz)   11/27/17 83.8 kg (184 lb 11.2 oz)              Today, you had the following     No orders found for display       Recent Review Flowsheet Data     BMT Recent Results Latest Ref Rng & Units  1/17/2018 1/17/2018 1/17/2018 1/17/2018 1/17/2018 1/17/2018 1/18/2018    WBC 4.0 - 11.0 10e9/L 2.0(L) - - - - - 2.1(L)    Hemoglobin 13.3 - 17.7 g/dL 8.5(L) - - - - - 9.5(L)    Platelet Count 150 - 450 10e9/L 38(LL) - - - - - 43(LL)    Neutrophils (Absolute) 1.6 - 8.3 10e9/L 1.6 - - - - - 1.6    INR 0.86 - 1.14 - - - - - - -    Sodium 133 - 144 mmol/L 137 - - - - - 131(L)    Potassium 3.4 - 5.3 mmol/L 4.3 - - - - - 4.4    Chloride 94 - 109 mmol/L 105 - - - - - 99    Glucose 70 - 99 mg/dL 123(H) 74 73 87 100(H) 127(H) 91    Urea Nitrogen 7 - 30 mg/dL 18 - - - - - 19    Creatinine 0.66 - 1.25 mg/dL 2.00(H) - - - - - 2.08(H)    Calcium (Total) 8.5 - 10.1 mg/dL 10.0 - - - - - 10.5(H)    Protein (Total) 6.8 - 8.8 g/dL - - - - - - 14.1(H)    Albumin 3.4 - 5.0 g/dL - - - - - - 2.2(L)    Bilirubin (Direct) 0.0 - 0.2 mg/dL - - - - - - -    Alkaline Phosphatase 40 - 150 U/L - - - - - - 199(H)    AST 0 - 45 U/L - - - - - - 44    ALT 0 - 70 U/L - - - - - - 29    MCV 78 - 100 fl 95 - - - - - 97               Primary Care Provider Office Phone # Fax #    Hipolito RollinsDO [phone]       [address] [address]        Equal Access to Services     NALINI MCKEON AH: Phillip Mcgee, waaxda flaquita benton waxay idiin hayaan adeeg khmagalie laMarcelokuldeep ah. So [facility] [phone].    ATENCIÓN: Si habla alex, tiene a guardado disposición servicios gratuitos de asistencia lingüística. Llame al [phone].    We comply with applicable federal civil rights laws and [place] laws. We do not discriminate on the basis of race, color, national origin, age, disability, sex, sexual orientation, or gender identity.            Thank you!     Thank you for choosing [facility] BLOOD AND MARROW TRANSPLANT  for your care. Our goal is always to provide you with excellent care. Hearing back from our patients is one way we can continue to improve our services. Please take a few minutes to  complete the written survey that you may receive in the mail after your visit with us. Thank you!             Your Updated Medication List - Protect others around you: Learn how to safely use, store and throw away your medicines at www.disposemymeds.org.          This list is accurate as of: 1/18/18  3:05 PM.  Always use your most recent med list.                   Brand Name Dispense Instructions for use Diagnosis    acetaminophen 325 MG tablet    TYLENOL    100 tablet    Take 2 tablets (650 mg) by mouth every 4 hours as needed for mild pain    Multiple myeloma in relapse (H)       amLODIPine 5 MG tablet    NORVASC    30 tablet    Take 1 tablet (5 mg) by mouth daily    Multiple myeloma in relapse (H)       ATIVAN PO      Take by mouth At Bedtime        citalopram 40 MG tablet    celeXA    60 tablet    Take 1 tablet (40 mg) by mouth daily    Multiple myeloma in relapse (H)       daratumumab 400 MG/20ML injection    DARZALEX     Inject 1,312 mg into the vein        dexamethasone 10 MG/ML injection    DECADRON     Inject 20 mg into the vein as needed        diltiazem 30 MG tablet    CARDIZEM     Take 60 mg by mouth        diphenhydrAMINE 50 MG/ML injection    BENADRYL     Inject 50 mg into the vein        guaiFENesin-codeine 100-10 MG/5ML Syrp syrup    guaiFENesin AC     Take by mouth as needed        Lidocaine 4 % Patch    LIDOCARE    30 patch    Place 2 patches onto the skin every 24 hours    Multiple myeloma in relapse (H)       melatonin 3 MG tablet     10 tablet    Take 1 tablet (3 mg) by mouth At Bedtime    Multiple myeloma in relapse (H)       metoprolol succinate 25 MG 24 hr tablet    TOPROL-XL     Take by mouth daily        metoprolol tartrate 25 MG tablet    LOPRESSOR    20 tablet    Take 1 tablet (25 mg) by mouth 2 times daily    Multiple myeloma in relapse (H)       OMEPRAZOLE PO      Take by mouth every morning        ondansetron 4 MG ODT tab    ZOFRAN-ODT     Place 4 mg under the tongue         prochlorperazine 10 MG tablet    COMPAZINE     Take 10 mg by mouth as needed        Quad Cane Misc      Wide Base Quad Cane for home use. For 6 weeks.        traMADol 50 MG tablet    ULTRAM    20 tablet    Take 1 tablet (50 mg) by mouth every 6 hours as needed for moderate pain    Multiple myeloma in relapse (H)       valACYclovir 1000 mg tablet    VALTREX    14 tablet    Take 1 tablet (1,000 mg) by mouth daily    Multiple myeloma in relapse (H)       zoledronic acid 4 MG/5ML injection    ZOMETA     Inject 4 mg into the vein

## 2018-01-18 NOTE — NURSING NOTE
Chief Complaint   Patient presents with     Blood Draw     labs drawn by venipuncture by rn.  vital signs taken.     Labs drawn by venipuncture by rn.  Vital signs taken.  Jaylene Mccain RN

## 2018-01-19 ENCOUNTER — TRANSFERRED RECORDS (OUTPATIENT)
Dept: HEALTH INFORMATION MANAGEMENT | Facility: CLINIC | Age: 53
End: 2018-01-19

## 2018-01-19 LAB
IGA SERPL-MCNC: 7200 MG/DL (ref 70–380)
IGG SERPL-MCNC: 567 MG/DL (ref 695–1620)
IGM SERPL-MCNC: 6 MG/DL (ref 60–265)

## 2018-01-21 LAB
KAPPA LC UR-MCNC: 905 MG/DL (ref 0.33–1.94)
KAPPA LC/LAMBDA SER: 1239.73 {RATIO} (ref 0.26–1.65)
LAMBDA LC SERPL-MCNC: 0.73 MG/DL (ref 0.57–2.63)

## 2018-01-22 LAB
ALBUMIN SERPL ELPH-MCNC: 2.8 G/DL (ref 3.7–5.1)
ALPHA1 GLOB SERPL ELPH-MCNC: 0.4 G/DL (ref 0.2–0.4)
ALPHA2 GLOB SERPL ELPH-MCNC: 0.7 G/DL (ref 0.5–0.9)
B-GLOBULIN SERPL ELPH-MCNC: 0.3 G/DL (ref 0.6–1)
BACTERIA SPEC CULT: NO GROWTH
GAMMA GLOB SERPL ELPH-MCNC: 9 G/DL (ref 0.7–1.6)
M PROTEIN SERPL ELPH-MCNC: 8 G/DL
PROT PATTERN SERPL ELPH-IMP: ABNORMAL
SPECIMEN SOURCE: NORMAL

## 2018-01-31 LAB
FUNGUS SPEC CULT: ABNORMAL
FUNGUS SPEC CULT: ABNORMAL
Lab: ABNORMAL
SPECIMEN SOURCE: ABNORMAL

## 2020-03-10 ENCOUNTER — HEALTH MAINTENANCE LETTER (OUTPATIENT)
Age: 55
End: 2020-03-10

## 2020-12-27 ENCOUNTER — HEALTH MAINTENANCE LETTER (OUTPATIENT)
Age: 55
End: 2020-12-27

## 2021-04-24 ENCOUNTER — HEALTH MAINTENANCE LETTER (OUTPATIENT)
Age: 56
End: 2021-04-24

## 2021-10-04 ENCOUNTER — HEALTH MAINTENANCE LETTER (OUTPATIENT)
Age: 56
End: 2021-10-04

## 2022-05-15 ENCOUNTER — HEALTH MAINTENANCE LETTER (OUTPATIENT)
Age: 57
End: 2022-05-15

## 2022-09-11 ENCOUNTER — HEALTH MAINTENANCE LETTER (OUTPATIENT)
Age: 57
End: 2022-09-11

## 2023-06-03 ENCOUNTER — HEALTH MAINTENANCE LETTER (OUTPATIENT)
Age: 58
End: 2023-06-03